# Patient Record
Sex: FEMALE | Race: WHITE | Employment: OTHER | URBAN - METROPOLITAN AREA
[De-identification: names, ages, dates, MRNs, and addresses within clinical notes are randomized per-mention and may not be internally consistent; named-entity substitution may affect disease eponyms.]

---

## 2017-04-13 ENCOUNTER — OP HISTORICAL/CONVERTED ENCOUNTER (OUTPATIENT)
Dept: OTHER | Age: 67
End: 2017-04-13

## 2017-08-27 ENCOUNTER — ED HISTORICAL/CONVERTED ENCOUNTER (OUTPATIENT)
Dept: OTHER | Age: 67
End: 2017-08-27

## 2017-09-18 ENCOUNTER — OP HISTORICAL/CONVERTED ENCOUNTER (OUTPATIENT)
Dept: OTHER | Age: 67
End: 2017-09-18

## 2017-10-02 ENCOUNTER — OP HISTORICAL/CONVERTED ENCOUNTER (OUTPATIENT)
Dept: OTHER | Age: 67
End: 2017-10-02

## 2018-02-28 ENCOUNTER — OP HISTORICAL/CONVERTED ENCOUNTER (OUTPATIENT)
Dept: OTHER | Age: 68
End: 2018-02-28

## 2018-03-01 ENCOUNTER — OP HISTORICAL/CONVERTED ENCOUNTER (OUTPATIENT)
Dept: OTHER | Age: 68
End: 2018-03-01

## 2018-10-31 LAB — HBA1C MFR BLD HPLC: 5.5 %

## 2019-04-16 ENCOUNTER — OP HISTORICAL/CONVERTED ENCOUNTER (OUTPATIENT)
Dept: OTHER | Age: 69
End: 2019-04-16

## 2019-06-05 ENCOUNTER — OP HISTORICAL/CONVERTED ENCOUNTER (OUTPATIENT)
Dept: OTHER | Age: 69
End: 2019-06-05

## 2019-06-26 ENCOUNTER — OP HISTORICAL/CONVERTED ENCOUNTER (OUTPATIENT)
Dept: OTHER | Age: 69
End: 2019-06-26

## 2019-06-26 LAB — COLONOSCOPY, EXTERNAL: NORMAL

## 2020-01-13 LAB
CREATININE, EXTERNAL: 0.72
LDL-C, EXTERNAL: 124
MICROALBUMIN UR TEST STR-MCNC: NORMAL MG/DL

## 2020-07-16 VITALS
TEMPERATURE: 97.4 F | HEART RATE: 70 BPM | BODY MASS INDEX: 33.56 KG/M2 | SYSTOLIC BLOOD PRESSURE: 130 MMHG | HEIGHT: 63 IN | RESPIRATION RATE: 18 BRPM | DIASTOLIC BLOOD PRESSURE: 82 MMHG | OXYGEN SATURATION: 98 % | WEIGHT: 189.4 LBS

## 2020-07-16 PROBLEM — E78.5 DYSLIPIDEMIA: Status: ACTIVE | Noted: 2020-07-16

## 2020-07-16 PROBLEM — E66.9 OBESITY, UNSPECIFIED: Status: ACTIVE | Noted: 2020-07-16

## 2020-07-16 PROBLEM — M79.7 FIBROMYALGIA: Status: ACTIVE | Noted: 2020-07-16

## 2020-07-16 PROBLEM — F41.9 ANXIETY: Status: ACTIVE | Noted: 2020-07-16

## 2020-07-16 PROBLEM — E78.00 PURE HYPERCHOLESTEROLEMIA: Status: ACTIVE | Noted: 2020-07-16

## 2020-07-16 PROBLEM — R06.00 DYSPNEA: Status: ACTIVE | Noted: 2020-07-16

## 2020-07-16 PROBLEM — E03.9 ACQUIRED HYPOTHYROIDISM: Status: ACTIVE | Noted: 2020-07-16

## 2020-07-16 PROBLEM — R06.2 WHEEZING: Status: ACTIVE | Noted: 2020-07-16

## 2020-07-16 PROBLEM — G47.00 INSOMNIA: Status: ACTIVE | Noted: 2020-07-16

## 2020-07-16 PROBLEM — R94.31 NONSPECIFIC ABNORMAL ELECTROCARDIOGRAM (ECG) (EKG): Status: ACTIVE | Noted: 2020-07-16

## 2020-07-16 PROBLEM — K21.9 GASTROESOPHAGEAL REFLUX DISEASE: Status: ACTIVE | Noted: 2020-07-16

## 2020-07-16 PROBLEM — L03.213 PRESEPTAL CELLULITIS: Status: ACTIVE | Noted: 2020-07-16

## 2020-07-16 PROBLEM — F17.210 CIGARETTE SMOKER: Status: ACTIVE | Noted: 2020-07-16

## 2020-07-16 PROBLEM — R05.9 COUGH: Status: ACTIVE | Noted: 2020-07-16

## 2020-07-16 PROBLEM — F17.200 NICOTINE DEPENDENCE: Status: ACTIVE | Noted: 2020-07-16

## 2020-07-16 PROBLEM — L28.2 PRURITIC RASH: Status: ACTIVE | Noted: 2020-07-16

## 2020-07-16 PROBLEM — I10 ESSENTIAL HYPERTENSION, MALIGNANT: Status: ACTIVE | Noted: 2020-07-16

## 2020-07-16 PROBLEM — R22.1 NECK SWELLING: Status: ACTIVE | Noted: 2020-07-16

## 2020-07-16 PROBLEM — J44.9 CHRONIC OBSTRUCTIVE LUNG DISEASE (HCC): Status: ACTIVE | Noted: 2020-07-16

## 2020-07-16 RX ORDER — ALBUTEROL SULFATE 90 UG/1
AEROSOL, METERED RESPIRATORY (INHALATION)
COMMUNITY
End: 2021-03-26 | Stop reason: SDUPTHER

## 2020-07-16 RX ORDER — ALBUTEROL SULFATE 2.5 MG/.5ML
SOLUTION RESPIRATORY (INHALATION) ONCE
COMMUNITY
End: 2020-12-10 | Stop reason: SDUPTHER

## 2020-07-16 RX ORDER — TRAZODONE HYDROCHLORIDE 50 MG/1
TABLET ORAL
COMMUNITY
End: 2020-07-28

## 2020-07-16 RX ORDER — PREGABALIN 150 MG/1
CAPSULE ORAL 2 TIMES DAILY
COMMUNITY

## 2020-07-16 RX ORDER — TRIAMTERENE AND HYDROCHLOROTHIAZIDE 37.5; 25 MG/1; MG/1
CAPSULE ORAL DAILY
COMMUNITY
End: 2020-07-28

## 2020-07-16 RX ORDER — LEVOTHYROXINE SODIUM 50 UG/1
TABLET ORAL
COMMUNITY
End: 2020-07-28

## 2020-07-16 RX ORDER — HYDROXYZINE 25 MG/1
TABLET, FILM COATED ORAL
COMMUNITY
End: 2020-07-27 | Stop reason: SDUPTHER

## 2020-07-16 RX ORDER — OMEPRAZOLE 20 MG/1
20 CAPSULE, DELAYED RELEASE ORAL DAILY
COMMUNITY
End: 2020-07-28

## 2020-07-16 RX ORDER — FLUTICASONE PROPIONATE AND SALMETEROL 100; 50 UG/1; UG/1
1 POWDER RESPIRATORY (INHALATION) EVERY 12 HOURS
COMMUNITY
End: 2020-08-19

## 2020-07-23 VITALS — HEIGHT: 63 IN

## 2020-07-23 RX ORDER — VALACYCLOVIR HYDROCHLORIDE 500 MG/1
TABLET, FILM COATED ORAL 2 TIMES DAILY
COMMUNITY
End: 2020-12-10

## 2020-07-23 RX ORDER — CETIRIZINE HCL 10 MG
TABLET ORAL
COMMUNITY
End: 2021-03-26 | Stop reason: ALTCHOICE

## 2020-07-23 RX ORDER — METRONIDAZOLE 500 MG/1
TABLET ORAL 3 TIMES DAILY
COMMUNITY
End: 2020-12-10

## 2020-07-23 RX ORDER — DULOXETIN HYDROCHLORIDE 30 MG/1
30 CAPSULE, DELAYED RELEASE ORAL DAILY
COMMUNITY
End: 2021-03-26 | Stop reason: ALTCHOICE

## 2020-07-23 RX ORDER — GATIFLOXACIN 5 MG/ML
1 SOLUTION/ DROPS OPHTHALMIC 4 TIMES DAILY
COMMUNITY
End: 2021-03-26 | Stop reason: ALTCHOICE

## 2020-07-23 RX ORDER — CITALOPRAM 20 MG/1
TABLET, FILM COATED ORAL DAILY
COMMUNITY
End: 2021-03-26 | Stop reason: ALTCHOICE

## 2020-07-23 RX ORDER — ATROPINE SULFATE 10 MG/ML
1 SOLUTION/ DROPS OPHTHALMIC 3 TIMES DAILY
COMMUNITY
End: 2021-03-26 | Stop reason: ALTCHOICE

## 2020-07-23 RX ORDER — FLUTICASONE PROPIONATE 50 MCG
2 SPRAY, SUSPENSION (ML) NASAL DAILY
COMMUNITY
End: 2021-03-26 | Stop reason: ALTCHOICE

## 2020-07-23 RX ORDER — PREDNISONE 50 MG/1
50 TABLET ORAL DAILY
COMMUNITY
End: 2020-12-10

## 2020-07-23 RX ORDER — GABAPENTIN 300 MG/1
300 CAPSULE ORAL 3 TIMES DAILY
COMMUNITY
End: 2021-03-26 | Stop reason: ALTCHOICE

## 2020-07-23 RX ORDER — PREDNISOLONE ACETATE 10 MG/ML
1 SUSPENSION/ DROPS OPHTHALMIC 4 TIMES DAILY
COMMUNITY
End: 2021-03-26 | Stop reason: ALTCHOICE

## 2020-07-23 RX ORDER — CHOLESTYRAMINE LIGHT 4 G/5.7G
POWDER, FOR SUSPENSION ORAL
COMMUNITY
End: 2020-12-10

## 2020-07-23 RX ORDER — ASPIRIN 81 MG/1
TABLET ORAL DAILY
COMMUNITY
End: 2021-03-26 | Stop reason: ALTCHOICE

## 2020-07-23 RX ORDER — NEPAFENAC 3 MG/ML
SUSPENSION OPHTHALMIC
COMMUNITY
End: 2021-03-26 | Stop reason: ALTCHOICE

## 2020-07-23 RX ORDER — CLONAZEPAM 0.5 MG/1
TABLET ORAL
COMMUNITY
End: 2021-03-26 | Stop reason: ALTCHOICE

## 2020-07-23 RX ORDER — POLYETHYLENE GLYCOL 3350, SODIUM SULFATE ANHYDROUS, SODIUM BICARBONATE, SODIUM CHLORIDE, POTASSIUM CHLORIDE 236; 22.74; 6.74; 5.86; 2.97 G/4L; G/4L; G/4L; G/4L; G/4L
POWDER, FOR SOLUTION ORAL
COMMUNITY
End: 2021-03-26 | Stop reason: ALTCHOICE

## 2020-07-23 RX ORDER — ZOLPIDEM TARTRATE 5 MG/1
TABLET ORAL
COMMUNITY
End: 2021-03-26 | Stop reason: ALTCHOICE

## 2020-07-23 RX ORDER — DOXYCYCLINE 100 MG/1
100 CAPSULE ORAL 2 TIMES DAILY
COMMUNITY
End: 2020-12-10

## 2020-07-23 RX ORDER — BACITRACIN 500 [USP'U]/G
OINTMENT OPHTHALMIC EVERY 4 HOURS
COMMUNITY
End: 2021-03-26 | Stop reason: ALTCHOICE

## 2020-07-23 RX ORDER — HOMATROPINE HYDROBROMIDE OPHTHALMIC 50 MG/ML
1 SOLUTION OPHTHALMIC
COMMUNITY
End: 2021-03-26 | Stop reason: ALTCHOICE

## 2020-07-23 RX ORDER — METHYLCELLULOSE
POWDER (GRAM) ORAL
COMMUNITY
End: 2021-03-26 | Stop reason: ALTCHOICE

## 2020-07-23 RX ORDER — MONTELUKAST SODIUM 10 MG/1
10 TABLET ORAL DAILY
COMMUNITY
End: 2021-03-26 | Stop reason: ALTCHOICE

## 2020-07-23 RX ORDER — NAPROXEN 500 MG/1
500 TABLET ORAL 2 TIMES DAILY WITH MEALS
COMMUNITY
End: 2020-12-10

## 2020-07-23 RX ORDER — TRIAMCINOLONE ACETONIDE 1 MG/G
CREAM TOPICAL 2 TIMES DAILY
COMMUNITY
End: 2021-03-26 | Stop reason: ALTCHOICE

## 2020-07-23 RX ORDER — DIPHENOXYLATE HYDROCHLORIDE AND ATROPINE SULFATE 2.5; .025 MG/1; MG/1
TABLET ORAL
COMMUNITY
End: 2021-03-26 | Stop reason: ALTCHOICE

## 2020-07-23 RX ORDER — VARENICLINE TARTRATE 0.5 (11)-1
KIT ORAL
COMMUNITY
End: 2020-12-10

## 2020-07-23 RX ORDER — CIPROFLOXACIN 500 MG/1
TABLET ORAL 2 TIMES DAILY
COMMUNITY
End: 2020-12-10

## 2020-07-23 RX ORDER — ALBUTEROL SULFATE 90 UG/1
AEROSOL, METERED RESPIRATORY (INHALATION)
COMMUNITY
End: 2021-03-08

## 2020-07-27 ENCOUNTER — TELEPHONE (OUTPATIENT)
Dept: ENT CLINIC | Age: 70
End: 2020-07-27

## 2020-07-27 DIAGNOSIS — L50.9 URTICARIA: Primary | ICD-10-CM

## 2020-07-27 RX ORDER — HYDROXYZINE 25 MG/1
25 TABLET, FILM COATED ORAL
Qty: 90 TAB | Refills: 3 | Status: SHIPPED | OUTPATIENT
Start: 2020-07-27 | End: 2020-10-26 | Stop reason: SDUPTHER

## 2020-07-28 RX ORDER — TRAZODONE HYDROCHLORIDE 50 MG/1
TABLET ORAL
Qty: 90 TAB | Refills: 3 | Status: SHIPPED | OUTPATIENT
Start: 2020-07-28 | End: 2021-03-26 | Stop reason: SDUPTHER

## 2020-07-28 RX ORDER — OMEPRAZOLE 20 MG/1
CAPSULE, DELAYED RELEASE ORAL
Qty: 90 CAP | Refills: 3 | Status: SHIPPED | OUTPATIENT
Start: 2020-07-28 | End: 2021-03-26 | Stop reason: ALTCHOICE

## 2020-07-28 RX ORDER — LEVOTHYROXINE SODIUM 50 UG/1
TABLET ORAL
Qty: 90 TAB | Refills: 3 | Status: SHIPPED | OUTPATIENT
Start: 2020-07-28 | End: 2021-07-25

## 2020-07-28 RX ORDER — TRIAMTERENE AND HYDROCHLOROTHIAZIDE 37.5; 25 MG/1; MG/1
CAPSULE ORAL
Qty: 90 CAP | Refills: 3 | Status: SHIPPED | OUTPATIENT
Start: 2020-07-28 | End: 2021-03-26 | Stop reason: SDUPTHER

## 2020-08-19 RX ORDER — CEPHALEXIN 250 MG/1
CAPSULE ORAL
Qty: 180 EACH | Refills: 3 | Status: SHIPPED | OUTPATIENT
Start: 2020-08-19 | End: 2020-12-10

## 2020-10-22 ENCOUNTER — TELEPHONE (OUTPATIENT)
Dept: ENT CLINIC | Age: 70
End: 2020-10-22

## 2020-10-26 RX ORDER — HYDROXYZINE 25 MG/1
25 TABLET, FILM COATED ORAL
Qty: 90 TAB | Refills: 3 | Status: SHIPPED | OUTPATIENT
Start: 2020-10-26 | End: 2021-03-26 | Stop reason: SDUPTHER

## 2020-12-10 ENCOUNTER — OFFICE VISIT (OUTPATIENT)
Dept: INTERNAL MEDICINE CLINIC | Age: 70
End: 2020-12-10
Payer: MEDICARE

## 2020-12-10 ENCOUNTER — HOSPITAL ENCOUNTER (OUTPATIENT)
Dept: GENERAL RADIOLOGY | Age: 70
Discharge: HOME | End: 2020-12-10
Payer: MEDICARE

## 2020-12-10 VITALS
BODY MASS INDEX: 34.73 KG/M2 | WEIGHT: 196 LBS | RESPIRATION RATE: 18 BRPM | SYSTOLIC BLOOD PRESSURE: 130 MMHG | HEIGHT: 63 IN | OXYGEN SATURATION: 98 % | DIASTOLIC BLOOD PRESSURE: 84 MMHG | HEART RATE: 72 BPM

## 2020-12-10 DIAGNOSIS — F41.9 ANXIETY: ICD-10-CM

## 2020-12-10 DIAGNOSIS — R07.89 CHEST WALL PAIN: ICD-10-CM

## 2020-12-10 DIAGNOSIS — F17.219 CIGARETTE NICOTINE DEPENDENCE WITH NICOTINE-INDUCED DISORDER: ICD-10-CM

## 2020-12-10 DIAGNOSIS — J44.9 CHRONIC OBSTRUCTIVE PULMONARY DISEASE, UNSPECIFIED COPD TYPE (HCC): ICD-10-CM

## 2020-12-10 DIAGNOSIS — Z91.81 HISTORY OF FALL: ICD-10-CM

## 2020-12-10 DIAGNOSIS — E03.9 ACQUIRED HYPOTHYROIDISM: ICD-10-CM

## 2020-12-10 DIAGNOSIS — M79.7 FIBROMYALGIA: ICD-10-CM

## 2020-12-10 PROCEDURE — 99214 OFFICE O/P EST MOD 30 MIN: CPT | Performed by: INTERNAL MEDICINE

## 2020-12-10 PROCEDURE — 71111 X-RAY EXAM RIBS/CHEST4/> VWS: CPT

## 2020-12-10 RX ORDER — PREDNISONE 5 MG/1
5 TABLET ORAL 2 TIMES DAILY
Qty: 14 TAB | Refills: 0 | Status: SHIPPED | OUTPATIENT
Start: 2020-12-10 | End: 2020-12-11 | Stop reason: SDUPTHER

## 2020-12-10 RX ORDER — ALBUTEROL SULFATE 2.5 MG/.5ML
2.5 SOLUTION RESPIRATORY (INHALATION)
Qty: 45 ML | Refills: 3 | Status: SHIPPED | OUTPATIENT
Start: 2020-12-10 | End: 2021-03-11 | Stop reason: SDUPTHER

## 2020-12-10 RX ORDER — PREGABALIN 150 MG/1
150 CAPSULE ORAL 2 TIMES DAILY
COMMUNITY
End: 2021-03-26 | Stop reason: SDUPTHER

## 2020-12-10 RX ORDER — TRIAMCINOLONE ACETONIDE 1 MG/G
CREAM TOPICAL 2 TIMES DAILY
Qty: 45 G | Refills: 3 | Status: SHIPPED | OUTPATIENT
Start: 2020-12-10 | End: 2021-03-26 | Stop reason: ALTCHOICE

## 2020-12-10 NOTE — PROGRESS NOTES
Ramon Betancourt is a 72 y.o. female and presents with Fall (had fall 2 weeks ago and chest still hurts )  She needs refills for her nebulizer she wants to send to [facility], she also wants to have cream, to apply when she has a rash she does not recall the name, according to her she hurts her chest when she takes deep breathing she had fall, forward 3 weeks ago, and hit her upper chest wall, and, left lower leg she did not go to ER, she tripped, she is known to have COPD with anxiety and fibromyalgia she did not bring her medicines but she takes Lyrica, prescribed by rheumatologist, she is taking triamterene hydrochlorothiazide 37.5/25 mg once a day for control of blood pressure she did not bring any of her medicines I reviewed Mayelin Elizondo and tried to figure out, but she could not tell me her medicine, it was difficult for me and my nurse to do medicine reconciliation, as she does not recall all medications she does not take any antibiotic currently no cough. She told me she cannot take, naproxen or, meloxicam causing stomach side effects also she is allergic to codeine so she cannot be given tramadol clinically there is  mild localized tenderness by  deep palpation on her right upper rib cage. X-ray of the ribs with chest ordered and I reviewed and she has no fracture and her lungs are clear. She has no fever or chills. Review of Systems    Review of Systems   Constitutional: Negative. HENT: Negative for sore throat and tinnitus. Eyes: Negative for blurred vision. Cardiovascular:        Anterior chest wall pain after having fall  approximately 3 weeks ago according to her she tripped and  fell forward and now she has slight pain, while taking deep breath. She has not taken anything to relieve the pain,. No fever or chills. No cough with sputum.,   Gastrointestinal: Negative. Genitourinary: Negative. Musculoskeletal: Positive for falls.  Negative for back pain, joint pain, myalgias and neck pain. Anterior chest wall pain especially right upper side. Recently she tripped 3 weeks ago. go to any clinics or, ER.,  She is known case of fibromyalgia. Taking Lyrica   Skin: Negative for itching and rash. Neurological: Negative for dizziness, tingling, sensory change, speech change, focal weakness, weakness and headaches. Endo/Heme/Allergies: Negative for polydipsia. Psychiatric/Behavioral: Negative for depression and memory loss. The patient does not have insomnia.          Past Medical History:   Diagnosis Date    Acquired hypothyroidism 7/16/2020    Anxiety 7/16/2020    Chronic kidney disease     Chronic obstructive lung disease ([facility] [address] 7/16/2020    Cigarette smoker 7/16/2020    Cough 7/16/2020    Depression     Dyslipidemia 7/16/2020    Dyspnea 7/16/2020    Essential hypertension, malignant 7/16/2020    Fibromyalgia 7/16/2020    Gastroesophageal reflux disease 7/16/2020    GERD (gastroesophageal reflux disease)     Insomnia 7/16/2020    Neck swelling 7/16/2020    Nicotine dependence 7/16/2020    Nonspecific abnormal electrocardiogram (ECG) (EKG) 7/16/2020    Obesity, unspecified 7/16/2020    Preseptal cellulitis 7/16/2020    Pruritic rash 7/16/2020    Pure hypercholesterolemia 7/16/2020    Sleep disorder     Wheezing 7/16/2020     Past Surgical History:   Procedure Laterality Date    HX GYN  12/12/1994    HX TONSILLECTOMY  09/12/2002     Social History     Socioeconomic History    Marital status:      Spouse name: Not on file    Number of children: Not on file    Years of education: Not on file    Highest education level: Not on file   Tobacco Use    Smoking status: Current Every Day Smoker     Packs/day: 0.50     Years: 23.00     Pack years: 11.50    Smokeless tobacco: Never Used   Substance and Sexual Activity    Alcohol use: Yes     Comment: 30 years    Drug use: Never     Family History   Problem Relation Age of Onset    Hypertension Father    Neeta Lo Cancer Mother         lung     Current Outpatient Medications   Medication Sig Dispense Refill    predniSONE (DELTASONE) 5 mg tablet Take 1 Tab by mouth two (2) times a day. 14 Tab 0    albuterol sulfate (PROVENTIL;VENTOLIN) 2.5 mg/0.5 mL nebu nebulizer solution 0.5 mL by Nebulization route every four (4) hours as needed for Wheezing for up to 90 days. 45 mL 3    pregabalin (LYRICA) 150 mg capsule Take 150 mg by mouth two (2) times a day.  triamcinolone acetonide (KENALOG) 0.1 % topical cream Apply  to affected area two (2) times a day. use thin layer 45 g 3    hydrOXYzine HCL (ATARAX) 25 mg tablet Take 1 Tab by mouth three (3) times daily as needed for Itching for up to 90 doses. 90 Tab 3    levothyroxine (SYNTHROID) 50 mcg tablet TAKE 1 TABLET DAILY IN THE MORNING 90 Tab 3    omeprazole (PRILOSEC) 20 mg capsule TAKE 1 CAPSULE DAILY AS DIRECTED 90 Cap 3    traZODone (DESYREL) 50 mg tablet TAKE 1 TABLET DAILY AS DIRECTED 90 Tab 3    triamterene-hydroCHLOROthiazide (DYAZIDE) 37.5-25 mg per capsule TAKE 1 CAPSULE DAILY IN THE MORNING 90 Cap 3    vit D3-folic [id number] 7,[id number] unit-mcg-mg tab Take  by mouth.  aspirin delayed-release 81 mg tablet Take  by mouth daily.  methylcellulose, laxative, (Citrucel Sugar Free) powd Take  by mouth.  fluticasone propionate (FLONASE) 50 mcg/actuation nasal spray 2 Sprays by Both Nostrils route daily.  montelukast (SINGULAIR) 10 mg tablet Take 10 mg by mouth daily.  valACYclovir (VALTREX) 500 mg tablet Take  by mouth two (2) times a day.  cetirizine (ZYRTEC) 10 mg tablet Take  by mouth.  zolpidem (AMBIEN) 5 mg tablet Take  by mouth nightly as needed for Sleep.  pregabalin (LYRICA) 150 mg capsule Take  by mouth.  albuterol (ProAir HFA) 90 mcg/actuation inhaler Take  by inhalation.  albuterol (ProAir HFA) 90 mcg/actuation inhaler Take  by inhalation.       triamcinolone acetonide (KENALOG) 0.1 % topical cream Apply  to affected area two (2) times a day. use thin layer      BUPROPION HCL PO Take  by mouth.  varenicline (Chantix Starting Month Box) 0.5 mg (11)- 1 mg (42) DsPk Take  by mouth.  atropine 1 % ophthalmic solution 1 Drop three (3) times daily.  bacitracin ophthalmic ointment Administer  to both eyes every four (4) hours.  Cholestyramine-Aspartame (Cholestyramine Light) 4 gram powder Take  by mouth three (3) times daily (with meals).  ciprofloxacin HCl (Cipro) 500 mg tablet Take  by mouth two (2) times a day.  gabapentin (NEURONTIN) 300 mg capsule Take 300 mg by mouth three (3) times daily.  gatifloxacin (ZYMAXID) 0.5 % drop ophthalmic solution 1 Drop four (4) times daily.  homatropine (ISOPTO-HOMATROPINE) 5 % ophthalmic solution Administer 1 Drop to both eyes now.  nepafenac (Ilevro) 0.3 % drps Apply  to eye.  metroNIDAZOLE (FLAGYL) 500 mg tablet Take  by mouth three (3) times daily.  naproxen (NAPROSYN) 500 mg tablet Take 500 mg by mouth two (2) times daily (with meals).  prednisoLONE acetate (PRED FORTE) 1 % ophthalmic suspension Administer 1 Drop to both eyes four (4) times daily.  predniSONE (DELTASONE) 50 mg tablet Take 50 mg by mouth daily.  citalopram (CELEXA) 20 mg tablet Take  by mouth daily.  clonazePAM (KlonoPIN) 0.5 mg tablet Take  by mouth nightly as needed for Anxiety.  DICLOFENAC SODIUM PO Take  by mouth.  diphenoxylate-atropine (LOMOTIL) 2.5-0.025 mg per tablet Take  by mouth four (4) times daily as needed for Diarrhea.  doxycycline (VIBRAMYCIN) 100 mg capsule Take 100 mg by mouth two (2) times a day.  DULoxetine (CYMBALTA) 30 mg capsule Take 30 mg by mouth daily.  PEG 3350-Electrolytes (GaviLyte-G) 236-22.74-6.74 -5.86 gram suspension Take  by mouth now.        Allergies   Allergen Reactions    Nicotine Itching    Codeine Rash       Objective:  Visit Vitals  /84 (BP 1 Location: Left arm, BP Patient Position: Sitting)   Pulse 72   Resp 18   Ht 5' 3\" (1.6 m)   Wt 196 lb (88.9 kg)   SpO2 98%   BMI 34.72 kg/m²       Physical Exam:   Constitutional: General Appearance: Pleasant. Level of Distress: NAD. Psychiatric: Mental Status: normal mood and affect Orientation: to time, place, and person. ,normal eye contact. Head: Head: normocephalic and atraumatic. Eyes: Pupils: PERRLA. Sclerae: non-icteric. Neck: Neck: supple, trachea midline, and no masses. Lymph Nodes: no cervical LAD. Thyroid: no enlargement or nodules and non-tender. Lungs: Respiratory effort: no dyspnea. Auscultation: no wheezing, rales/crackles, or rhonchi and breath sounds normal and good air movement. Cardiovascular: Apical Impulse: not displaced. Heart Auscultation: normal S1 and S2; no murmurs, rubs, or gallops; and RRR. Neck vessels: no carotid bruits. Pulses including femoral / pedal: normal throughout. Abdomen: Bowel Sounds: normal. Inspection and Palpation: no tenderness, guarding, or masses and soft and non-distended. Liver: non-tender and no hepatomegaly. Spleen: non-tender and no splenomegaly. Musculoskeletal[de-identified] Extremities: no edema,no varicosities. No Calf tenderness. Localized tenderness on deep palpation around right middle and right upper  Ribs, and chest wall no bruise, no redness no swelling,,  Neurologic: Gait and Station: normal gait and station. Motor Strength normal right and left. Skin: Inspection and palpation: no rash, lesions, or ulcer. Results for orders placed or performed in visit on 07/23/20   AMB EXT LDL-C   Result Value Ref Range    LDL-C, External 124    AMB EXT URINE MICROALBUMIN   Result Value Ref Range    Urine Microalbumin, External Neg. AMB EXT HGBA1C   Result Value Ref Range    Hemoglobin A1c, External 5.5    HM COLONOSCOPY   Result Value Ref Range    Colonoscopy, External WDL. F/u 1 yr. Assessment/Plan:      ICD-10-CM ICD-9-CM    1.  Chest wall pain  R07.89 786.52 XR RIBS BI W PA CHEST 4 VS   2. History of fall  Z91.81 V15.88 XR RIBS BI W PA CHEST 4 VS   3. Fibromyalgia  M79.7 729.1    4. Chronic obstructive pulmonary disease, unspecified COPD type ([facility] [address]  J44.9 496    5. Anxiety  F41.9 300.00    6. Acquired hypothyroidism  E03.9 244.9    7. Cigarette nicotine dependence with nicotine-induced disorder  F17.219 292.9      Orders Placed This Encounter    XR RIBS BI W PA CHEST 4 VS     Standing Status:   Future     Number of Occurrences:   1     Standing Expiration Date:   1/10/2022     Scheduling Instructions:      H/o fall with chest hurting while breathing ,r/o rib fracture,3 weeks ago    predniSONE (DELTASONE) 5 mg tablet     Sig: Take 1 Tab by mouth two (2) times a day. Dispense:  14 Tab     Refill:  0    albuterol sulfate (PROVENTIL;VENTOLIN) 2.5 mg/0.5 mL nebu nebulizer solution     Si.5 mL by Nebulization route every four (4) hours as needed for Wheezing for up to 90 days. Dispense:  45 mL     Refill:  3    pregabalin (LYRICA) 150 mg capsule     Sig: Take 150 mg by mouth two (2) times a day.  triamcinolone acetonide (KENALOG) 0.1 % topical cream     Sig: Apply  to affected area two (2) times a day. use thin layer     Dispense:  45 g     Refill:  3     History of fall 3 weeks ago she fell forward, and hit her, upper chest wall and, her face, and she had no loss of consciousness she just missed the barrier, she had, superficial bruise she came with the pain in her, chest wall with deep breathing, no cough, no fever, no exposure to COVID-19, no  shortness of breath. She is a known case of COPD. She takes rescue inhaler and nebulizer treatment. Clinically looks like localized blunt trauma, to the chest wall, she cannot take NSAIDs causing  GI upset when I asked her to take meloxicam or naproxen or diclofenac. She refused. She cannot take tramadol because of allergy to codeine.   I started her on prednisone 5 mg twice a day for 1 week and explained her to have ice application alternate with heating pad in the back and she should not apply direct cold ice or direct heat to prevent the bone and the injury she did not do anything for the pain,, she did not go to ER or urgent care x-ray of the ribs and chest ordered, it is clear and no fracture of the rib, I told her to give some time, for pain to go away with anti-inflammatory medicine, especially with steroid she cannot take NSAIDs, or tramadol, she has COPD,She wants refills to send to [facility] sometimes she has, rash causing itching she wants to have triamcinolone cream.  She did not bring medicines for reconciliation, she takes Lyrica for her fibromyalgia she also takes hydroxyzine for her anxiety,, her blood pressure is controlled, she takes triamterene/hydrochlorothiazide 37.5/25 mg once a day. She told me she does not take any Advair. Education and counseling given. Follow-up in 3 months. Answered all her questions. .  Pleasant she also takes Synthroid 50 mcg/day and she smokes 3 to 4 cigarettes/day. She takes hydroxyzine for her anxiety. continue present plan, call if any problems    There are no Patient Instructions on file for this visit. Follow-up and Dispositions    · Return in about 3 months (around 3/10/2021) for copd,htn ,xray ribs cxr.

## 2020-12-11 RX ORDER — PREDNISONE 5 MG/1
5 TABLET ORAL 2 TIMES DAILY
Qty: 14 TAB | Refills: 0 | Status: SHIPPED | OUTPATIENT
Start: 2020-12-11 | End: 2021-03-26 | Stop reason: ALTCHOICE

## 2020-12-11 NOTE — TELEPHONE ENCOUNTER
Patient called the medication prednisone 5mg you order yesterday was sent to a mail order pharmacy can you please send to [facility]. Please call with Chest Xray results.

## 2021-03-11 ENCOUNTER — TELEPHONE (OUTPATIENT)
Dept: INTERNAL MEDICINE CLINIC | Age: 71
End: 2021-03-11

## 2021-03-11 RX ORDER — ALBUTEROL SULFATE 2.5 MG/.5ML
2.5 SOLUTION RESPIRATORY (INHALATION)
Qty: 540 ML | Refills: 3 | Status: SHIPPED | OUTPATIENT
Start: 2021-03-11 | End: 2021-03-26 | Stop reason: ALTCHOICE

## 2021-03-11 NOTE — TELEPHONE ENCOUNTER
[facility] faxed refill request for    Albuterol 0.083% (2.5mg/3ML) 60x3ml  Use 3 ml via nebulizer every 4 to 6 hours as needed  Last refill 11/9/2020    LOV 12/10/2020    Canceled 3/10/21  Cancel Rsn: Patient Initiated - Patient is sick/illness (patient called to see if she can do a telephone visit with Dr. Nory Rothman stated she cant do a virtual doesnt work told her I would have to ask Dr. Nory Rothman she stated she does not want to wear a mask so she guess she will just die and hung up)

## 2021-03-12 RX ORDER — ALBUTEROL SULFATE 0.83 MG/ML
2.5 SOLUTION RESPIRATORY (INHALATION)
Qty: 60 NEBULE | Refills: 5 | Status: SHIPPED | OUTPATIENT
Start: 2021-03-12 | End: 2021-03-26 | Stop reason: CLARIF

## 2021-03-26 ENCOUNTER — OFFICE VISIT (OUTPATIENT)
Dept: INTERNAL MEDICINE CLINIC | Age: 71
End: 2021-03-26
Payer: MEDICARE

## 2021-03-26 VITALS
OXYGEN SATURATION: 96 % | DIASTOLIC BLOOD PRESSURE: 88 MMHG | RESPIRATION RATE: 12 BRPM | WEIGHT: 197.2 LBS | HEART RATE: 72 BPM | SYSTOLIC BLOOD PRESSURE: 148 MMHG | BODY MASS INDEX: 34.94 KG/M2 | HEIGHT: 63 IN | TEMPERATURE: 97.8 F

## 2021-03-26 DIAGNOSIS — E55.9 VITAMIN D DEFICIENCY: ICD-10-CM

## 2021-03-26 DIAGNOSIS — E78.5 DYSLIPIDEMIA: ICD-10-CM

## 2021-03-26 DIAGNOSIS — F41.9 ANXIETY: ICD-10-CM

## 2021-03-26 DIAGNOSIS — G47.00 INSOMNIA, UNSPECIFIED TYPE: ICD-10-CM

## 2021-03-26 DIAGNOSIS — E03.9 ACQUIRED HYPOTHYROIDISM: ICD-10-CM

## 2021-03-26 DIAGNOSIS — I10 ESSENTIAL HYPERTENSION: ICD-10-CM

## 2021-03-26 DIAGNOSIS — J44.9 CHRONIC OBSTRUCTIVE PULMONARY DISEASE, UNSPECIFIED COPD TYPE (HCC): ICD-10-CM

## 2021-03-26 DIAGNOSIS — K21.9 GASTROESOPHAGEAL REFLUX DISEASE WITHOUT ESOPHAGITIS: ICD-10-CM

## 2021-03-26 DIAGNOSIS — L84 CALLUS OF FOOT: ICD-10-CM

## 2021-03-26 DIAGNOSIS — F17.218 CIGARETTE NICOTINE DEPENDENCE WITH OTHER NICOTINE-INDUCED DISORDER: ICD-10-CM

## 2021-03-26 DIAGNOSIS — M79.7 FIBROMYALGIA: ICD-10-CM

## 2021-03-26 PROCEDURE — 1100F PTFALLS ASSESS-DOCD GE2>/YR: CPT | Performed by: INTERNAL MEDICINE

## 2021-03-26 PROCEDURE — G8400 PT W/DXA NO RESULTS DOC: HCPCS | Performed by: INTERNAL MEDICINE

## 2021-03-26 PROCEDURE — G8536 NO DOC ELDER MAL SCRN: HCPCS | Performed by: INTERNAL MEDICINE

## 2021-03-26 PROCEDURE — G8427 DOCREV CUR MEDS BY ELIG CLIN: HCPCS | Performed by: INTERNAL MEDICINE

## 2021-03-26 PROCEDURE — G8417 CALC BMI ABV UP PARAM F/U: HCPCS | Performed by: INTERNAL MEDICINE

## 2021-03-26 PROCEDURE — 1090F PRES/ABSN URINE INCON ASSESS: CPT | Performed by: INTERNAL MEDICINE

## 2021-03-26 PROCEDURE — G8753 SYS BP > OR = 140: HCPCS | Performed by: INTERNAL MEDICINE

## 2021-03-26 PROCEDURE — G8510 SCR DEP NEG, NO PLAN REQD: HCPCS | Performed by: INTERNAL MEDICINE

## 2021-03-26 PROCEDURE — 3017F COLORECTAL CA SCREEN DOC REV: CPT | Performed by: INTERNAL MEDICINE

## 2021-03-26 PROCEDURE — 99214 OFFICE O/P EST MOD 30 MIN: CPT | Performed by: INTERNAL MEDICINE

## 2021-03-26 PROCEDURE — 3288F FALL RISK ASSESSMENT DOCD: CPT | Performed by: INTERNAL MEDICINE

## 2021-03-26 PROCEDURE — G8754 DIAS BP LESS 90: HCPCS | Performed by: INTERNAL MEDICINE

## 2021-03-26 RX ORDER — HYDROXYZINE 25 MG/1
25 TABLET, FILM COATED ORAL
Qty: 180 TAB | Refills: 1 | Status: SHIPPED | OUTPATIENT
Start: 2021-03-26 | End: 2021-09-14

## 2021-03-26 RX ORDER — OMEPRAZOLE 40 MG/1
40 CAPSULE, DELAYED RELEASE ORAL DAILY
Qty: 90 CAP | Refills: 1 | Status: SHIPPED | OUTPATIENT
Start: 2021-03-26 | End: 2021-09-04

## 2021-03-26 RX ORDER — TRIAMTERENE AND HYDROCHLOROTHIAZIDE 37.5; 25 MG/1; MG/1
1 CAPSULE ORAL EVERY MORNING
Qty: 90 CAP | Refills: 1 | Status: SHIPPED | OUTPATIENT
Start: 2021-03-26 | End: 2021-09-28

## 2021-03-26 RX ORDER — TRAZODONE HYDROCHLORIDE 50 MG/1
50 TABLET ORAL
Qty: 90 TAB | Refills: 3 | Status: SHIPPED | OUTPATIENT
Start: 2021-03-26 | End: 2022-03-28

## 2021-03-26 RX ORDER — ALBUTEROL SULFATE 90 UG/1
2 AEROSOL, METERED RESPIRATORY (INHALATION)
Qty: 3 INHALER | Refills: 3 | Status: SHIPPED | OUTPATIENT
Start: 2021-03-26 | End: 2022-07-06 | Stop reason: SDUPTHER

## 2021-03-26 RX ORDER — TRIAMTERENE AND HYDROCHLOROTHIAZIDE 37.5; 25 MG/1; MG/1
1 CAPSULE ORAL EVERY MORNING
Qty: 90 CAP | Refills: 1 | Status: SHIPPED | OUTPATIENT
Start: 2021-03-26 | End: 2021-03-26 | Stop reason: SDUPTHER

## 2021-03-26 RX ORDER — ALBUTEROL SULFATE 90 UG/1
1 AEROSOL, METERED RESPIRATORY (INHALATION)
Qty: 3 INHALER | Refills: 3 | Status: SHIPPED | OUTPATIENT
Start: 2021-03-26 | End: 2021-05-07 | Stop reason: SDUPTHER

## 2021-03-26 RX ORDER — ALBUTEROL SULFATE 0.83 MG/ML
2.5 SOLUTION RESPIRATORY (INHALATION)
Qty: 9080 ML | Refills: 3 | Status: SHIPPED | OUTPATIENT
Start: 2021-03-26 | End: 2021-06-24

## 2021-03-26 NOTE — PROGRESS NOTES
Chief Complaint   Patient presents with    Follow Up Chronic Condition    Hypertension       1. Have you been to the ER, urgent care clinic since your last visit? Hospitalized since your last visit? No    2. Have you seen or consulted any other health care providers outside of the [address] [place] [address] since your last visit? Include any pap smears or colon screening.  No    Visit Vitals  BP (!) 140/76 (BP 1 Location: Left upper arm, BP Patient Position: Sitting, BP Cuff Size: Adult)   Pulse 76   Temp 97.8 °F (36.6 °C) (Oral)   Resp 12   Ht 5' 3\" (1.6 m)   Wt 197 lb 3.2 oz (89.4 kg)   SpO2 96%   BMI 34.93 kg/m²

## 2021-03-26 NOTE — PROGRESS NOTES
Julio Roy is a 72 y.o. female and presents with Follow Up Chronic Condition and Hypertension    Ms. Juve Garcia came for follow-up after a long time. She lives in mobile home but she has good support from her daughter. She is independent for ADL and IADL. She is  having underlying COPD. She needs refills for nebulizer machine. She is smoking less than 1 pack of cigarettes per day. she could not tolerate Chantix due to adverse side effects in the past.  She has hypertension today her blood pressure is slightly elevated but she has not taken her triamterene HCTZ today. She is on the fasting state so she can do her baseline blood work. She has hypothyroidism. She needs refills. She has insomnia taking trazodone. She has anxiety spells and history of fibromyalgia. She gets refill of hydroxyzine from Dr. Marge Fletcher always ENT specialist and I gave refill. She takes omeprazole for her acid reflux. She is given pregabalin 150 mg twice a day by rheumatologist for her fibromyalgia. She has no negative thoughts or depression. She has not received vaccine for COVID-19. Initially she refused to wear the mask before making appointment however she was wearing mask when she came in the room,. Review of Systems    Review of Systems   Constitutional: Negative. HENT: Negative for congestion, ear discharge, sinus pain and sore throat. Eyes: Negative for blurred vision. Respiratory: Negative for cough, sputum production and wheezing. Underlying COPD she gets short of breath with exertion she had cardiac work-up done with cardiologist in the past,   Cardiovascular: Negative for chest pain, palpitations, orthopnea, leg swelling and PND. Gastrointestinal: Negative. Genitourinary: Negative. Musculoskeletal: Negative for back pain, falls, joint pain and myalgias. Having fibromyalgia taking pregabalin. Skin: Negative for rash.    Neurological: Negative for dizziness, sensory change and headaches. Psychiatric/Behavioral: Negative for depression and memory loss. The patient is nervous/anxious and has insomnia. History of anxiety and insomnia. Taking medicines. Past Medical History:   Diagnosis Date    Acquired hypothyroidism 7/16/2020    Anxiety 7/16/2020    Chronic kidney disease     Chronic obstructive lung disease ([facility] [address] 7/16/2020    Cigarette smoker 7/16/2020    Cough 7/16/2020    Depression     Dyslipidemia 7/16/2020    Dyspnea 7/16/2020    Essential hypertension, malignant 7/16/2020    Fibromyalgia 7/16/2020    Gastroesophageal reflux disease 7/16/2020    GERD (gastroesophageal reflux disease)     Insomnia 7/16/2020    Neck swelling 7/16/2020    Nicotine dependence 7/16/2020    Nonspecific abnormal electrocardiogram (ECG) (EKG) 7/16/2020    Obesity, unspecified 7/16/2020    Preseptal cellulitis 7/16/2020    Pruritic rash 7/16/2020    Pure hypercholesterolemia 7/16/2020    Sleep disorder     Wheezing 7/16/2020     Past Surgical History:   Procedure Laterality Date    HX GYN  12/12/1994    HX TONSILLECTOMY  09/12/2002     Social History     Socioeconomic History    Marital status:      Spouse name: Not on file    Number of children: Not on file    Years of education: Not on file    Highest education level: Not on file   Tobacco Use    Smoking status: Current Every Day Smoker     Packs/day: 0.50     Years: 23.00     Pack years: 11.50     Types: Cigarettes    Smokeless tobacco: Never Used   Substance and Sexual Activity    Alcohol use: Yes     Comment: 30 years    Drug use: Never     Family History   Problem Relation Age of Onset    Hypertension Father     Cancer Mother         lung     Current Outpatient Medications   Medication Sig Dispense Refill    omeprazole (PRILOSEC) 40 mg capsule Take 1 Cap by mouth daily. 90 Cap 1    triamterene-hydroCHLOROthiazide (DYAZIDE) 37.5-25 mg per capsule Take 1 Cap by mouth Every morning.  90 Cap 1    hydrOXYzine HCL (ATARAX) 25 mg tablet Take 1 Tab by mouth two (2) times daily as needed for Anxiety or Agitation for up to 90 doses. 180 Tab 1    albuterol (PROVENTIL VENTOLIN) 2.5 mg /3 mL (0.083 %) nebu 3 mL by Nebulization route every six (6) hours as needed for Wheezing for up to 90 days. 9080 mL 3    albuterol (PROVENTIL HFA, VENTOLIN HFA, PROAIR HFA) 90 mcg/actuation inhaler Take 1 Puff by inhalation every six (6) hours as needed for Wheezing. 3 Inhaler 3    albuterol (ProAir HFA) 90 mcg/actuation inhaler Take 2 Puffs by inhalation every six (6) hours as needed for Wheezing. 3 Inhaler 3    traZODone (DESYREL) 50 mg tablet Take 1 Tab by mouth nightly. 90 Tab 3    levothyroxine (SYNTHROID) 50 mcg tablet TAKE 1 TABLET DAILY IN THE MORNING 90 Tab 3    pregabalin (LYRICA) 150 mg capsule Take  by mouth. Allergies   Allergen Reactions    Nicotine Itching    Codeine Rash       Objective:  Visit Vitals  BP (!) 148/88 (BP 1 Location: Right upper arm, BP Patient Position: Sitting, BP Cuff Size: Adult)   Pulse 72   Temp 97.8 °F (36.6 °C) (Oral)   Resp 12   Ht 5' 3\" (1.6 m)   Wt 197 lb 3.2 oz (89.4 kg)   SpO2 96%   BMI 34.93 kg/m²       Physical Exam:   Constitutional: General Appearance: Pleasant . Level of Distress: NAD. Psychiatric: Mental Status: normal mood and affect Orientation: to time, place, and person. ,normal eye contact. Head: Head: normocephalic and atraumatic. Eyes: Pupils: PERRLA. Sclerae: non-icteric. Neck: Neck: supple, trachea midline, and no masses. Lymph Nodes: no cervical LAD. Thyroid: no enlargement or nodules and non-tender. Lungs: Respiratory effort: no dyspnea. Auscultation: no wheezing, rales/crackles, or rhonchi and breath sounds normal and good air movement. Cardiovascular: Apical Impulse: not displaced. Heart Auscultation: normal S1 and S2; no murmurs, rubs, or gallops; and RRR. Neck vessels: no carotid bruits. Pulses including femoral / pedal: normal throughout. Abdomen: Bowel Sounds: normal. Inspection and Palpation: no tenderness, guarding, or masses and soft and non-distended. Liver: non-tender and no hepatomegaly. Spleen: non-tender and no splenomegaly. Musculoskeletal[de-identified] Extremities: no edema,no varicosities. No Calf tenderness. Neurologic: Gait and Station: normal gait and station. Motor Strength normal right and left. Skin: Inspection and palpation:Callus in both feet      Results for orders placed or performed in visit on 07/23/20   AMB EXT LDL-C   Result Value Ref Range    LDL-C, External 124    AMB EXT URINE MICROALBUMIN   Result Value Ref Range    Urine Microalbumin, External Neg. AMB EXT HGBA1C   Result Value Ref Range    Hemoglobin A1c, External 5.5    HM COLONOSCOPY   Result Value Ref Range    Colonoscopy, External WDL. F/u 1 yr. Assessment/Plan:      ICD-10-CM ICD-9-CM    1. Chronic obstructive pulmonary disease, unspecified COPD type ([facility] [address]  J44.9 496    2. Acquired hypothyroidism  E03.9 244.9 TSH 3RD GENERATION      T4, FREE   3. Essential hypertension  I10 401.9 CBC WITH AUTOMATED DIFF      METABOLIC PANEL, COMPREHENSIVE   4. Insomnia, unspecified type  G47.00 780.52    5. Anxiety  F41.9 300.00    6. Fibromyalgia  M79.7 729.1    7. Gastroesophageal reflux disease without esophagitis  K21.9 530.81    8. Dyslipidemia  E78.5 272.4 LIPID PANEL   9. Cigarette nicotine dependence with other nicotine-induced disorder  F17.218 292.89    10. Vitamin D deficiency  E55.9 268.9 VITAMIN D, 25 HYDROXY   11.  Callus of foot  L84 700 REFERRAL TO PODIATRY     Orders Placed This Encounter    CBC WITH AUTOMATED DIFF    METABOLIC PANEL, COMPREHENSIVE    LIPID PANEL    TSH 3RD GENERATION    VITAMIN D, 25 HYDROXY    T4, FREE    REFERRAL TO PODIATRY     Referral Priority:   Routine     Referral Type:   Consultation     Referral Reason:   Specialty Services Required     Referred to Provider:   Colten Morillo DPM     Number of Visits Requested:   1    DISCONTD: triamterene-hydroCHLOROthiazide (DYAZIDE) 37.5-25 mg per capsule     Sig: Take 1 Cap by mouth Every morning. Dispense:  90 Cap     Refill:  1    omeprazole (PRILOSEC) 40 mg capsule     Sig: Take 1 Cap by mouth daily. Dispense:  90 Cap     Refill:  1    triamterene-hydroCHLOROthiazide (DYAZIDE) 37.5-25 mg per capsule     Sig: Take 1 Cap by mouth Every morning. Dispense:  90 Cap     Refill:  1    hydrOXYzine HCL (ATARAX) 25 mg tablet     Sig: Take 1 Tab by mouth two (2) times daily as needed for Anxiety or Agitation for up to 90 doses. Dispense:  180 Tab     Refill:  1    albuterol (PROVENTIL VENTOLIN) 2.5 mg /3 mL (0.083 %) nebu     Sig: 3 mL by Nebulization route every six (6) hours as needed for Wheezing for up to 90 days. Dispense:  9080 mL     Refill:  3    albuterol (PROVENTIL HFA, VENTOLIN HFA, PROAIR HFA) 90 mcg/actuation inhaler     Sig: Take 1 Puff by inhalation every six (6) hours as needed for Wheezing. Dispense:  3 Inhaler     Refill:  3    albuterol (ProAir HFA) 90 mcg/actuation inhaler     Sig: Take 2 Puffs by inhalation every six (6) hours as needed for Wheezing. Dispense:  3 Inhaler     Refill:  3    traZODone (DESYREL) 50 mg tablet     Sig: Take 1 Tab by mouth nightly. Dispense:  90 Tab     Refill:  3       COPD due to smoking cigarette currently still smoking, less than 1 pack/day trying to cut back did not, tolerate taking Chantix in the past.  Continued on rescue inhaler. Continued on nebulizer with albuterol and refill sent to the [facility]. She is not taking generic brand of Symbicort. I am not sure why she is not taking any maintenance inhaler. Hypertension blood pressure is elevated because she has not taken her antihypertensive continued on triamterene hydrochlorothiazide 37.5/25 mg/day. I will not make changes in her medicines.     Insomnia with anxiety she takes trazodone 50 mg at bedtime refills given and she takes hydroxyzine 25 mg twice a day as needed. She was getting refill of, hydroxyzine from ENT specialist Dr. Chioma Walker I gave her refill so that she does not have to call her ENT specialist.  She does not take any maintenance SSRI. She told me she is not having depression. She has not received COVID-19 vaccine. Education given. Educated to wear the facemask in the close space and to maintain the safe distance also frequent handwashing. Hypothyroidism continued on levothyroxine 50 mcg once a day. Fibromyalgia follows rheumatologist  Dr. Alena Forte getting pregabalin 150 mg twice a day. She should do graded exercise. I do not think she is doing any exercise. She had cardiac work-up done in the past with cardiologist and cardiac work-up was unremarkable. GERD taking omeprazole. Refills given. History of dyslipidemia. Labs ordered. Diet low in fat. Vitamin D level ordered. She has callus in both feet and I referred her to podiatrist that she needs callus removal and foot care. She should  change her footwear to prevent the pressure and callus formation and she should wear wide footwear. I spent at least 5 minutes in smoking cessation counseling . Complications from smoking explained including, but not limited to CANCER of mouth, tongue, throat (Larynx), LUNG, esophagus, stomach, bladder among others, COPD, emphysema, asthma, damage to blood vessels which can affect circulation to eyes, kidneys, fingers, toes AND blood flow to vital organs,  causing  heart disease, stroke and other complications. Also increased risk of blood pressure, palpitations, chronic sinus problem, decreased taste and smell. Discussed available methods that help with quitting, as well as evidence available for each method to quit smoking. Follow-up in 2 months. Refills given. Answered all her questions. continue present plan, routine labs ordered, call if any problems    There are no Patient Instructions on file for this visit. Follow-up and Dispositions    · Return in about 2 months (around 5/26/2021) for follow up for chronic condition,ref podiatry ,fas lab.

## 2021-03-27 LAB
25(OH)D3+25(OH)D2 SERPL-MCNC: 37 NG/ML (ref 30–100)
ALBUMIN SERPL-MCNC: 4.3 G/DL (ref 3.8–4.8)
ALBUMIN/GLOB SERPL: 1.7 {RATIO} (ref 1.2–2.2)
ALP SERPL-CCNC: 84 IU/L (ref 39–117)
ALT SERPL-CCNC: 15 IU/L (ref 0–32)
AST SERPL-CCNC: 11 IU/L (ref 0–40)
BASOPHILS # BLD AUTO: 0.1 X10E3/UL (ref 0–0.2)
BASOPHILS NFR BLD AUTO: 1 %
BILIRUB SERPL-MCNC: 0.4 MG/DL (ref 0–1.2)
BUN SERPL-MCNC: 9 MG/DL (ref 8–27)
BUN/CREAT SERPL: 14 (ref 12–28)
CALCIUM SERPL-MCNC: 9.2 MG/DL (ref 8.7–10.3)
CHLORIDE SERPL-SCNC: 100 MMOL/L (ref 96–106)
CHOLEST SERPL-MCNC: 195 MG/DL (ref 100–199)
CO2 SERPL-SCNC: 24 MMOL/L (ref 20–29)
CREAT SERPL-MCNC: 0.65 MG/DL (ref 0.57–1)
EOSINOPHIL # BLD AUTO: 0.4 X10E3/UL (ref 0–0.4)
EOSINOPHIL NFR BLD AUTO: 5 %
ERYTHROCYTE [DISTWIDTH] IN BLOOD BY AUTOMATED COUNT: 12.6 % (ref 11.7–15.4)
GLOBULIN SER CALC-MCNC: 2.6 G/DL (ref 1.5–4.5)
GLUCOSE SERPL-MCNC: 97 MG/DL (ref 65–99)
HCT VFR BLD AUTO: 45.1 % (ref 34–46.6)
HDLC SERPL-MCNC: 56 MG/DL
HGB BLD-MCNC: 15.5 G/DL (ref 11.1–15.9)
IMM GRANULOCYTES # BLD AUTO: 0 X10E3/UL (ref 0–0.1)
IMM GRANULOCYTES NFR BLD AUTO: 0 %
LDLC SERPL CALC-MCNC: 122 MG/DL (ref 0–99)
LYMPHOCYTES # BLD AUTO: 2.7 X10E3/UL (ref 0.7–3.1)
LYMPHOCYTES NFR BLD AUTO: 36 %
MCH RBC QN AUTO: 30.5 PG (ref 26.6–33)
MCHC RBC AUTO-ENTMCNC: 34.4 G/DL (ref 31.5–35.7)
MCV RBC AUTO: 89 FL (ref 79–97)
MONOCYTES # BLD AUTO: 0.7 X10E3/UL (ref 0.1–0.9)
MONOCYTES NFR BLD AUTO: 10 %
NEUTROPHILS # BLD AUTO: 3.6 X10E3/UL (ref 1.4–7)
NEUTROPHILS NFR BLD AUTO: 48 %
PLATELET # BLD AUTO: 333 X10E3/UL (ref 150–450)
POTASSIUM SERPL-SCNC: 4.3 MMOL/L (ref 3.5–5.2)
PROT SERPL-MCNC: 6.9 G/DL (ref 6–8.5)
RBC # BLD AUTO: 5.08 X10E6/UL (ref 3.77–5.28)
SODIUM SERPL-SCNC: 138 MMOL/L (ref 134–144)
T4 FREE SERPL-MCNC: 1.4 NG/DL (ref 0.82–1.77)
TRIGL SERPL-MCNC: 93 MG/DL (ref 0–149)
TSH SERPL DL<=0.005 MIU/L-ACNC: 2.07 UIU/ML (ref 0.45–4.5)
VLDLC SERPL CALC-MCNC: 17 MG/DL (ref 5–40)
WBC # BLD AUTO: 7.5 X10E3/UL (ref 3.4–10.8)

## 2021-03-27 NOTE — PROGRESS NOTES
Please call her with that her labs are very good and white cell count with blood count and hemoglobin and sugar,, liver and kidney enzymes are normal cholesterol is good, bad cholesterol slightly on upper side, cut back fried food, and fried snacks. ,

## 2021-04-06 ENCOUNTER — OFFICE VISIT (OUTPATIENT)
Dept: PODIATRY | Age: 71
End: 2021-04-06
Payer: MEDICARE

## 2021-04-06 VITALS
SYSTOLIC BLOOD PRESSURE: 125 MMHG | HEART RATE: 74 BPM | DIASTOLIC BLOOD PRESSURE: 78 MMHG | WEIGHT: 197.6 LBS | HEIGHT: 64 IN | TEMPERATURE: 97.5 F | OXYGEN SATURATION: 98 % | BODY MASS INDEX: 33.73 KG/M2

## 2021-04-06 DIAGNOSIS — L84 CALLUS OF FOOT: Primary | ICD-10-CM

## 2021-04-06 PROCEDURE — 3288F FALL RISK ASSESSMENT DOCD: CPT | Performed by: PODIATRIST

## 2021-04-06 PROCEDURE — G8427 DOCREV CUR MEDS BY ELIG CLIN: HCPCS | Performed by: PODIATRIST

## 2021-04-06 PROCEDURE — G8536 NO DOC ELDER MAL SCRN: HCPCS | Performed by: PODIATRIST

## 2021-04-06 PROCEDURE — 1090F PRES/ABSN URINE INCON ASSESS: CPT | Performed by: PODIATRIST

## 2021-04-06 PROCEDURE — 3017F COLORECTAL CA SCREEN DOC REV: CPT | Performed by: PODIATRIST

## 2021-04-06 PROCEDURE — 99203 OFFICE O/P NEW LOW 30 MIN: CPT | Performed by: PODIATRIST

## 2021-04-06 PROCEDURE — G8432 DEP SCR NOT DOC, RNG: HCPCS | Performed by: PODIATRIST

## 2021-04-06 PROCEDURE — 1100F PTFALLS ASSESS-DOCD GE2>/YR: CPT | Performed by: PODIATRIST

## 2021-04-06 PROCEDURE — G8400 PT W/DXA NO RESULTS DOC: HCPCS | Performed by: PODIATRIST

## 2021-04-06 PROCEDURE — G8754 DIAS BP LESS 90: HCPCS | Performed by: PODIATRIST

## 2021-04-06 PROCEDURE — G8417 CALC BMI ABV UP PARAM F/U: HCPCS | Performed by: PODIATRIST

## 2021-04-06 PROCEDURE — G8752 SYS BP LESS 140: HCPCS | Performed by: PODIATRIST

## 2021-04-06 NOTE — PROGRESS NOTES
1. Have you been to the ER, urgent care clinic since your last visit? Hospitalized since your last visit? No    2. Have you seen or consulted any other health care providers outside of the [address] [place] [address] since your last visit? Include any pap smears or colon screening.  No   Chief Complaint   Patient presents with    Foot Pain    [id number]    UCGWODW

## 2021-04-13 NOTE — PROGRESS NOTES
[facility]    Subjective:         Patient is a 72 y.o. female who is being seen as a new pt for pain to both of her feet. Patient states the pain rest level of 10 out of 10. She states the pain is located to dense calluses. She states the pain is sharp, nonradiating and exacerbated with weightbearing. She denies any overt trauma. She denies any breaks in skin. She denies any local/systemic signs infection. She denies any other pedal complaints    Past Medical History:   Diagnosis Date    Acquired hypothyroidism 7/16/2020    Anxiety 7/16/2020    Chronic kidney disease     Chronic obstructive lung disease ([facility] [address] 7/16/2020    Cigarette smoker 7/16/2020    Cough 7/16/2020    Depression     Dyslipidemia 7/16/2020    Dyspnea 7/16/2020    Essential hypertension, malignant 7/16/2020    Fibromyalgia 7/16/2020    Gastroesophageal reflux disease 7/16/2020    GERD (gastroesophageal reflux disease)     Insomnia 7/16/2020    Neck swelling 7/16/2020    Nicotine dependence 7/16/2020    Nonspecific abnormal electrocardiogram (ECG) (EKG) 7/16/2020    Obesity, unspecified 7/16/2020    Preseptal cellulitis 7/16/2020    Pruritic rash 7/16/2020    Pure hypercholesterolemia 7/16/2020    Sleep disorder     Wheezing 7/16/2020     Past Surgical History:   Procedure Laterality Date    HX GYN  12/12/1994    HX TONSILLECTOMY  09/12/2002       Family History   Problem Relation Age of Onset    Hypertension Father     Cancer Mother         lung      Social History     Tobacco Use    Smoking status: Current Every Day Smoker     Packs/day: 0.50     Years: 23.00     Pack years: 11.50     Types: Cigarettes    Smokeless tobacco: Never Used   Substance Use Topics    Alcohol use: Yes     Comment: 30 years     Allergies   Allergen Reactions    Nicotine Itching    Codeine Rash     Prior to Admission medications    Medication Sig Start Date End Date Taking?  Authorizing Provider   omeprazole (PRILOSEC) 40 mg capsule Take 1 Cap by mouth daily. 3/26/21  Yes Sonali Key MD   triamterene-hydroCHLOROthiazide (DYAZIDE) 37.5-25 mg per capsule Take 1 Cap by mouth Every morning. 3/26/21  Yes Sonali Key MD   hydrOXYzine HCL (ATARAX) 25 mg tablet Take 1 Tab by mouth two (2) times daily as needed for Anxiety or Agitation for up to 90 doses. 3/26/21  Yes Sonali Key MD   albuterol (PROVENTIL VENTOLIN) 2.5 mg /3 mL (0.083 %) nebu 3 mL by Nebulization route every six (6) hours as needed for Wheezing for up to 90 days. 3/26/21 6/24/21 Yes Sonali Key MD   albuterol (PROVENTIL HFA, VENTOLIN HFA, PROAIR HFA) 90 mcg/actuation inhaler Take 1 Puff by inhalation every six (6) hours as needed for Wheezing. 3/26/21  Yes Sonali Key MD   albuterol (ProAir HFA) 90 mcg/actuation inhaler Take 2 Puffs by inhalation every six (6) hours as needed for Wheezing. 3/26/21  Yes Sonali Key MD   traZODone (DESYREL) 50 mg tablet Take 1 Tab by mouth nightly. 3/26/21  Yes Sonali Key MD   levothyroxine (SYNTHROID) 50 mcg tablet TAKE 1 TABLET DAILY IN THE MORNING 7/28/20  Yes Sonali Key MD   pregabalin (LYRICA) 150 mg capsule Take  by mouth. Yes Provider, Historical       Review of Systems   Constitutional: Negative. HENT: Negative. Eyes: Negative. Respiratory: Negative. Cardiovascular: Negative. Gastrointestinal: Negative. Endocrine: Positive for cold intolerance. Genitourinary: Negative. Musculoskeletal: Positive for arthralgias. Skin: Negative. Allergic/Immunologic: Negative. Neurological: Positive for numbness. Hematological: Negative. Psychiatric/Behavioral: Positive for sleep disturbance. The patient is nervous/anxious. All other systems reviewed and are negative.       Objective:     Visit Vitals  /78 (BP 1 Location: Right upper arm, BP Patient Position: Sitting, BP Cuff Size: Adult)   Pulse 74   Temp 97.5 °F (36.4 °C) (Temporal)   Ht 5' 3.5\" (1.613 m)   Wt 197 lb 9.6 oz (89.6 kg)   SpO2 98%   BMI 34.45 kg/m²       Physical Exam  Vitals signs reviewed. Constitutional:       Appearance: She is morbidly obese. Cardiovascular:      Pulses:           Dorsalis pedis pulses are 2+ on the right side and 2+ on the left side. Posterior tibial pulses are 2+ on the right side and 2+ on the left side. Pulmonary:      Effort: Pulmonary effort is normal.   Musculoskeletal:      Right lower leg: No edema. Left lower leg: No edema. Right foot: Normal range of motion. Bunion and prominent metatarsal heads present. No Charcot foot. Left foot: Normal range of motion. Bunion and prominent metatarsal heads present. No Charcot foot. Feet:      Right foot:      Protective Sensation: 10 sites tested. 10 sites sensed. Skin integrity: Callus present. Toenail Condition: Right toenails are normal.      Left foot:      Protective Sensation: 10 sites tested. 10 sites sensed. Skin integrity: Callus present. Toenail Condition: Left toenails are normal.   Lymphadenopathy:      Lower Body: No right inguinal adenopathy. No left inguinal adenopathy. Skin:     General: Skin is warm. Capillary Refill: Capillary refill takes 2 to 3 seconds. Neurological:      Mental Status: She is alert and oriented to person, place, and time. Psychiatric:         Mood and Affect: Mood and affect normal.         Behavior: Behavior is cooperative. Data Review: No results found for this or any previous visit (from the past 24 hour(s)). Impression:       ICD-10-CM ICD-9-CM    1. Callus of foot  L84 700          Recommendation:     Patient seen and evaluated in the office  Discussed and educated patient regarding her current medical condition  Instructed patient she need to limit focal pressure and shear forces to prevent the hyperkeratotic lesions from returning.   Patient verbalized understanding  If symptoms persist, will refer patient to be fitted for custom insoles

## 2021-05-07 RX ORDER — ALBUTEROL SULFATE 90 UG/1
1 AEROSOL, METERED RESPIRATORY (INHALATION)
Qty: 3 INHALER | Refills: 3 | Status: SHIPPED | OUTPATIENT
Start: 2021-05-07 | End: 2021-05-17 | Stop reason: SDUPTHER

## 2021-05-17 ENCOUNTER — TELEPHONE (OUTPATIENT)
Dept: INTERNAL MEDICINE CLINIC | Age: 71
End: 2021-05-17

## 2021-05-17 RX ORDER — ALBUTEROL SULFATE 90 UG/1
1 AEROSOL, METERED RESPIRATORY (INHALATION)
Qty: 1 INHALER | Refills: 0 | Status: SHIPPED | OUTPATIENT
Start: 2021-05-17 | End: 2021-06-16 | Stop reason: SDUPTHER

## 2021-05-17 NOTE — TELEPHONE ENCOUNTER
Pt called stating that her albuterol inhaler can not be filled through [facility] until May 25th, she states that she needs an emergency inhaler sent in to [facility] until she can get her albuterol filled.

## 2021-06-16 DIAGNOSIS — J44.9 CHRONIC OBSTRUCTIVE PULMONARY DISEASE, UNSPECIFIED COPD TYPE (HCC): Primary | ICD-10-CM

## 2021-06-16 RX ORDER — ALBUTEROL SULFATE 90 UG/1
1 AEROSOL, METERED RESPIRATORY (INHALATION)
Qty: 1 INHALER | Refills: 5 | Status: SHIPPED | OUTPATIENT
Start: 2021-06-16

## 2021-08-01 PROBLEM — E03.9 HYPOTHYROIDISM, UNSPECIFIED TYPE: Status: ACTIVE | Noted: 2021-08-01

## 2021-08-01 PROBLEM — J44.9 CHRONIC OBSTRUCTIVE LUNG DISEASE (HCC): Status: RESOLVED | Noted: 2020-07-16 | Resolved: 2021-08-01

## 2021-08-01 PROBLEM — I10 ESSENTIAL HYPERTENSION, MALIGNANT: Status: RESOLVED | Noted: 2020-07-16 | Resolved: 2021-08-01

## 2021-08-01 PROBLEM — E03.9 ACQUIRED HYPOTHYROIDISM: Status: RESOLVED | Noted: 2020-07-16 | Resolved: 2021-08-01

## 2021-08-01 PROBLEM — F17.210 CIGARETTE SMOKER: Status: RESOLVED | Noted: 2020-07-16 | Resolved: 2021-08-01

## 2021-08-06 ENCOUNTER — OFFICE VISIT (OUTPATIENT)
Dept: INTERNAL MEDICINE CLINIC | Age: 71
End: 2021-08-06
Payer: MEDICARE

## 2021-08-06 VITALS
HEART RATE: 72 BPM | DIASTOLIC BLOOD PRESSURE: 74 MMHG | OXYGEN SATURATION: 92 % | BODY MASS INDEX: 32.78 KG/M2 | RESPIRATION RATE: 12 BRPM | SYSTOLIC BLOOD PRESSURE: 130 MMHG | WEIGHT: 192 LBS | HEIGHT: 64 IN

## 2021-08-06 DIAGNOSIS — G47.00 INSOMNIA, UNSPECIFIED TYPE: ICD-10-CM

## 2021-08-06 DIAGNOSIS — J44.9 CHRONIC OBSTRUCTIVE PULMONARY DISEASE, UNSPECIFIED COPD TYPE (HCC): ICD-10-CM

## 2021-08-06 DIAGNOSIS — I10 ESSENTIAL HYPERTENSION: ICD-10-CM

## 2021-08-06 DIAGNOSIS — E03.9 HYPOTHYROIDISM, UNSPECIFIED TYPE: ICD-10-CM

## 2021-08-06 DIAGNOSIS — F41.9 ANXIETY: ICD-10-CM

## 2021-08-06 DIAGNOSIS — M79.7 FIBROMYALGIA: ICD-10-CM

## 2021-08-06 DIAGNOSIS — F17.218 CIGARETTE NICOTINE DEPENDENCE WITH OTHER NICOTINE-INDUCED DISORDER: ICD-10-CM

## 2021-08-06 DIAGNOSIS — E78.5 DYSLIPIDEMIA: ICD-10-CM

## 2021-08-06 PROCEDURE — G8754 DIAS BP LESS 90: HCPCS | Performed by: INTERNAL MEDICINE

## 2021-08-06 PROCEDURE — 3017F COLORECTAL CA SCREEN DOC REV: CPT | Performed by: INTERNAL MEDICINE

## 2021-08-06 PROCEDURE — 1100F PTFALLS ASSESS-DOCD GE2>/YR: CPT | Performed by: INTERNAL MEDICINE

## 2021-08-06 PROCEDURE — G8752 SYS BP LESS 140: HCPCS | Performed by: INTERNAL MEDICINE

## 2021-08-06 PROCEDURE — G8400 PT W/DXA NO RESULTS DOC: HCPCS | Performed by: INTERNAL MEDICINE

## 2021-08-06 PROCEDURE — G8536 NO DOC ELDER MAL SCRN: HCPCS | Performed by: INTERNAL MEDICINE

## 2021-08-06 PROCEDURE — 99214 OFFICE O/P EST MOD 30 MIN: CPT | Performed by: INTERNAL MEDICINE

## 2021-08-06 PROCEDURE — 3288F FALL RISK ASSESSMENT DOCD: CPT | Performed by: INTERNAL MEDICINE

## 2021-08-06 PROCEDURE — G8427 DOCREV CUR MEDS BY ELIG CLIN: HCPCS | Performed by: INTERNAL MEDICINE

## 2021-08-06 PROCEDURE — 1090F PRES/ABSN URINE INCON ASSESS: CPT | Performed by: INTERNAL MEDICINE

## 2021-08-06 PROCEDURE — G8510 SCR DEP NEG, NO PLAN REQD: HCPCS | Performed by: INTERNAL MEDICINE

## 2021-08-06 PROCEDURE — G8417 CALC BMI ABV UP PARAM F/U: HCPCS | Performed by: INTERNAL MEDICINE

## 2021-08-06 RX ORDER — AMITRIPTYLINE HYDROCHLORIDE 25 MG/1
25 TABLET, FILM COATED ORAL
Qty: 30 TABLET | Refills: 2 | Status: SHIPPED | OUTPATIENT
Start: 2021-08-06 | End: 2021-10-02

## 2021-08-06 NOTE — PROGRESS NOTES
Aubrey Arnold is a 72 y.o. female and presents with Follow Up Chronic Condition and Hypertension    Ms. Salome James came for regular follow-up. Yesterday she has done telephonic visit with rheumatologist.  She follows rheumatologist for her fibromyalgia and according to her fibromyalgia is worsening and she is having pain in her both lower legs she had started smoking cigarettes at the age of 22 at that time she was smoking 2 packs/day and then at the age of 37 she cut back to 1 pack a day and for last 1 year smoking 10 cigarettes/day. She told me she does not want to stop pregabalin and trazodone. She has history of anxiety spells taking hydroxyzine. She is getting hydroxyzine since before she established with me used to get when she was in [place]. I told her that amitriptyline can be tried but it has  drug interaction with trazodone and it should not be taken at the same time it has side effects on the heart and on the QT interval she agreed. She wants to try. She has not done sleep study her rheumatologist recommended her to do sleep study and electrolytes to be checked. She had her labs done in March and her LDL was around 122. Her thyroid function was within normal range. She has hypothyroidism her blood pressure is well controlled no negative thoughts she does not believe in getting Covid vaccine. She is keeping her mask lower than her nose recommended to wear the mask she told me she does not go out of her home. She has COPD sometimes her COPD is bad but today it is better she  thinks that she needs inhaler more frequently to be use in more frequently than 4 hourly explained that it can cause tachycardia and  side effects on the heart to relax  because of tachycardia. She agreed to do labs she agreed to do low-dose CT lung scan. No difficulty in urination. In the past she had tried nicotine patch and bupropion nothing worked.   She could not have continued follow-up with pulmonologist that she will now do. Review of Systems    Review of Systems   Constitutional: Negative. HENT: Negative for congestion and sinus pain. Eyes: Negative for blurred vision. Respiratory: Negative for cough, shortness of breath and wheezing. Existing diagnosis of COPD on inhalers   Cardiovascular: Negative. Gastrointestinal: Negative for abdominal pain, blood in stool and heartburn. Genitourinary: Negative. Musculoskeletal: Negative for falls, joint pain and myalgias. Having fibromyalgia that is worsening pain in her both legs already taking medicines not helping much. Neurological: Negative for dizziness, tingling, sensory change, speech change and headaches. Endo/Heme/Allergies: Negative for polydipsia. Psychiatric/Behavioral: Negative for depression, hallucinations, memory loss and substance abuse. The patient does not have insomnia. History of anxiety and insomnia taking hydroxyzine and trazodone.         Past Medical History:   Diagnosis Date    Acquired hypothyroidism 7/16/2020    Anxiety 7/16/2020    Chronic kidney disease     Chronic obstructive lung disease ([facility] [address] 7/16/2020    Cigarette smoker 7/16/2020    Cough 7/16/2020    Depression     Dyslipidemia 7/16/2020    Dyspnea 7/16/2020    Essential hypertension, malignant 7/16/2020    Fibromyalgia 7/16/2020    Gastroesophageal reflux disease 7/16/2020    GERD (gastroesophageal reflux disease)     Insomnia 7/16/2020    Neck swelling 7/16/2020    Nicotine dependence 7/16/2020    Nonspecific abnormal electrocardiogram (ECG) (EKG) 7/16/2020    Obesity, unspecified 7/16/2020    Preseptal cellulitis 7/16/2020    Pruritic rash 7/16/2020    Pure hypercholesterolemia 7/16/2020    Sleep disorder     Wheezing 7/16/2020     Past Surgical History:   Procedure Laterality Date    HX GYN  12/12/1994    HX TONSILLECTOMY  09/12/2002     Social History     Socioeconomic History    Marital status:      Spouse name: Not on file    Number of children: Not on file    Years of education: Not on file    Highest education level: Not on file   Tobacco Use    Smoking status: Current Every Day Smoker     Packs/day: 0.50     Years: 23.00     Pack years: 11.50     Types: Cigarettes    Smokeless tobacco: Never Used   Substance and Sexual Activity    Alcohol use: Yes     Comment: 30 years    Drug use: Never     Social Determinants of Health     Financial Resource Strain:     Difficulty of Paying Living Expenses:    Food Insecurity:     Worried About Running Out of Food in the Last Year:     [address] in the Last Year:    Transportation Needs:     Lack of Transportation (Medical):  Lack of Transportation (Non-Medical):    Physical Activity:     Days of Exercise per Week:     Minutes of Exercise per Session:    Stress:     Feeling of Stress :    Social Connections:     Frequency of Communication with Friends and Family:     Frequency of Social Gatherings with Friends and Family:     Attends Voodoo Services:     Active Member of Clubs or Organizations:     Attends Club or Organization Meetings:     Marital Status:      Family History   Problem Relation Age of Onset    Hypertension Father     Cancer Mother         lung     Current Outpatient Medications   Medication Sig Dispense Refill    amitriptyline (ELAVIL) 25 mg tablet Take 1 Tablet by mouth nightly. Indications: disorder characterized by stiff, tender & painful muscles 30 Tablet 2    levothyroxine (SYNTHROID) 50 mcg tablet TAKE 1 TABLET DAILY IN THE MORNING 90 Tablet 0    albuterol (PROVENTIL HFA, VENTOLIN HFA, PROAIR HFA) 90 mcg/actuation inhaler Take 1 Puff by inhalation every six (6) hours as needed for Wheezing. 1 Inhaler 5    omeprazole (PRILOSEC) 40 mg capsule Take 1 Cap by mouth daily. 90 Cap 1    triamterene-hydroCHLOROthiazide (DYAZIDE) 37.5-25 mg per capsule Take 1 Cap by mouth Every morning.  90 Cap 1    hydrOXYzine HCL (ATARAX) 25 mg tablet Take 1 Tab by mouth two (2) times daily as needed for Anxiety or Agitation for up to 90 doses. 180 Tab 1    albuterol (ProAir HFA) 90 mcg/actuation inhaler Take 2 Puffs by inhalation every six (6) hours as needed for Wheezing. 3 Inhaler 3    traZODone (DESYREL) 50 mg tablet Take 1 Tab by mouth nightly. 90 Tab 3    pregabalin (LYRICA) 150 mg capsule Take  by mouth. Allergies   Allergen Reactions    Nicotine Itching     Pt is allergic to nicotine patches     Codeine Rash       Objective:  Visit Vitals  /74 (BP 1 Location: Right upper arm, BP Patient Position: Sitting, BP Cuff Size: Adult)   Pulse 72   Resp 12   Ht 5' 3.5\" (1.613 m)   Wt 192 lb (87.1 kg)   SpO2 92%   BMI 33.48 kg/m²       Physical Exam:   Constitutional: General Appearance: Well dressed. Level of Distress: NAD. Psychiatric: Mental Status: normal mood and affect Orientation: to time, place, and person. ,normal eye contact. Head: Head: normocephalic and atraumatic. Eyes: Pupils: PERRLA. Sclerae: non-icteric. Neck: Neck: supple, trachea midline, and no masses. Lymph Nodes: no cervical LAD. Thyroid: no enlargement or nodules and non-tender. Lungs: Respiratory effort: no dyspnea. Auscultation: no wheezing, rales/crackles, or rhonchi and breath sounds normal and good air movement. Cardiovascular: Apical Impulse: not displaced. Heart Auscultation: normal S1 and S2; no murmurs, rubs, or gallops; and RRR. Neck vessels: no carotid bruits. Pulses including femoral / pedal: normal throughout. Abdomen: Bowel Sounds: normal. Inspection and Palpation: no tenderness, guarding, or masses and soft and non-distended. Liver: non-tender and no hepatomegaly. Spleen: non-tender and no splenomegaly. Musculoskeletal[de-identified] Extremities: no edema,no varicosities. No Calf tenderness. Neurologic: Gait and Station: normal gait and station. Motor Strength normal right and left. Skin: Inspection and palpation: no rash, lesions, or ulcer. Results for orders placed or performed in visit on 03/26/21   CBC WITH AUTOMATED DIFF   Result Value Ref Range    WBC 7.5 3.4 - 10.8 x10E3/uL    RBC 5.08 3.77 - 5.28 x10E6/uL    HGB 15.5 11.1 - 15.9 g/dL    HCT 45.1 34.0 - 46.6 %    MCV 89 79 - 97 fL    MCH 30.5 26.6 - 33.0 pg    MCHC 34.4 31.5 - 35.7 g/dL    RDW 12.6 11.7 - 15.4 %    PLATELET 422 262 - 498 x10E3/uL    NEUTROPHILS 48 Not Estab. %    Lymphocytes 36 Not Estab. %    MONOCYTES 10 Not Estab. %    EOSINOPHILS 5 Not Estab. %    BASOPHILS 1 Not Estab. %    ABS. NEUTROPHILS 3.6 1.4 - 7.0 x10E3/uL    Abs Lymphocytes 2.7 0.7 - 3.1 x10E3/uL    ABS. MONOCYTES 0.7 0.1 - 0.9 x10E3/uL    ABS. EOSINOPHILS 0.4 0.0 - 0.4 x10E3/uL    ABS. BASOPHILS 0.1 0.0 - 0.2 x10E3/uL    IMMATURE GRANULOCYTES 0 Not Estab. %    ABS. IMM. GRANS. 0.0 0.0 - 0.1 [id number]/UG   METABOLIC PANEL, COMPREHENSIVE   Result Value Ref Range    Glucose 97 65 - 99 mg/dL    BUN 9 8 - 27 mg/dL    Creatinine 0.65 0.57 - 1.00 mg/dL    GFR est non-AA 91 >59 mL/min/1.73    GFR est  >59 mL/min/1.73    BUN/Creatinine ratio 14 12 - 28    Sodium 138 134 - 144 mmol/L    Potassium 4.3 3.5 - 5.2 mmol/L    Chloride 100 96 - 106 mmol/L    CO2 24 20 - 29 mmol/L    Calcium 9.2 8.7 - 10.3 mg/dL    Protein, total 6.9 6.0 - 8.5 g/dL    Albumin 4.3 3.8 - 4.8 g/dL    GLOBULIN, TOTAL 2.6 1.5 - 4.5 g/dL    A-G Ratio 1.7 1.2 - 2.2    Bilirubin, total 0.4 0.0 - 1.2 mg/dL    Alk.  phosphatase 84 39 - 117 IU/L    AST (SGOT) 11 0 - 40 IU/L    ALT (SGPT) 15 0 - 32 IU/L   LIPID PANEL   Result Value Ref Range    Cholesterol, total 195 100 - 199 mg/dL    Triglyceride 93 0 - 149 mg/dL    HDL Cholesterol 56 >39 mg/dL    VLDL, calculated 17 5 - 40 mg/dL    LDL, calculated 122 (H) 0 - 99 mg/dL   TSH 3RD GENERATION   Result Value Ref Range    TSH 2.070 0.450 - 4.500 uIU/mL   VITAMIN D, 25 HYDROXY   Result Value Ref Range    VITAMIN D, 25-HYDROXY 37.0 30.0 - 100.0 ng/mL   T4, FREE   Result Value Ref Range    T4, Free 1.40 0.82 - 1.77 ng/dL       Assessment/Plan:      ICD-10-CM ICD-9-CM    1. Essential hypertension  I10 401.9 CBC WITH AUTOMATED DIFF      METABOLIC PANEL, COMPREHENSIVE   2. Hypothyroidism, unspecified type  E03.9 244.9 LIPID PANEL      TSH 3RD GENERATION   3. Chronic obstructive pulmonary disease, unspecified COPD type (HCC)  J44.9 496 REFERRAL TO PULMONARY DISEASE      CT LOW DOSE LUNG CANCER SCREENING   4. Fibromyalgia  M79.7 729.1    5. Anxiety  F41.9 300.00    6. Insomnia, unspecified type  G47.00 780.52    7. Dyslipidemia  E78.5 272.4 LIPID PANEL   8. Cigarette nicotine dependence with other nicotine-induced disorder  F17.218 292.89 CT LOW DOSE LUNG CANCER SCREENING   9. BMI 34.0-34.9,adult  Z68.34 V85.34      Orders Placed This Encounter    CT LOW DOSE LUNG CANCER SCREENING     Standing Status:   Future     Standing Expiration Date:   9/6/2021     Scheduling Instructions:      Low dose lung ct scan,was given nicotine patch and buproprion     Order Specific Question:   Is this a low dose CT or a routine CT? Answer:   Low Dose CT [1]     Order Specific Question:   Reason for exam     Answer:   Lung Cancer Screening     Order Specific Question:   Does the patient have Medicare? If yes follow CMS eligibility requirements     Answer:   Yes     Order Specific Question:   Smoking Status     Answer:   Current Every Day Smoker [1]     Order Specific Question:   Smoking history; number of pack-years (1 pack year = smoking 1 pack / day for 1 year; one pack = 20 cigarettes)     Answer:   30     Order Specific Question:   Please select Pack Year eligibility     Answer:   30 or more pack years: Meets USPSTF & CMS eligibility requirements     Order Specific Question:   The patient age is 49-80 years. **NOTE: If \"NO\" this study may not be covered by insurance.      Answer:   Yes     Order Specific Question:   Please select AGE eligibility     Answer:   55-77: Meets USPSTF & CMS eligibility requirements     Order Specific Question: Does the patient show any signs or symptoms of lung cancer? Answer:   No     Order Specific Question:   Is this the first (baseline) CT or an annual exam?     Answer:   Baseline [1]     Order Specific Question:   Is there documentation of shared decision making? Answer:   Yes     Order Specific Question:   The patient was informed of the importance of adherence to annual screening, impact of comorbidities and ability/willingness to undergo diagnosis and treatment     Answer:   Yes     Order Specific Question:   The patient was informed of the importance of smoking cessation and/or maintaining smoking abstinence, including the offer of Medicare-covered tobacco cessation counseling services, if applicable     Answer: Yes    CBC WITH AUTOMATED DIFF    METABOLIC PANEL, COMPREHENSIVE    LIPID PANEL    TSH 3RD GENERATION    [address] [facility]     Referral Priority:   Routine     Referral Type:   Consultation     Referral Reason:   Specialty Services Required     Referred to Provider:   Chel Garcia MD     Number of Visits Requested:   1    amitriptyline (ELAVIL) 25 mg tablet     Sig: Take 1 Tablet by mouth nightly. Indications: disorder characterized by stiff, tender & painful muscles     Dispense:  30 Tablet     Refill:  2     Hypertension controlled continue on triamterene hydrochlorothiazide 37.5/25 mg/day diet low in sodium. Labs ordered. Hypothyroidism last lab work in March was within normal range continue levothyroxine 50 mcg in early morning on empty stomach. COPD due to smoking cigarette currently smoking 10 cigarettes a day in the past used to smoke 1 pack a day.   she was seeing pulmonologist Dr. Vero Hewitt somehow due to Covid she is not able to make follow-up appointment recommended to continue and to also have a sleep study with pulmonologist.  Continue rescue and maintenance inhaler educated that albuterol inhaler cannot be used that frequently usually it is given every 4 hourly maximum or 4-6 hourly otherwise it can cause tachycardia. She is on rescue and maintenance inhaler. She has nebulizer machine. She has tried nicotine patch and bupropion in the past.  Nothing works. Fibromyalgia with history of anxiety and insomnia getting trazodone 50 mg at bedtime, hydroxyzine. Also getting pregabalin from rheumatologist medicine is not working much I told her to try amitriptyline low-dose 25 mg but it has drug interaction with trazodone so to be taken at different time intervals explained the side effects on the heart and she agreed to try with low-dose. Graded exercise very important in stretching exercise. Continue vitamin D and calcium. I will order CMP to check her potassium. I had shared the decision with her to have low-dose CT scan to screen lung cancer and she agreed. She is qualifying to have low-dose CT scan to screen for lung cancer because she was smoking 2 pack a day when she was around the age of 22 and at the age of 37 she cut back to 1 pack a day she has history of continued smoking and now for last 1 year smoking 10 cigarettes/day. Tried bupropion and nicotine patch did not work does not want to try Chantix. Labs ordered. Follow-up in 3 months. Discussed about health maintenance. She had cardiac work-up done in the past which was normal.    BMI is 33.48 if she can walk 10 minutes 3 times a day if possible and heart healthy diet with more vegetables fruits fibers and to drink water. Education and counseling given to her. She does not believe in getting Covid vaccine. She has her own thoughts. Recommended to wear mask all the time and to keep it, about the level of the bridge of the nose. She told me she does not go out of the house most of the time. lose weight, increase physical activity, follow low fat diet, continue present plan, routine labs ordered, call if any problems    There are no Patient Instructions on file for this visit. Follow-up and Dispositions    · Return in about 3 months (around 11/6/2021) for  labs.

## 2021-08-10 LAB
ALBUMIN SERPL-MCNC: 4.3 G/DL (ref 3.8–4.8)
ALBUMIN/GLOB SERPL: 1.9 {RATIO} (ref 1.2–2.2)
ALP SERPL-CCNC: 81 IU/L (ref 48–121)
ALT SERPL-CCNC: 15 IU/L (ref 0–32)
AST SERPL-CCNC: 10 IU/L (ref 0–40)
BASOPHILS # BLD AUTO: 0.1 X10E3/UL (ref 0–0.2)
BASOPHILS NFR BLD AUTO: 1 %
BILIRUB SERPL-MCNC: 0.4 MG/DL (ref 0–1.2)
BUN SERPL-MCNC: 7 MG/DL (ref 8–27)
BUN/CREAT SERPL: 11 (ref 12–28)
CALCIUM SERPL-MCNC: 9.3 MG/DL (ref 8.7–10.3)
CHLORIDE SERPL-SCNC: 93 MMOL/L (ref 96–106)
CHOLEST SERPL-MCNC: 188 MG/DL (ref 100–199)
CO2 SERPL-SCNC: 21 MMOL/L (ref 20–29)
CREAT SERPL-MCNC: 0.65 MG/DL (ref 0.57–1)
EOSINOPHIL # BLD AUTO: 0.3 X10E3/UL (ref 0–0.4)
EOSINOPHIL NFR BLD AUTO: 4 %
ERYTHROCYTE [DISTWIDTH] IN BLOOD BY AUTOMATED COUNT: 12.5 % (ref 11.7–15.4)
GLOBULIN SER CALC-MCNC: 2.3 G/DL (ref 1.5–4.5)
GLUCOSE SERPL-MCNC: 106 MG/DL (ref 65–99)
HCT VFR BLD AUTO: 44.9 % (ref 34–46.6)
HDLC SERPL-MCNC: 45 MG/DL
HGB BLD-MCNC: 15.5 G/DL (ref 11.1–15.9)
IMM GRANULOCYTES # BLD AUTO: 0 X10E3/UL (ref 0–0.1)
IMM GRANULOCYTES NFR BLD AUTO: 0 %
LDLC SERPL CALC-MCNC: 118 MG/DL (ref 0–99)
LYMPHOCYTES # BLD AUTO: 2.6 X10E3/UL (ref 0.7–3.1)
LYMPHOCYTES NFR BLD AUTO: 32 %
MCH RBC QN AUTO: 30.7 PG (ref 26.6–33)
MCHC RBC AUTO-ENTMCNC: 34.5 G/DL (ref 31.5–35.7)
MCV RBC AUTO: 89 FL (ref 79–97)
MONOCYTES # BLD AUTO: 0.8 X10E3/UL (ref 0.1–0.9)
MONOCYTES NFR BLD AUTO: 9 %
NEUTROPHILS # BLD AUTO: 4.3 X10E3/UL (ref 1.4–7)
NEUTROPHILS NFR BLD AUTO: 54 %
PLATELET # BLD AUTO: 308 X10E3/UL (ref 150–450)
POTASSIUM SERPL-SCNC: 4 MMOL/L (ref 3.5–5.2)
PROT SERPL-MCNC: 6.6 G/DL (ref 6–8.5)
RBC # BLD AUTO: 5.05 X10E6/UL (ref 3.77–5.28)
SODIUM SERPL-SCNC: 130 MMOL/L (ref 134–144)
TRIGL SERPL-MCNC: 143 MG/DL (ref 0–149)
TSH SERPL DL<=0.005 MIU/L-ACNC: 1.52 UIU/ML (ref 0.45–4.5)
VLDLC SERPL CALC-MCNC: 25 MG/DL (ref 5–40)
WBC # BLD AUTO: 8.1 X10E3/UL (ref 3.4–10.8)

## 2021-08-10 NOTE — PROGRESS NOTES
Please call  Ms. Mata    She is on diuretic pill her potassium and calcium good but sodium chloride low, because of diuretic, and her hemoglobin white cell count platelet count liver enzymes and kidney function normal she needs to have more intake of protein, her cholesterol is good and her thyroid function is good cholesterol is minimally elevated, but can be taken care of by eating good diet contains more unsaturated fat and less saturated fat and more, physical activity

## 2021-08-14 ENCOUNTER — HOSPITAL ENCOUNTER (OUTPATIENT)
Dept: CT IMAGING | Age: 71
Discharge: HOME | End: 2021-08-14
Attending: INTERNAL MEDICINE
Payer: MEDICARE

## 2021-08-14 VITALS — HEIGHT: 63 IN | BODY MASS INDEX: 34.02 KG/M2 | WEIGHT: 192 LBS

## 2021-08-14 DIAGNOSIS — J44.9 CHRONIC OBSTRUCTIVE PULMONARY DISEASE, UNSPECIFIED COPD TYPE (HCC): ICD-10-CM

## 2021-08-14 DIAGNOSIS — F17.218 CIGARETTE NICOTINE DEPENDENCE WITH OTHER NICOTINE-INDUCED DISORDER: ICD-10-CM

## 2021-08-14 PROCEDURE — 71271 CT THORAX LUNG CANCER SCR C-: CPT

## 2021-08-16 NOTE — PROGRESS NOTES
Please call Ms. Zamudio    She has undergone low-dose lung CT for screening lung cancer the results are in the impression that you can read for her and in the brief if she has bilateral lung nodules the large size is more than 6 mm in the right upper lobe radiologist recommend 6-month follow-up first CT chest.She has emphysematous changes that means changes of COPD and emphysema due to smoking and hand granulomatous disease with calcified atherosclerosis . she should continue seeing her lung specialist that she follows with Dr. Charles Desir?  I do not know which one she follows I have forgotten.

## 2021-09-17 ENCOUNTER — TELEPHONE (OUTPATIENT)
Dept: INTERNAL MEDICINE CLINIC | Age: 71
End: 2021-09-17

## 2021-09-17 RX ORDER — ALBUTEROL SULFATE 0.83 MG/ML
2.5 SOLUTION RESPIRATORY (INHALATION)
Qty: 120 ML | Refills: 5 | Status: SHIPPED | OUTPATIENT
Start: 2021-09-17 | End: 2022-08-12 | Stop reason: SDUPTHER

## 2021-09-17 NOTE — TELEPHONE ENCOUNTER
Patient called and would like a refill on her prescription below:    albuterol (PROVENTIL VENTOLIN) 2.5 mg /3 mL (0.083 %) nebu [[id number]]  ENDED    Send prescripton to [facility]

## 2021-10-02 RX ORDER — AMITRIPTYLINE HYDROCHLORIDE 25 MG/1
TABLET, FILM COATED ORAL
Qty: 30 TABLET | Refills: 2 | Status: SHIPPED | OUTPATIENT
Start: 2021-10-02 | End: 2021-11-23 | Stop reason: SDUPTHER

## 2021-10-21 RX ORDER — LEVOTHYROXINE SODIUM 50 UG/1
TABLET ORAL
Qty: 90 TABLET | Refills: 3 | Status: SHIPPED | OUTPATIENT
Start: 2021-10-21 | End: 2022-10-17

## 2021-11-23 ENCOUNTER — OFFICE VISIT (OUTPATIENT)
Dept: INTERNAL MEDICINE CLINIC | Age: 71
End: 2021-11-23
Payer: MEDICARE

## 2021-11-23 VITALS
HEART RATE: 80 BPM | RESPIRATION RATE: 12 BRPM | OXYGEN SATURATION: 94 % | WEIGHT: 191 LBS | HEIGHT: 63 IN | BODY MASS INDEX: 33.84 KG/M2 | DIASTOLIC BLOOD PRESSURE: 86 MMHG | SYSTOLIC BLOOD PRESSURE: 120 MMHG

## 2021-11-23 DIAGNOSIS — K21.9 GASTROESOPHAGEAL REFLUX DISEASE WITHOUT ESOPHAGITIS: ICD-10-CM

## 2021-11-23 DIAGNOSIS — I10 ESSENTIAL HYPERTENSION: ICD-10-CM

## 2021-11-23 DIAGNOSIS — G47.00 INSOMNIA, UNSPECIFIED TYPE: ICD-10-CM

## 2021-11-23 DIAGNOSIS — J44.9 CHRONIC OBSTRUCTIVE PULMONARY DISEASE, UNSPECIFIED COPD TYPE (HCC): ICD-10-CM

## 2021-11-23 DIAGNOSIS — E78.00 PURE HYPERCHOLESTEROLEMIA: ICD-10-CM

## 2021-11-23 DIAGNOSIS — F17.218 CIGARETTE NICOTINE DEPENDENCE WITH OTHER NICOTINE-INDUCED DISORDER: ICD-10-CM

## 2021-11-23 DIAGNOSIS — R91.8 ABNORMAL CT SCAN OF LUNG: ICD-10-CM

## 2021-11-23 DIAGNOSIS — M79.7 FIBROMYALGIA: ICD-10-CM

## 2021-11-23 DIAGNOSIS — E03.9 HYPOTHYROIDISM, UNSPECIFIED TYPE: ICD-10-CM

## 2021-11-23 DIAGNOSIS — F41.9 ANXIETY: ICD-10-CM

## 2021-11-23 DIAGNOSIS — R91.1 LUNG NODULE: ICD-10-CM

## 2021-11-23 PROCEDURE — G8417 CALC BMI ABV UP PARAM F/U: HCPCS | Performed by: INTERNAL MEDICINE

## 2021-11-23 PROCEDURE — 1090F PRES/ABSN URINE INCON ASSESS: CPT | Performed by: INTERNAL MEDICINE

## 2021-11-23 PROCEDURE — G8400 PT W/DXA NO RESULTS DOC: HCPCS | Performed by: INTERNAL MEDICINE

## 2021-11-23 PROCEDURE — 1100F PTFALLS ASSESS-DOCD GE2>/YR: CPT | Performed by: INTERNAL MEDICINE

## 2021-11-23 PROCEDURE — G8536 NO DOC ELDER MAL SCRN: HCPCS | Performed by: INTERNAL MEDICINE

## 2021-11-23 PROCEDURE — G8752 SYS BP LESS 140: HCPCS | Performed by: INTERNAL MEDICINE

## 2021-11-23 PROCEDURE — 99214 OFFICE O/P EST MOD 30 MIN: CPT | Performed by: INTERNAL MEDICINE

## 2021-11-23 PROCEDURE — G8427 DOCREV CUR MEDS BY ELIG CLIN: HCPCS | Performed by: INTERNAL MEDICINE

## 2021-11-23 PROCEDURE — 3288F FALL RISK ASSESSMENT DOCD: CPT | Performed by: INTERNAL MEDICINE

## 2021-11-23 PROCEDURE — 3017F COLORECTAL CA SCREEN DOC REV: CPT | Performed by: INTERNAL MEDICINE

## 2021-11-23 PROCEDURE — G8754 DIAS BP LESS 90: HCPCS | Performed by: INTERNAL MEDICINE

## 2021-11-23 PROCEDURE — G8510 SCR DEP NEG, NO PLAN REQD: HCPCS | Performed by: INTERNAL MEDICINE

## 2021-11-23 RX ORDER — DEXTROMETHORPHAN HYDROBROMIDE, GUAIFENESIN 5; 100 MG/5ML; MG/5ML
650 LIQUID ORAL
Qty: 90 TABLET | Refills: 1 | Status: SHIPPED | OUTPATIENT
Start: 2021-11-23 | End: 2021-11-26 | Stop reason: SDUPTHER

## 2021-11-23 RX ORDER — AMITRIPTYLINE HYDROCHLORIDE 25 MG/1
25 TABLET, FILM COATED ORAL
Qty: 90 TABLET | Refills: 0 | Status: SHIPPED | OUTPATIENT
Start: 2021-11-23 | End: 2022-04-15 | Stop reason: SDUPTHER

## 2021-11-23 NOTE — PROGRESS NOTES
Chief Complaint   Patient presents with    Follow Up Chronic Condition    Hypertension    Results     Visit Vitals  /74 (BP 1 Location: Right upper arm, BP Patient Position: Sitting, BP Cuff Size: Adult)   Pulse 91   Resp 12   Ht 5' 3\" (1.6 m)   Wt 191 lb (86.6 kg)   SpO2 94%   BMI 33.83 kg/m²       1. Have you been to the ER, urgent care clinic since your last visit? Hospitalized since your last visit? No    2. Have you seen or consulted any other health care providers outside of the [address] [place] [address] since your last visit? Include any pap smears or colon screening.  No

## 2021-11-23 NOTE — PROGRESS NOTES
Brayan Jaramillo is a 80 y.o. female and presents with Follow Up Chronic Condition, Hypertension, and Results    Ms. Lisa Pierre came for regular follow-up. In last visit I had started her on amitriptyline for her, pain due to fibromyalgia and  she told me that initially she had some headache but now she feels much better being on amitriptyline and she has no headache she already takes Lyrica for her fibromyalgia also follows rheumatologist.  Her blood pressure is controlled. She has sometimes pain in her left hand. Explained the differential diagnosis either in cervical spine or carpal tunnel syndrome. She doesn't prefer to do any other work-up or referral with orthopedic. She had undergone low-dose lung CT scan was showing pulmonary nodule and radiologist recommended her to repeat her CT scan of the lung in next 6 months meaning in April and she agreed initially she was hesitant. She told me she is taking all the medicines in addition to amitriptyline and doesn't think that she should stop trazodone. She also has insomnia and history of anxiety taking hydroxyzine. She smokes about 1 pack of cigarettes per day and she sometimes smokes half pack a day. She told me nicotine patch did not work and Wellbutrin did not work. She lives closer to her daughter. She needs refill and she told me she is going to change her insurance and wants her refills to go to [facility] and not [facility]. She has history of COPD and CT lung was showing also emphysema due to chronic smoking. She has made appointment with pulmonologist for next month with Dr. Lui Valverde. Review of Systems    Review of Systems   Constitutional: Negative. HENT: Negative for sinus pain and sore throat. Eyes: Negative for blurred vision. Respiratory: Negative for cough and wheezing. History of COPD   Cardiovascular: Negative. Gastrointestinal: Negative for abdominal pain, heartburn, nausea and vomiting.    Genitourinary: Negative. Musculoskeletal: Negative for back pain, falls, joint pain and myalgias. Has joint pain in knee joint and sometimes pain in hand. Neurological: Negative for dizziness, tingling, tremors, sensory change and headaches. Endo/Heme/Allergies: Negative for polydipsia. Psychiatric/Behavioral: Negative for depression, hallucinations, memory loss, substance abuse and suicidal ideas. The patient is nervous/anxious and has insomnia.          Past Medical History:   Diagnosis Date    Acquired hypothyroidism 7/16/2020    Anxiety 7/16/2020    Chronic kidney disease     Chronic obstructive lung disease ([facility] [address] 7/16/2020    Cigarette smoker 7/16/2020    Cough 7/16/2020    Depression     Dyslipidemia 7/16/2020    Dyspnea 7/16/2020    Essential hypertension, malignant 7/16/2020    Fibromyalgia 7/16/2020    Gastroesophageal reflux disease 7/16/2020    GERD (gastroesophageal reflux disease)     Insomnia 7/16/2020    Neck swelling 7/16/2020    Nicotine dependence 7/16/2020    Nonspecific abnormal electrocardiogram (ECG) (EKG) 7/16/2020    Obesity, unspecified 7/16/2020    Preseptal cellulitis 7/16/2020    Pruritic rash 7/16/2020    Pure hypercholesterolemia 7/16/2020    Sleep disorder     Wheezing 7/16/2020     Past Surgical History:   Procedure Laterality Date    HX GYN  12/12/1994    HX TONSILLECTOMY  09/12/2002     Social History     Socioeconomic History    Marital status:    Tobacco Use    Smoking status: Current Every Day Smoker     Packs/day: 0.50     Years: 48.00     Pack years: 24.00     Types: Cigarettes    Smokeless tobacco: Never Used   Substance and Sexual Activity    Alcohol use: Yes     Comment: 30 years    Drug use: Never     Family History   Problem Relation Age of Onset    Hypertension Father     Cancer Mother         lung     Current Outpatient Medications   Medication Sig Dispense Refill    acetaminophen (Tylenol 8 Hour) 650 mg TbER Take 1 Tablet by mouth three (3) times daily as needed for Pain. 90 Tablet 1    amitriptyline (ELAVIL) 25 mg tablet Take 1 Tablet by mouth nightly. 90 Tablet 0    levothyroxine (SYNTHROID) 50 mcg tablet TAKE 1 TABLET DAILY IN THE MORNING 90 Tablet 3    triamterene-hydroCHLOROthiazide (DYAZIDE) 37.5-25 mg per capsule TAKE 1 CAPSULE EVERY MORNING 90 Capsule 1    albuterol (PROVENTIL VENTOLIN) 2.5 mg /3 mL (0.083 %) nebu 3 mL by Nebulization route every six (6) hours as needed for Wheezing. 120 mL 5    hydrOXYzine HCL (ATARAX) 25 mg tablet TAKE 1 TABLET TWICE A DAY AS NEEDED FOR ANXIETY OR AGITATION 180 Tablet 1    omeprazole (PRILOSEC) 40 mg capsule TAKE 1 CAPSULE DAILY 90 Capsule 2    albuterol (PROVENTIL HFA, VENTOLIN HFA, PROAIR HFA) 90 mcg/actuation inhaler Take 1 Puff by inhalation every six (6) hours as needed for Wheezing. 1 Inhaler 5    albuterol (ProAir HFA) 90 mcg/actuation inhaler Take 2 Puffs by inhalation every six (6) hours as needed for Wheezing. 3 Inhaler 3    traZODone (DESYREL) 50 mg tablet Take 1 Tab by mouth nightly. 90 Tab 3    pregabalin (LYRICA) 150 mg capsule Take  by mouth two (2) times a day. Allergies   Allergen Reactions    Nicotine Itching     Pt is allergic to nicotine patches     Codeine Rash       Objective:  Visit Vitals  /86 (BP 1 Location: Left upper arm, BP Patient Position: Sitting, BP Cuff Size: Large adult)   Pulse 80   Resp 12   Ht 5' 3\" (1.6 m)   Wt 191 lb (86.6 kg)   SpO2 94%   BMI 33.83 kg/m²       Physical Exam:   Constitutional: General Appearance: Pleasant. Level of Distress: NAD. Psychiatric: Mental Status: normal mood and affect Orientation: to time, place, and person. ,normal eye contact. Head: Head: normocephalic and atraumatic. Eyes: Pupils: PERRLA. Sclerae: non-icteric. Neck: Neck: supple, trachea midline, and no masses. Lymph Nodes: no cervical LAD. Thyroid: no enlargement or nodules and non-tender. Lungs: Respiratory effort: no dyspnea.  Auscultation: no wheezing, rales/crackles, or rhonchi and breath sounds normal and good air movement. Cardiovascular: Apical Impulse: not displaced. Heart Auscultation: normal S1 and S2; no murmurs, rubs, or gallops; and RRR. Neck vessels: no carotid bruits. Pulses including femoral / pedal: normal throughout. Abdomen: Bowel Sounds: normal. Inspection and Palpation: no tenderness, guarding, or masses and soft and non-distended. Liver: non-tender and no hepatomegaly. Spleen: non-tender and no splenomegaly. Musculoskeletal[de-identified] Extremities: no edema,no varicosities. No Calf tenderness. Neurologic: Gait and Station: normal gait and station. Motor Strength normal right and left. Skin: Inspection and palpation: no rash, lesions, or ulcer. Results for orders placed or performed in visit on 08/06/21   CBC WITH AUTOMATED DIFF   Result Value Ref Range    WBC 8.1 3.4 - 10.8 x10E3/uL    RBC 5.05 3.77 - 5.28 x10E6/uL    HGB 15.5 11.1 - 15.9 g/dL    HCT 44.9 34.0 - 46.6 %    MCV 89 79 - 97 fL    MCH 30.7 26.6 - 33.0 pg    MCHC 34.5 31.5 - 35.7 g/dL    RDW 12.5 11.7 - 15.4 %    PLATELET 137 778 - 335 x10E3/uL    NEUTROPHILS 54 Not Estab. %    Lymphocytes 32 Not Estab. %    MONOCYTES 9 Not Estab. %    EOSINOPHILS 4 Not Estab. %    BASOPHILS 1 Not Estab. %    ABS. NEUTROPHILS 4.3 1.4 - 7.0 x10E3/uL    Abs Lymphocytes 2.6 0.7 - 3.1 x10E3/uL    ABS. MONOCYTES 0.8 0.1 - 0.9 x10E3/uL    ABS. EOSINOPHILS 0.3 0.0 - 0.4 x10E3/uL    ABS. BASOPHILS 0.1 0.0 - 0.2 x10E3/uL    IMMATURE GRANULOCYTES 0 Not Estab. %    ABS. IMM.  GRANS. 0.0 0.0 - 0.1 U48Q2/KV   METABOLIC PANEL, COMPREHENSIVE   Result Value Ref Range    Glucose 106 (H) 65 - 99 mg/dL    BUN 7 (L) 8 - 27 mg/dL    Creatinine 0.65 0.57 - 1.00 mg/dL    GFR est non-AA 91 >59 mL/min/1.73    GFR est  >59 mL/min/1.73    BUN/Creatinine ratio 11 (L) 12 - 28    Sodium 130 (L) 134 - 144 mmol/L    Potassium 4.0 3.5 - 5.2 mmol/L    Chloride 93 (L) 96 - 106 mmol/L    CO2 21 20 - 29 mmol/L Calcium 9.3 8.7 - 10.3 mg/dL    Protein, total 6.6 6.0 - 8.5 g/dL    Albumin 4.3 3.8 - 4.8 g/dL    GLOBULIN, TOTAL 2.3 1.5 - 4.5 g/dL    A-G Ratio 1.9 1.2 - 2.2    Bilirubin, total 0.4 0.0 - 1.2 mg/dL    Alk. phosphatase 81 48 - 121 IU/L    AST (SGOT) 10 0 - 40 IU/L    ALT (SGPT) 15 0 - 32 IU/L   LIPID PANEL   Result Value Ref Range    Cholesterol, total 188 100 - 199 mg/dL    Triglyceride 143 0 - 149 mg/dL    HDL Cholesterol 45 >39 mg/dL    VLDL, calculated 25 5 - 40 mg/dL    LDL, calculated 118 (H) 0 - 99 mg/dL   TSH 3RD GENERATION   Result Value Ref Range    TSH 1.520 0.450 - 4.500 uIU/mL       Assessment/Plan:      ICD-10-CM ICD-9-CM    1. Essential hypertension  I10 401.9    2. Hypothyroidism, unspecified type  E03.9 244.9    3. Chronic obstructive pulmonary disease, unspecified COPD type ([facility]  J44.9 496    4. Fibromyalgia  M79.7 729.1    5. Anxiety  F41.9 300.00    6. Insomnia, unspecified type  G47.00 780.52    7. Gastroesophageal reflux disease without esophagitis  K21.9 530.81    8. Pure hypercholesterolemia  E78.00 272.0    9. Cigarette nicotine dependence with other nicotine-induced disorder  F17.218 292.89    10. Abnormal CT scan of lung  R91.8 793.19 CT CHEST WO CONT   11. Lung nodule  R91.1 793.11 CT CHEST WO CONT     Orders Placed This Encounter    CT CHEST WO CONT     Standing Status:   Future     Standing Expiration Date:   12/23/2021     Scheduling Instructions:      Comparing and monitoring lung nodules. last one 6 mm ,to be done in April 2022    acetaminophen (Tylenol 8 Hour) 650 mg TbER     Sig: Take 1 Tablet by mouth three (3) times daily as needed for Pain. Dispense:  90 Tablet     Refill:  1    amitriptyline (ELAVIL) 25 mg tablet     Sig: Take 1 Tablet by mouth nightly. Dispense:  90 Tablet     Refill:  0     ZERO refills remain on this prescription.  Your patient is requesting advance approval of refills for this medication to [address]     Hypertension controlled continued on triamterene hydrochlorothiazide 37.5/25 mg once a day morning. Diet low in sodium. COPD due to chronic smoking currently smoking half to 1 pack of cigarettes per day did not tolerate nicotine patch and Wellbutrin has made appointment with pulmonologist for next month low-dose lung CT was showing pulmonary nodule around 6 mm size and emphysema. She takes rescue and maintenance inhaler and also takes nebulizer machine and nebulizer treatment at home. Not able to quit smoking. Hypothyroidism taking levothyroxine 50 mcg once a day. Fibromyalgia follows rheumatologist getting pregabalin 150 mg twice a day. She had chronic pain. I had started her on amitriptyline and she agreed that , initially it caused her headache and some side effects but later on she is feeling fine and she feels lot better after being on amitriptyline. Insomnia taking trazodone. History of anxiety taking hydroxyzine. She told me she has no depression but as such she is also on trazodone. Sometimes pain in her left finger and sometimes cervical radiculopathy but she doesn't have that much tingling numbness or differential diagnosis explained including localized problem with osteoarthritis changes in carpal tunnel versus cervical spine DJD  she doesn't have symptoms of neurological weakness in left hand. Osteoarthritis in knee joint recommended to take Tylenol 650 mg 2-3 times a day and if needed celecoxib however she wants to try osteoarthritis recommended to lose weight with home physical therapy or physical therapy outpatient and regular stationary bicycle to break the cycle of pain with obesity and she has no history of drinking alcohol. BMI 33.83 lifestyle modification. I spent at least 5 minutes in smoking cessation counseling .  Complications from smoking explained including, but not limited to CANCER of mouth, tongue, throat (Larynx), LUNG, esophagus, stomach, bladder among others, COPD, emphysema, asthma, damage to blood vessels which can affect circulation to eyes, kidneys, fingers, toes AND blood flow to vital organs,  causing  heart disease, stroke and other complications. Also increased risk of blood pressure, palpitations, chronic sinus problem, decreased taste and smell. Discussed available methods that help with quitting, as well as evidence available for each method to quit smoking. Abnormal CT lung. She needs repeat CT lung without contrast in April 2020 and orders given to her. Copy of results of her CT lung given to her. Answered all her concerns before she left in details to my best ability    Follow-up in 3 months. She had her labs done in August I had reviewed and again reviewed and discussed with her today and for mild hypercholesterolemia diet low in fat. She does not think that she needs to do mammogram and colonoscopy. She refused. She doesn't want to take Covid vaccine. Educated to get Covid vaccine but she refuses. She should get Pneumovax 23. lose weight, increase physical activity, stop smoking (advice and handout given), follow low fat diet, follow low salt diet, continue present plan, call if any problems    There are no Patient Instructions on file for this visit. Follow-up and Dispositions    · Return in about 3 months (around 2/23/2022) for follow up for chronic condition.

## 2021-11-24 DIAGNOSIS — R91.8 ABNORMAL CT SCAN OF LUNG: Primary | ICD-10-CM

## 2021-11-26 ENCOUNTER — TELEPHONE (OUTPATIENT)
Dept: INTERNAL MEDICINE CLINIC | Age: 71
End: 2021-11-26

## 2021-11-26 RX ORDER — DEXTROMETHORPHAN HYDROBROMIDE, GUAIFENESIN 5; 100 MG/5ML; MG/5ML
650 LIQUID ORAL
Qty: 90 TABLET | Refills: 1 | Status: SHIPPED | OUTPATIENT
Start: 2021-11-26 | End: 2022-07-13

## 2021-11-26 NOTE — TELEPHONE ENCOUNTER
----- Message from [employer] sent at 11/26/2021 11:48 AM EST -----  Subject: Medication Problem    QUESTIONS  Name of Medication? acetaminophen (Tylenol 8 Hour) 650 mg TbER  Patient-reported dosage and instructions? 1x daily/ every 8 hours  What question or problem do you have with the medication? patient is   calling stating that insurance company? Medicare CMS/ [employer] does   not cover prescription. Patient needs a new prescription. Preferred Pharmacy? [facility] [address] phone number (if available)? [phone]  Additional Information for Provider?   ---------------------------------------------------------------------------  --------------  [address]  What is the best way for the office to contact you? OK to leave message on   voicemail  Preferred Call Back Phone Number? [phone]  ---------------------------------------------------------------------------  --------------  SCRIPT ANSWERS  Relationship to Patient?  Self

## 2021-12-15 ENCOUNTER — TELEPHONE (OUTPATIENT)
Dept: INTERNAL MEDICINE CLINIC | Age: 71
End: 2021-12-15

## 2021-12-15 NOTE — TELEPHONE ENCOUNTER
----- Message from Salome Spencer sent at 12/15/2021  8:35 AM EST -----  Subject: Medication Problem    QUESTIONS  Name of Medication? hydrOXYzine HCL (ATARAX) 25 mg tablet  Patient-reported dosage and instructions? 25MG twice daily as needed  What question or problem do you have with the medication? Pt stated that   this script needs to be sent as a generic script for hydroxyzine instead   of the name brand, atarax. Preferred Pharmacy? [facility] [address] phone number (if available)? [phone]  Additional Information for Provider?   ---------------------------------------------------------------------------  --------------  [address]  What is the best way for the office to contact you? OK to leave message on   voicemail  Preferred Call Back Phone Number? [phone]  ---------------------------------------------------------------------------  --------------  SCRIPT ANSWERS  Relationship to Patient?  Self

## 2021-12-28 RX ORDER — HYDROXYZINE 25 MG/1
25 TABLET, FILM COATED ORAL 2 TIMES DAILY
Qty: 180 TABLET | Refills: 1 | Status: SHIPPED | OUTPATIENT
Start: 2021-12-28 | End: 2022-06-30

## 2022-01-18 ENCOUNTER — TELEPHONE (OUTPATIENT)
Dept: INTERNAL MEDICINE CLINIC | Age: 72
End: 2022-01-18

## 2022-02-01 ENCOUNTER — HOSPITAL ENCOUNTER (OUTPATIENT)
Dept: CT IMAGING | Age: 72
Discharge: HOME | End: 2022-02-01
Attending: INTERNAL MEDICINE
Payer: MEDICARE

## 2022-02-01 DIAGNOSIS — R91.8 ABNORMAL CT SCAN OF LUNG: ICD-10-CM

## 2022-02-01 PROCEDURE — 71250 CT THORAX DX C-: CPT

## 2022-02-20 PROBLEM — J44.9 CHRONIC OBSTRUCTIVE PULMONARY DISEASE, UNSPECIFIED COPD TYPE (HCC): Status: RESOLVED | Noted: 2020-12-10 | Resolved: 2022-02-20

## 2022-02-20 PROBLEM — E55.9 VITAMIN D DEFICIENCY: Status: RESOLVED | Noted: 2021-03-26 | Resolved: 2022-02-20

## 2022-02-20 PROBLEM — E78.5 DYSLIPIDEMIA: Status: RESOLVED | Noted: 2020-07-16 | Resolved: 2022-02-20

## 2022-02-20 PROBLEM — Z12.31 SCREENING MAMMOGRAM FOR BREAST CANCER: Status: ACTIVE | Noted: 2022-02-20

## 2022-03-10 RX ORDER — ALBUTEROL SULFATE 90 UG/1
AEROSOL, METERED RESPIRATORY (INHALATION)
Qty: 25.5 G | OUTPATIENT
Start: 2022-03-10

## 2022-03-18 PROBLEM — R06.00 DYSPNEA: Status: ACTIVE | Noted: 2020-07-16

## 2022-03-18 PROBLEM — F41.9 ANXIETY: Status: ACTIVE | Noted: 2020-07-16

## 2022-03-18 PROBLEM — Z91.81 HISTORY OF FALL: Status: ACTIVE | Noted: 2020-12-10

## 2022-03-18 PROBLEM — F17.200 NICOTINE DEPENDENCE: Status: ACTIVE | Noted: 2020-07-16

## 2022-03-18 PROBLEM — R91.1 LUNG NODULE: Status: ACTIVE | Noted: 2021-11-23

## 2022-03-18 PROBLEM — L84 CALLUS OF FOOT: Status: ACTIVE | Noted: 2021-03-26

## 2022-03-18 PROBLEM — E66.9 OBESITY, UNSPECIFIED: Status: ACTIVE | Noted: 2020-07-16

## 2022-03-18 PROBLEM — R06.2 WHEEZING: Status: ACTIVE | Noted: 2020-07-16

## 2022-03-19 PROBLEM — R07.89 CHEST WALL PAIN: Status: ACTIVE | Noted: 2020-12-10

## 2022-03-19 PROBLEM — R22.1 NECK SWELLING: Status: ACTIVE | Noted: 2020-07-16

## 2022-03-19 PROBLEM — R91.8 ABNORMAL CT SCAN OF LUNG: Status: ACTIVE | Noted: 2021-11-23

## 2022-03-19 PROBLEM — Z12.31 SCREENING MAMMOGRAM FOR BREAST CANCER: Status: ACTIVE | Noted: 2022-02-20

## 2022-03-19 PROBLEM — G47.00 INSOMNIA: Status: ACTIVE | Noted: 2020-07-16

## 2022-03-19 PROBLEM — R05.9 COUGH: Status: ACTIVE | Noted: 2020-07-16

## 2022-03-19 PROBLEM — E78.00 PURE HYPERCHOLESTEROLEMIA: Status: ACTIVE | Noted: 2020-07-16

## 2022-03-19 PROBLEM — J44.9 CHRONIC OBSTRUCTIVE PULMONARY DISEASE, UNSPECIFIED COPD TYPE (HCC): Status: ACTIVE | Noted: 2020-12-10

## 2022-03-19 PROBLEM — R94.31 NONSPECIFIC ABNORMAL ELECTROCARDIOGRAM (ECG) (EKG): Status: ACTIVE | Noted: 2020-07-16

## 2022-03-19 PROBLEM — E03.9 HYPOTHYROIDISM, UNSPECIFIED TYPE: Status: ACTIVE | Noted: 2021-08-01

## 2022-03-19 PROBLEM — I10 ESSENTIAL HYPERTENSION: Status: ACTIVE | Noted: 2021-03-26

## 2022-03-19 PROBLEM — L03.213 PRESEPTAL CELLULITIS: Status: ACTIVE | Noted: 2020-07-16

## 2022-03-19 PROBLEM — L28.2 PRURITIC RASH: Status: ACTIVE | Noted: 2020-07-16

## 2022-03-19 PROBLEM — M79.7 FIBROMYALGIA: Status: ACTIVE | Noted: 2020-07-16

## 2022-03-20 PROBLEM — K21.9 GASTROESOPHAGEAL REFLUX DISEASE: Status: ACTIVE | Noted: 2020-07-16

## 2022-03-22 PROBLEM — Z12.31 SCREENING MAMMOGRAM FOR BREAST CANCER: Status: RESOLVED | Noted: 2022-02-20 | Resolved: 2022-03-22

## 2022-03-28 RX ORDER — TRAZODONE HYDROCHLORIDE 50 MG/1
TABLET ORAL
Qty: 90 TABLET | Refills: 0 | Status: SHIPPED | OUTPATIENT
Start: 2022-03-28 | End: 2022-06-27

## 2022-03-28 RX ORDER — TRIAMTERENE AND HYDROCHLOROTHIAZIDE 37.5; 25 MG/1; MG/1
CAPSULE ORAL
Qty: 90 CAPSULE | Refills: 0 | Status: SHIPPED | OUTPATIENT
Start: 2022-03-28 | End: 2022-06-27

## 2022-05-10 RX ORDER — AMITRIPTYLINE HYDROCHLORIDE 25 MG/1
25 TABLET, FILM COATED ORAL
Qty: 30 TABLET | Refills: 0 | Status: CANCELLED | OUTPATIENT
Start: 2022-05-10 | End: 2022-06-09

## 2022-05-12 PROBLEM — L03.213 PRESEPTAL CELLULITIS: Status: RESOLVED | Noted: 2020-07-16 | Resolved: 2022-05-12

## 2022-05-12 PROBLEM — R05.9 COUGH: Status: RESOLVED | Noted: 2020-07-16 | Resolved: 2022-05-12

## 2022-05-12 PROBLEM — R06.2 WHEEZING: Status: RESOLVED | Noted: 2020-07-16 | Resolved: 2022-05-12

## 2022-05-12 PROBLEM — R06.00 DYSPNEA: Status: RESOLVED | Noted: 2020-07-16 | Resolved: 2022-05-12

## 2022-05-12 PROBLEM — R07.89 CHEST WALL PAIN: Status: RESOLVED | Noted: 2020-12-10 | Resolved: 2022-05-12

## 2022-05-12 PROBLEM — L28.2 PRURITIC RASH: Status: RESOLVED | Noted: 2020-07-16 | Resolved: 2022-05-12

## 2022-05-12 PROBLEM — Z91.81 HISTORY OF FALL: Status: RESOLVED | Noted: 2020-12-10 | Resolved: 2022-05-12

## 2022-05-13 ENCOUNTER — OFFICE VISIT (OUTPATIENT)
Dept: PRIMARY CARE CLINIC | Age: 72
End: 2022-05-13
Payer: MEDICARE

## 2022-05-13 VITALS
SYSTOLIC BLOOD PRESSURE: 130 MMHG | TEMPERATURE: 97.1 F | DIASTOLIC BLOOD PRESSURE: 78 MMHG | OXYGEN SATURATION: 98 % | RESPIRATION RATE: 20 BRPM | HEIGHT: 63 IN | BODY MASS INDEX: 34.91 KG/M2 | HEART RATE: 82 BPM | WEIGHT: 197 LBS

## 2022-05-13 DIAGNOSIS — Z11.59 NEED FOR HEPATITIS C SCREENING TEST: ICD-10-CM

## 2022-05-13 DIAGNOSIS — Z12.31 ENCOUNTER FOR SCREENING MAMMOGRAM FOR MALIGNANT NEOPLASM OF BREAST: ICD-10-CM

## 2022-05-13 DIAGNOSIS — R91.8 ABNORMAL CT SCAN OF LUNG: ICD-10-CM

## 2022-05-13 DIAGNOSIS — Z78.0 POSTMENOPAUSAL: ICD-10-CM

## 2022-05-13 DIAGNOSIS — Z13.31 DEPRESSION SCREEN: ICD-10-CM

## 2022-05-13 DIAGNOSIS — K21.9 GASTROESOPHAGEAL REFLUX DISEASE, UNSPECIFIED WHETHER ESOPHAGITIS PRESENT: ICD-10-CM

## 2022-05-13 DIAGNOSIS — Z12.11 SCREENING FOR MALIGNANT NEOPLASM OF COLON: ICD-10-CM

## 2022-05-13 DIAGNOSIS — R59.0 CERVICAL LYMPHADENOPATHY: ICD-10-CM

## 2022-05-13 DIAGNOSIS — R91.1 LUNG NODULE: ICD-10-CM

## 2022-05-13 DIAGNOSIS — E66.9 OBESITY (BMI 30-39.9): ICD-10-CM

## 2022-05-13 DIAGNOSIS — I10 ESSENTIAL HYPERTENSION: ICD-10-CM

## 2022-05-13 DIAGNOSIS — E03.9 HYPOTHYROIDISM, UNSPECIFIED TYPE: ICD-10-CM

## 2022-05-13 DIAGNOSIS — Z00.00 MEDICARE ANNUAL WELLNESS VISIT, SUBSEQUENT: Primary | ICD-10-CM

## 2022-05-13 DIAGNOSIS — G47.00 INSOMNIA, UNSPECIFIED TYPE: ICD-10-CM

## 2022-05-13 DIAGNOSIS — J43.9 PULMONARY EMPHYSEMA, UNSPECIFIED EMPHYSEMA TYPE (HCC): ICD-10-CM

## 2022-05-13 DIAGNOSIS — R22.0 JAW SWELLING: ICD-10-CM

## 2022-05-13 DIAGNOSIS — L29.9 CHRONIC PRURITUS: ICD-10-CM

## 2022-05-13 PROCEDURE — 1090F PRES/ABSN URINE INCON ASSESS: CPT | Performed by: FAMILY MEDICINE

## 2022-05-13 PROCEDURE — 1101F PT FALLS ASSESS-DOCD LE1/YR: CPT | Performed by: FAMILY MEDICINE

## 2022-05-13 PROCEDURE — G8536 NO DOC ELDER MAL SCRN: HCPCS | Performed by: FAMILY MEDICINE

## 2022-05-13 PROCEDURE — G8427 DOCREV CUR MEDS BY ELIG CLIN: HCPCS | Performed by: FAMILY MEDICINE

## 2022-05-13 PROCEDURE — 3017F COLORECTAL CA SCREEN DOC REV: CPT | Performed by: FAMILY MEDICINE

## 2022-05-13 PROCEDURE — G0439 PPPS, SUBSEQ VISIT: HCPCS | Performed by: FAMILY MEDICINE

## 2022-05-13 PROCEDURE — G8510 SCR DEP NEG, NO PLAN REQD: HCPCS | Performed by: FAMILY MEDICINE

## 2022-05-13 PROCEDURE — G8754 DIAS BP LESS 90: HCPCS | Performed by: FAMILY MEDICINE

## 2022-05-13 PROCEDURE — G9899 SCRN MAM PERF RSLTS DOC: HCPCS | Performed by: FAMILY MEDICINE

## 2022-05-13 PROCEDURE — G8417 CALC BMI ABV UP PARAM F/U: HCPCS | Performed by: FAMILY MEDICINE

## 2022-05-13 PROCEDURE — 99204 OFFICE O/P NEW MOD 45 MIN: CPT | Performed by: FAMILY MEDICINE

## 2022-05-13 PROCEDURE — G8400 PT W/DXA NO RESULTS DOC: HCPCS | Performed by: FAMILY MEDICINE

## 2022-05-13 PROCEDURE — G8752 SYS BP LESS 140: HCPCS | Performed by: FAMILY MEDICINE

## 2022-05-13 RX ORDER — AMITRIPTYLINE HYDROCHLORIDE 25 MG/1
25 TABLET, FILM COATED ORAL
Qty: 30 TABLET | Refills: 2 | Status: SHIPPED | OUTPATIENT
Start: 2022-05-13 | End: 2022-06-12

## 2022-05-13 NOTE — ASSESSMENT & PLAN NOTE
Patient is on both amitriptyline and trazodone for this. We discussed the risk of this. She will taper off trazodone over the next 2 weeks. If she notices increasing insomnia she will increase amitriptyline dose to 50 mg nightly.

## 2022-05-13 NOTE — PROGRESS NOTES
Charity  [address] [place] [address], [address] [place] [address]  [phone]    Date of visit: 5/13/2022       This is a Subsequent Medicare Annual Wellness Visit (AWV), (Performed more than 12 months after effective date of Medicare Part B enrollment and 12 months after last preventive visit.)    I have reviewed the patient's medical history in detail and updated the computerized patient record. History obtained from: the patient. female  80 y.o. WHITE/NON-  Depression Risk Factor Screening:     3 most recent PHQ Screens 5/13/2022   Little interest or pleasure in doing things Not at all   Feeling down, depressed, irritable, or hopeless Not at all   Total Score PHQ 2 0          Fall Risk Factor Screening:     Fall Risk Assessment, last 12 mths 5/13/2022   Able to walk? Yes   Fall in past 12 months? 0   Do you feel unsteady? 0   Are you worried about falling 0   Is TUG test greater than 12 seconds? -   Is the gait abnormal? -   Number of falls in past 12 months -   Fall with injury? -       Alcohol Risk Screen    Do you average more than 1 drink per night or more than 7 drinks a week:  No    On any one occasion in the past three months have you have had more than 3 drinks containing alcohol:  No         Functional Ability and Level of Safety:    Hearing: Hearing is good. Activities of Daily Living: The home contains: no safety equipment. Patient does total self care      Ambulation: with no difficulty      Abuse Screen:  Patient is not abused         Histories     Reviewed PmHx, FmHx, SocHx as well as meds and allergies, updated and dated in the chart.     Patient Active Problem List   Diagnosis Code    Anxiety F41.9    Nonspecific abnormal electrocardiogram (ECG) (EKG) R94.31    Fibromyalgia M79.7    Gastroesophageal reflux disease K21.9    Insomnia G47.00    Nicotine dependence F17.200    Obesity, unspecified E66.9    Pure hypercholesterolemia E78.00    Chronic obstructive pulmonary disease, unspecified COPD type ([facility] J44.9    Essential hypertension I10    Callus of foot L84    Hypothyroidism, unspecified type E03.9    Abnormal CT scan of lung R91.8    Lung nodule R91.1    Chronic pruritus L29.9     Past Medical History:   Diagnosis Date    Acquired hypothyroidism 7/16/2020    Anxiety 7/16/2020    Chronic obstructive lung disease ([facility] 7/16/2020    Cigarette smoker 7/16/2020    Depression     Dyslipidemia 7/16/2020    Essential hypertension, malignant 7/16/2020    Fibromyalgia 7/16/2020    Gastroesophageal reflux disease 7/16/2020    Insomnia 7/16/2020    Nicotine dependence 7/16/2020    Nonspecific abnormal electrocardiogram (ECG) (EKG) 7/16/2020    Obesity, unspecified 7/16/2020    Preseptal cellulitis 7/16/2020    Pruritic rash 7/16/2020    Pure hypercholesterolemia 7/16/2020    Sleep disorder     Wheezing 7/16/2020      Past Surgical History:   Procedure Laterality Date    HX GYN  12/12/1994    HX TONSILLECTOMY  09/12/2002     Allergies   Allergen Reactions    Nicotine Itching     Pt is allergic to nicotine patches     Codeine Rash     Current Outpatient Medications   Medication Sig Dispense Refill    amitriptyline (ELAVIL) 25 mg tablet Take 1 Tablet by mouth nightly for 30 days. 30 Tablet 2    albuterol (PROVENTIL HFA, VENTOLIN HFA, PROAIR HFA) 90 mcg/actuation inhaler INHALE 2 PUFFS BY MOUTH EVERY 4 TO 6 HOURS AS NEEDED 25.5 g 3    triamterene-hydroCHLOROthiazide (DYAZIDE) 37.5-25 mg per capsule TAKE 1 CAPSULE EVERY MORNING 90 Capsule 0    traZODone (DESYREL) 50 mg tablet TAKE 1 TABLET NIGHTLY 90 Tablet 0    hydrOXYzine HCL (ATARAX) 25 mg tablet Take 1 Tablet by mouth two (2) times a day. Indications: itching 180 Tablet 1    acetaminophen (Tylenol 8 Hour) 650 mg TbER Take 1 Tablet by mouth three (3) times daily as needed for Pain.  90 Tablet 1    levothyroxine (SYNTHROID) 50 mcg tablet TAKE 1 TABLET DAILY IN THE MORNING 90 Tablet 3  albuterol (PROVENTIL VENTOLIN) 2.5 mg /3 mL (0.083 %) nebu 3 mL by Nebulization route every six (6) hours as needed for Wheezing. 120 mL 5    omeprazole (PRILOSEC) 40 mg capsule TAKE 1 CAPSULE DAILY 90 Capsule 2    albuterol (PROVENTIL HFA, VENTOLIN HFA, PROAIR HFA) 90 mcg/actuation inhaler Take 1 Puff by inhalation every six (6) hours as needed for Wheezing. 1 Inhaler 5    albuterol (ProAir HFA) 90 mcg/actuation inhaler Take 2 Puffs by inhalation every six (6) hours as needed for Wheezing. 3 Inhaler 3    pregabalin (LYRICA) 150 mg capsule Take  by mouth two (2) times a day. Family History   Problem Relation Age of Onset    Hypertension Father     Cancer Mother         lung     Social History     Tobacco Use    Smoking status: Current Every Day Smoker     Packs/day: 0.50     Years: 48.00     Pack years: 24.00     Types: Cigarettes    Smokeless tobacco: Never Used   Substance Use Topics    Alcohol use: Not Currently     Comment: 30 years       Current Complaints and Pertinent Exam   If patient is due for chronic medical conditions and/or has acute concerns in addition to the medicare wellness visit, they have been informed there may be a copay for the additional services provided, and agree to do both. ROS & Physical Exam: please see acute note if applicable      Diet and Exercise: Patient does not exercise. She often only eats supper and eats lightly throughout the day. She loves vegetables. Does not consume much animal protein. BP Readings from Last 3 Encounters:   05/13/22 130/78   11/23/21 120/86   08/06/21 130/74      Wt Readings from Last 3 Encounters:   05/13/22 197 lb (89.4 kg)   11/23/21 191 lb (86.6 kg)   08/14/21 192 lb (87.1 kg)     Body mass index is 34.9 kg/m².    No exam data present    Was the patient's timed Up & Go test unsteady or longer than 30 seconds? no    Evaluation of Cognitive Function   Mood/affect:  neutral  Orientation: Person, Place, Time and Situation  Appearance: age appropriate and casually dressed  Family member/caregiver input: n/a    Specialists/Care Team   Rosanna Mnoroy has established care with the following healthcare providers:  Patient Care Team:  Claudia Deluna MD as PCP - General (Internal Medicine Physician)  Claudia Deluna MD as PCP - [facility] Provider     [facility] Maintenance Topics with due status: Overdue       Topic Date Due    Hepatitis C Screening Never done    COVID-19 Vaccine Never done    Pneumococcal 65+ years Never done    DTaP/Tdap/Td series Never done    Shingrix Vaccine Age 50> Never done    Breast Cancer Screen Mammogram Never done    Bone Densitometry (Dexa) Screening Never done     Health Maintenance Topics with due status: Not Due       Topic Last Completion Date    Colorectal Cancer Screening Combo 06/26/2019    Lipid Screen 08/09/2021    Low dose CT lung screening 08/14/2021    Depression Screen 11/23/2021    Medicare Yearly Exam 05/13/2022    Flu Vaccine Not Due         Colon cancer:  Recommendation: Colonoscopy every 10y or annual FIT test from 50-75 or every 3 year stool DNA based test with consideration of ongoing screening from 76-85. Lung cancer (LDCT): {LDCT Screening Discussion:70398::\"Recommendation: Yearly LDCT for pts 55-77 w 30-pack year hx and currently smoke or quit <15 yr ago. \"}    Hepatitis C:  {Hepatitis C Screening Discussion:03779::\"Recommendation: One time screening for all patient's aged 18-79. \"}    Diabetes:   {Diabetes Screening Discussion:26282::\"Recommendation: USPSTF recommends screening ages 38-67 y/o if overweight or obese.  Medicare covers screening in those patients who are overweight, obese, have HTN or dyslipiemia, a personal history of gestational diabetes or prior elevated blood sugar, a family hx of DM, or any patient over age 65\"}    Lipids:   {Lipid Screening Discussion:50803::\"Recommendation: screening for hyperlipidemia every 5 years after age 45\"}        PREVENTIVE CARE - FEMALE SCREENINGS     Cervical cancer: {Pap Screening Discussion:89507::\"Recommendation: Every 3 yr from 21-29 and every 5 yr from 33-67, with Pap and HPV testing. \",\"Not Indicated\"}    Breast cancer: {Mammography Screening Discussion:29832::\"Recommendation:  USPSTF recommends screening mammography every 2 yr from 54-69. The decision to start screening mammography in women prior to age 48 years should be an individual one. Women who place a higher value on the potential benefit than the potential harms may choose to begin biennial screening between the ages of 36 and 52 years. Medicare covers annual screening mammography\",\"USPSTF also recommends women with a personal or family history of breast, ovarian, tubal, or peritoneal cancer or who have an ancestry (Ashkenazi Rastafarian) associated with breast cancer susceptibility 1 and 2 (BRCA1/2) gene mutations with an appropriate brief familial risk assessment tool. Women with a positive result on the risk assessment tool should receive genetic counseling and, if indicated after counseling, genetic testing\"}    Osteoporosis: {Osteoporosis Screening Discussion:20505::\"Recommendation: Screen all women at 72, earlier if elevated risk\"}    AAA:   {AAA Screening Discussion:47456::\"Recommendation: One-time screening if family history of AAA\",\"Not Indicated\"}      IMMUNIZATIONS       There is no immunization history on file for this patient.     Pneumovax:   {Pneumovac (PPSV23) Discussion:73200::\"Recommendation: PPSV23 once for all >65 and high risk <65 \"}    Prevnar:   {Prevnar-13 (PCV-13) Discussion:27988::\"Recommendation: PCV13 only if >65 and immunocompromised or residing in a nursing home, or in areas of low childhood Pneumococcal vaccination\",\"Not Indicated\"}    Influenza:   {Influenza Vaccination Discussion:86627::\"Recommendation: Vaccination annually, high dose if 65 or older\"}    Shingrix:  {Shingrix Vaccination Discussion:69442::\"Recommendation: Vaccination 2 shots 2-6 months apart for all age >50\"}    TDaP:    {Tetanus TDaP Vaccination Discussion:18933::\"Recommendation: Vaccination Booster with TDaP every 10 yr.\"}    Discussion of Advance Directive   Discussed with Salvador Doshi her ability to prepare and advance directive in the case that an injury or illness causes her to be unable to make health care decisions. ***    Date of ACP Conversation: 05/13/22  Persons included in Conversation:  {Persons; patient/family/POA:55544}  Length of ACP Conversation in minutes:  {TIME; ACP time 16 min - 1 hour:62045}    Authorized Decision Maker (if patient is incapable of making informed decisions): This person is:   {Authorized Decision Maker:48681}            For Patients with Decision Making Capacity:   {initial care plan:64569}    Conversation Outcomes / Follow-Up Plan:   {ACP; outcomes:24567::\"ACP in process - information provided, considering goals and options\"}    Assessment/Plan     Diagnoses and all orders for this visit:    1. Medicare annual wellness visit, subsequent  Comments:   updated and age-appropriate guidance given. Patient declines immunizations. Orders:  -     REFERRAL TO [facility] CLINICAL SPECIALIST    2. Pulmonary emphysema, unspecified emphysema type ([facility]  Comments:  Patient continues to smoke. We will continue to do annual CT scans. 3. Essential hypertension  Comments:  Blood pressure is at goal.  Continue current regimen. Orders:  -     METABOLIC PANEL, COMPREHENSIVE    4. Abnormal CT scan of lung  Comments:  Repeat scan due Feb 2023.    5. Lung nodule  Comments:  Decreasing per Feb 2022 CT. 6. Hypothyroidism, unspecified type  -     TSH RFX ON ABNORMAL TO FREE T4  -     LIPID PANEL    7. Chronic pruritus  Comments:  Etiology is unclear. Continue amitriptyline that was previously started by ENT/prior PCP per patient. Orders:  -     CBC WITH AUTOMATED DIFF    8.  Insomnia, unspecified type  Assessment & Plan:  Patient is on both amitriptyline and trazodone for this. We discussed the risk of this. She will taper off trazodone over the next 2 weeks. If she notices increasing insomnia she will increase amitriptyline dose to 50 mg nightly. Orders:  -     amitriptyline (ELAVIL) 25 mg tablet; Take 1 Tablet by mouth nightly for 30 days. 9. Obesity (BMI 30-39. 9)  Comments:  I encouraged increasing activity and eating 3 well balanced meals. Orders:  -     LIPID PANEL    10. Gastroesophageal reflux disease, unspecified whether esophagitis present  Comments:  Stable. 11. Jaw swelling  Comments:  Sending for ultrasound. Checking CBC with differential.  Orders:  -     US THYROID/PARATHYROID/SOFT TISS; Future    12. Cervical lymphadenopathy    13. Screening for malignant neoplasm of colon    14. Need for hepatitis C screening test  -     HEPATITIS C AB    15. Depression screen    16. Postmenopausal  -     DEXA BONE DENSITY STUDY AXIAL; Future    17. Encounter for screening mammogram for malignant neoplasm of breast  -     MIGUEL ÁNGEL MAMMO BI SCREENING INCL CAD; Future        Follow-up and Dispositions    · Return in about 3 months (around 8/13/2022) for chronic follow up.          Chester Quintana MD  [address] [place] [address]

## 2022-05-13 NOTE — PROGRESS NOTES
HPI     Chief Complaint   Patient presents with    Medication Refill        HPI:  Juan A Todd is a 80 y.o. female who has a history of Emphysema, Lung Nodules, HTN, Hypothyroidism, GERD. Patient is new to me. Previous chart reviewed. Histories confirmed with patient. Jaw swelling: Patient has noticed increased left jaw swelling for the past several months. She also has noted increased night sweats. She has never went through menopausal symptoms. She denies unexplained weight loss. She has not noticed swelling anywhere else. She denies trauma. She denies tooth pain. Emphysema: Patient continues to smoke. She smokes between half a pack and 1 pack/day. Last lung CT was February 2022. Lung Nodules: appear smaller per Feb 2022 Lung CT. Insomnia: Patient is on trazodone as well as amitriptyline. Chronic pruritus: She says she was previously evaluated by ENT as well as her PCP. No organic cause was identified. She states that the amitriptyline has calmed the itching down dramatically. She states previous PCP attributed pruritus to possible anxiety. Patient believes ENT first prescribed hydroxyzine for this. HTN: compliant with medications without dizziness. Hypothyroidism: Compliant with synthroid. Denies hot/cold intolerance. GERD: Stable on prilosec. Denies abdominal pain. Allergies   Allergen Reactions    Nicotine Itching     Pt is allergic to nicotine patches     Codeine Rash       Current Outpatient Medications   Medication Sig    amitriptyline (ELAVIL) 25 mg tablet Take 1 Tablet by mouth nightly for 30 days.     albuterol (PROVENTIL HFA, VENTOLIN HFA, PROAIR HFA) 90 mcg/actuation inhaler INHALE 2 PUFFS BY MOUTH EVERY 4 TO 6 HOURS AS NEEDED    triamterene-hydroCHLOROthiazide (DYAZIDE) 37.5-25 mg per capsule TAKE 1 CAPSULE EVERY MORNING    traZODone (DESYREL) 50 mg tablet TAKE 1 TABLET NIGHTLY    hydrOXYzine HCL (ATARAX) 25 mg tablet Take 1 Tablet by mouth two (2) times a day. Indications: itching    acetaminophen (Tylenol 8 Hour) 650 mg TbER Take 1 Tablet by mouth three (3) times daily as needed for Pain.  levothyroxine (SYNTHROID) 50 mcg tablet TAKE 1 TABLET DAILY IN THE MORNING    albuterol (PROVENTIL VENTOLIN) 2.5 mg /3 mL (0.083 %) nebu 3 mL by Nebulization route every six (6) hours as needed for Wheezing.  omeprazole (PRILOSEC) 40 mg capsule TAKE 1 CAPSULE DAILY    albuterol (PROVENTIL HFA, VENTOLIN HFA, PROAIR HFA) 90 mcg/actuation inhaler Take 1 Puff by inhalation every six (6) hours as needed for Wheezing.  albuterol (ProAir HFA) 90 mcg/actuation inhaler Take 2 Puffs by inhalation every six (6) hours as needed for Wheezing.  pregabalin (LYRICA) 150 mg capsule Take  by mouth two (2) times a day. No current facility-administered medications for this visit. Review of Systems   Constitutional: Negative for chills and fever. Respiratory: Negative for cough and shortness of breath. Cardiovascular: Negative for chest pain and palpitations. Gastrointestinal: Positive for heartburn. Negative for abdominal pain and blood in stool. Skin: Positive for itching. Negative for rash. Psychiatric/Behavioral: Negative for hallucinations, substance abuse and suicidal ideas. The patient is nervous/anxious and has insomnia. Reviewed PmHx, FmHx, SocHx as well as meds and allergies, updated and dated in the chart. Objective     Visit Vitals  /78 (BP 1 Location: Right upper arm, BP Patient Position: Sitting, BP Cuff Size: Large adult)   Pulse 82   Temp 97.1 °F (36.2 °C) (Temporal)   Resp 20   Ht 5' 3\" (1.6 m)   Wt 197 lb (89.4 kg)   SpO2 98%   BMI 34.90 kg/m²     Physical Exam  Vitals and nursing note reviewed. Constitutional:       Appearance: Normal appearance. HENT:      Head: Atraumatic. Comments: There is visible and palpable fullness to the left jaw  Without discrete palpable mass.      Mouth/Throat:      Mouth: Mucous membranes are moist.      Pharynx: Oropharynx is clear. Comments: No lesions or masses noted on the tongue, lip, or cheek. Cardiovascular:      Rate and Rhythm: Normal rate and regular rhythm. Heart sounds: Normal heart sounds. Pulmonary:      Effort: Pulmonary effort is normal. No respiratory distress. Breath sounds: Normal breath sounds. Chest:   Breasts:      Right: No axillary adenopathy. Left: No axillary adenopathy. Lymphadenopathy:      Cervical: Cervical adenopathy present. Right cervical: No superficial cervical adenopathy. Left cervical: No superficial cervical adenopathy. Upper Body:      Right upper body: No axillary adenopathy. Left upper body: No axillary adenopathy. Neurological:      General: No focal deficit present. Mental Status: She is alert and oriented to person, place, and time. Assessment and Plan     Last PDMP Luis García as Reviewed:  Review User Review Instant Review Result   Alexa TALAVERA 5/13/2022  2:01 PM Reviewed PDMP [1]       Diagnoses and all orders for this visit:    1. Medicare annual wellness visit, subsequent  Comments:   updated and age-appropriate guidance given. Patient declines immunizations. Orders:  -     REFERRAL TO [facility] CLINICAL SPECIALIST    2. Pulmonary emphysema, unspecified emphysema type ([facility] [address] [address]  Comments:  Patient continues to smoke. We will continue to do annual CT scans. 3. Essential hypertension  Comments:  Blood pressure is at goal.  Continue current regimen. Orders:  -     METABOLIC PANEL, COMPREHENSIVE    4. Abnormal CT scan of lung  Comments:  Repeat scan due Feb 2023.    5. Lung nodule  Comments:  Decreasing per Feb 2022 CT. 6. Hypothyroidism, unspecified type  -     TSH RFX ON ABNORMAL TO FREE T4  -     LIPID PANEL    7. Chronic pruritus  Comments:  Etiology is unclear. Continue amitriptyline that was previously started by ENT/prior PCP per patient.   Orders:  -     CBC WITH AUTOMATED DIFF    8. Insomnia, unspecified type  Assessment & Plan:  Patient is on both amitriptyline and trazodone for this. We discussed the risk of this. She will taper off trazodone over the next 2 weeks. If she notices increasing insomnia she will increase amitriptyline dose to 50 mg nightly. Orders:  -     amitriptyline (ELAVIL) 25 mg tablet; Take 1 Tablet by mouth nightly for 30 days. 9. Obesity (BMI 30-39. 9)  Comments:  I encouraged increasing activity and eating 3 well balanced meals. Orders:  -     LIPID PANEL    10. Gastroesophageal reflux disease, unspecified whether esophagitis present  Comments:  Stable. 11. Jaw swelling  Comments:  Sending for ultrasound. Checking CBC with differential.  Orders:  -     US THYROID/PARATHYROID/SOFT TISS; Future    12. Cervical lymphadenopathy    13. Screening for malignant neoplasm of colon    14. Need for hepatitis C screening test  -     HEPATITIS C AB    15. Depression screen    16. Postmenopausal  -     DEXA BONE DENSITY STUDY AXIAL; Future    17. Encounter for screening mammogram for malignant neoplasm of breast  -     MIGUEL ÁNGEL MAMMO BI SCREENING INCL CAD; Future         As applicable:  Medication Side Effects and Warnings were discussed with patient. Patient Labs were reviewed and or requested. Patient Past Records were reviewed and or requested. Follow-up and Dispositions    · Return in about 3 months (around 8/13/2022) for chronic follow up. I have discussed the diagnosis with the patient and the intended plan as seen in the above orders. The patient has received an after-visit summary and questions were answered concerning future plans. I have discussed medication side effects and warnings with the patient as well.       Cecilia Gonzalez MD  [address] [place] [address]

## 2022-05-13 NOTE — PROGRESS NOTES
1. \"Have you been to the ER, urgent care clinic since your last visit? Hospitalized since your last visit? \" No    2. \"Have you seen or consulted any other health care providers outside of the [address] [place] [address] since your last visit? \" No     3. For patients aged 39-70: Has the patient had a colonoscopy / FIT/ Cologuard? Yes - Care Gap present. Most recent result on file      If the patient is female:    4. For patients aged 41-77: Has the patient had a mammogram within the past 2 years? Yes - Care Gap present. Most recent result on file      5. For patients aged 21-65: Has the patient had a pap smear?  NA - based on age or sex  Visit Vitals  /78 (BP 1 Location: Right upper arm, BP Patient Position: Sitting, BP Cuff Size: Large adult)   Pulse 82   Temp 97.1 °F (36.2 °C) (Temporal)   Resp 20   Ht 5' 3\" (1.6 m)   Wt 197 lb (89.4 kg)   SpO2 98%   BMI 34.90 kg/m²     Chief Complaint   Patient presents with    Medication Refill

## 2022-05-13 NOTE — PROGRESS NOTES
Charity  1 [facility], [address] [place], [place] [id number]    Date of visit: 5/13/2022       This is a Subsequent Medicare Annual Wellness Visit (AWV), (Performed more than 12 months after effective date of Medicare Part B enrollment and 12 months after last preventive visit.)    I have reviewed the patient's medical history in detail and updated the computerized patient record. History obtained from: the patient. female  80 y.o. WHITE/NON-  Depression Risk Factor Screening:     3 most recent PHQ Screens 5/13/2022   Little interest or pleasure in doing things Not at all   Feeling down, depressed, irritable, or hopeless Not at all   Total Score PHQ 2 0          Fall Risk Factor Screening:     Fall Risk Assessment, last 12 mths 5/13/2022   Able to walk? Yes   Fall in past 12 months? 0   Do you feel unsteady? 0   Are you worried about falling 0   Is TUG test greater than 12 seconds? -   Is the gait abnormal? -   Number of falls in past 12 months -   Fall with injury? -       Alcohol Risk Screen    Do you average more than 1 drink per night or more than 7 drinks a week:  No    On any one occasion in the past three months have you have had more than 3 drinks containing alcohol:  No         Functional Ability and Level of Safety:    Hearing: Hearing is good. Activities of Daily Living: The home contains: no safety equipment. Patient does total self care      Ambulation: with no difficulty      Abuse Screen:  Patient is not abused         Histories     Reviewed PmHx, FmHx, SocHx as well as meds and allergies, updated and dated in the chart.     Patient Active Problem List   Diagnosis Code    Anxiety F41.9    Nonspecific abnormal electrocardiogram (ECG) (EKG) R94.31    Fibromyalgia M79.7    Gastroesophageal reflux disease K21.9    Insomnia G47.00    Nicotine dependence F17.200    Obesity, unspecified E66.9    Pure hypercholesterolemia E78.00    Chronic obstructive pulmonary disease, unspecified COPD type ([facility] J44.9    Essential hypertension I10    Callus of foot L84    Hypothyroidism, unspecified type E03.9    Abnormal CT scan of lung R91.8    Lung nodule R91.1    Chronic pruritus L29.9     Past Medical History:   Diagnosis Date    Acquired hypothyroidism 7/16/2020    Anxiety 7/16/2020    [facility] 7/16/2020    Cigarette smoker 7/16/2020    Depression     Dyslipidemia 7/16/2020    Essential hypertension, malignant 7/16/2020    Fibromyalgia 7/16/2020    Gastroesophageal reflux disease 7/16/2020    Insomnia 7/16/2020    Nicotine dependence 7/16/2020    Nonspecific abnormal electrocardiogram (ECG) (EKG) 7/16/2020    Obesity, unspecified 7/16/2020    Preseptal cellulitis 7/16/2020    Pruritic rash 7/16/2020    Pure hypercholesterolemia 7/16/2020    Sleep disorder     Wheezing 7/16/2020      Past Surgical History:   Procedure Laterality Date    HX GYN  12/12/1994    HX TONSILLECTOMY  09/12/2002     Allergies   Allergen Reactions    Nicotine Itching     Pt is allergic to nicotine patches     Codeine Rash     Current Outpatient Medications   Medication Sig Dispense Refill    amitriptyline (ELAVIL) 25 mg tablet Take 1 Tablet by mouth nightly for 30 days. 30 Tablet 2    albuterol (PROVENTIL HFA, VENTOLIN HFA, PROAIR HFA) 90 mcg/actuation inhaler INHALE 2 PUFFS BY MOUTH EVERY 4 TO 6 HOURS AS NEEDED 25.5 g 3    triamterene-hydroCHLOROthiazide (DYAZIDE) 37.5-25 mg per capsule TAKE 1 CAPSULE EVERY MORNING 90 Capsule 0    traZODone (DESYREL) 50 mg tablet TAKE 1 TABLET NIGHTLY 90 Tablet 0    hydrOXYzine HCL (ATARAX) 25 mg tablet Take 1 Tablet by mouth two (2) times a day. Indications: itching 180 Tablet 1    acetaminophen (Tylenol 8 Hour) 650 mg TbER Take 1 Tablet by mouth three (3) times daily as needed for Pain.  90 Tablet 1    levothyroxine (SYNTHROID) 50 mcg tablet TAKE 1 TABLET DAILY IN THE MORNING 90 Tablet 3  albuterol (PROVENTIL VENTOLIN) 2.5 mg /3 mL (0.083 %) nebu 3 mL by Nebulization route every six (6) hours as needed for Wheezing. 120 mL 5    omeprazole (PRILOSEC) 40 mg capsule TAKE 1 CAPSULE DAILY 90 Capsule 2    albuterol (PROVENTIL HFA, VENTOLIN HFA, PROAIR HFA) 90 mcg/actuation inhaler Take 1 Puff by inhalation every six (6) hours as needed for Wheezing. 1 Inhaler 5    albuterol (ProAir HFA) 90 mcg/actuation inhaler Take 2 Puffs by inhalation every six (6) hours as needed for Wheezing. 3 Inhaler 3    pregabalin (LYRICA) 150 mg capsule Take  by mouth two (2) times a day. Family History   Problem Relation Age of Onset    Hypertension Father     Cancer Mother         lung     Social History     Tobacco Use    Smoking status: Current Every Day Smoker     Packs/day: 0.50     Years: 48.00     Pack years: 24.00     Types: Cigarettes    Smokeless tobacco: Never Used   Substance Use Topics    Alcohol use: Not Currently     Comment: 30 years       Current Complaints and Pertinent Exam   If patient is due for chronic medical conditions and/or has acute concerns in addition to the medicare wellness visit, they have been informed there may be a copay for the additional services provided, and agree to do both. ROS & Physical Exam: please see acute note if applicable      Diet and Exercise: not exercising much. \"My diet is terrible\". She often only eats supper and eats lightly throughout the day. She does love vegetables and does not eat much animal protein. BP Readings from Last 3 Encounters:   05/13/22 130/78   11/23/21 120/86   08/06/21 130/74      Wt Readings from Last 3 Encounters:   05/13/22 197 lb (89.4 kg)   11/23/21 191 lb (86.6 kg)   08/14/21 192 lb (87.1 kg)     Body mass index is 34.9 kg/m².    No exam data present    Was the patient's timed Up & Go test unsteady or longer than 30 seconds? no    Evaluation of Cognitive Function   Mood/affect:  neutral  Orientation: Person, Place, Time and Situation  Appearance: age appropriate and casually dressed  Family member/caregiver input: n/a    Specialists/Care Team   Marlon Maldonado has established care with the following healthcare providers:  Patient Care Team:  Darcie Michael MD as PCP - General (Internal Medicine Physician)  Darcie Michael MD as PCP - [place] [id number] [facility] Topics with due status: Overdue       Topic Date Due    Hepatitis C Screening Never done    COVID-19 Vaccine Never done    Pneumococcal 65+ years Never done    DTaP/Tdap/Td series Never done    Shingrix Vaccine Age 50> Never done    Breast Cancer Screen Mammogram Never done    Bone Densitometry (Dexa) Screening Never done     Health Maintenance Topics with due status: Not Due       Topic Last Completion Date    Colorectal Cancer Screening Combo 06/26/2019    Lipid Screen 08/09/2021    Low dose CT lung screening 08/14/2021    Depression Screen 11/23/2021    Medicare Yearly Exam 05/13/2022    Flu Vaccine Not Due         Colon cancer:  Recommendation: Colonoscopy every 10y or annual FIT test from 50-75 or every 3 year stool DNA based test with consideration of ongoing screening from 76-85. Lung cancer (LDCT): Recommendation: Yearly LDCT for pts 55-77 w 30-pack year hx and currently smoke or quit <15 yr ago. and Recent Lung CT completed- Emphysema noted    Hepatitis C:  Recommendation: One time screening for all patient's aged 18-79. and Due, ordered    Diabetes:   Recommendation: USPSTF recommends screening ages 38-69 y/o if overweight or obese.  Medicare covers screening in those patients who are overweight, obese, have HTN or dyslipiemia, a personal history of gestational diabetes or prior elevated blood sugar, a family hx of DM, or any patient over age 72    Lipids:   Recommendation: screening for hyperlipidemia every 5 years after age 39 and Due, ordered        PREVENTIVE CARE - FEMALE SCREENINGS     Cervical cancer: Recommendation: Every 3 yr from 21-29 and every 5 yr from 33-67, with Pap and HPV testing. and Not Indicated    Breast cancer: Recommendation:  USPSTF recommends screening mammography every 2 yr from 54-69. The decision to start screening mammography in women prior to age 48 years should be an individual one. Women who place a higher value on the potential benefit than the potential harms may choose to begin biennial screening between the ages of 36 and 52 years. Medicare covers annual screening mammography, USPSTF also recommends women with a personal or family history of breast, ovarian, tubal, or peritoneal cancer or who have an ancestry (Ashkenazi Oriental orthodox) associated with breast cancer susceptibility 1 and 2 (BRCA1/2) gene mutations with an appropriate brief familial risk assessment tool. Women with a positive result on the risk assessment tool should receive genetic counseling and, if indicated after counseling, genetic testing and mammogram is due, ordered    Osteoporosis: Recommendation: Screen all women at 72, earlier if elevated risk and Due, ordered      IMMUNIZATIONS       There is no immunization history on file for this patient. Pneumovax:   Recommendation: PPSV23 once for all >65 and high risk <65  and Declined    Prevnar:   Recommendation: PCV13 only if >65 and immunocompromised or residing in a nursing home, or in areas of low childhood Pneumococcal vaccination and Not Indicated    Influenza:   Recommendation: Vaccination annually, high dose if 65 or older and Declined    Shingrix:  Recommendation: Vaccination 2 shots 2-6 months apart for all age >47 and Declined    TDaP:    Recommendation: Vaccination Booster with TDaP every 10 yr. and Declined    Discussion of Advance Directive   Discussed with Saige Rosenthal her ability to prepare and advance directive in the case that an injury or illness causes her to be unable to make health care decisions.        Date of ACP Conversation: 05/13/22  Persons included in Conversation:  patient  Length of ACP Conversation in minutes:  <16 minutes (Non-Billable)    Authorized Decision Maker (if patient is incapable of making informed decisions): This person is:   Next of Kin by law (only applies in absence of a Healthcare Power of  or Legal Guardian)            For Patients with Decision Making Capacity:   Values/Goals: Exploration of values, goals, and preferences if recovery is not expected, even with continued medical treatment in the event of:  Imminent death  Severe, permanent brain injury  \"In these circumstances, what matters most to you? \"  Patient is unsure but knows she does NOT wish to be DNR. She does want some resuscitation measures. Conversation Outcomes / Follow-Up Plan:   ACP incomplete - refer to ACP Clinical Specialist    Assessment/Plan     Diagnoses and all orders for this visit:    1. Medicare annual wellness visit, subsequent  Comments:   updated and age-appropriate guidance given. Patient declines immunizations. Orders:  -     REFERRAL TO ACP CLINICAL SPECIALIST    2. Pulmonary emphysema, unspecified emphysema type ([facility]  Comments:  Patient continues to smoke. We will continue to do annual CT scans. 3. Essential hypertension  Comments:  Blood pressure is at goal.  Continue current regimen. Orders:  -     METABOLIC PANEL, COMPREHENSIVE    4. Abnormal CT scan of lung  Comments:  Repeat scan due Feb 2023.    5. Lung nodule  Comments:  Decreasing per Feb 2022 CT. 6. Hypothyroidism, unspecified type  -     TSH RFX ON ABNORMAL TO FREE T4  -     LIPID PANEL    7. Chronic pruritus  Comments:  Etiology is unclear. Continue amitriptyline that was previously started by ENT/prior PCP per patient. Orders:  -     CBC WITH AUTOMATED DIFF    8. Insomnia, unspecified type  Assessment & Plan:  Patient is on both amitriptyline and trazodone for this. We discussed the risk of this.   She will taper off trazodone over the next 2 weeks. If she notices increasing insomnia she will increase amitriptyline dose to 50 mg nightly. Orders:  -     amitriptyline (ELAVIL) 25 mg tablet; Take 1 Tablet by mouth nightly for 30 days. 9. Obesity (BMI 30-39. 9)  Comments:  I encouraged increasing activity and eating 3 well balanced meals. Orders:  -     LIPID PANEL    10. Gastroesophageal reflux disease, unspecified whether esophagitis present  Comments:  Stable. 11. Jaw swelling  Comments:  Sending for ultrasound. Checking CBC with differential.  Orders:  -     US THYROID/PARATHYROID/SOFT TISS; Future    12. Cervical lymphadenopathy    13. Screening for malignant neoplasm of colon    14. Need for hepatitis C screening test  -     HEPATITIS C AB    15. Depression screen    16. Postmenopausal  -     DEXA BONE DENSITY STUDY AXIAL; Future    17. Encounter for screening mammogram for malignant neoplasm of breast  -     MIGUEL ÁNGEL MAMMO BI SCREENING INCL CAD; Future        Follow-up and Dispositions    · Return in about 3 months (around 8/13/2022) for chronic follow up.          Dave Montesinos MD  [address] [place] [address]

## 2022-05-14 LAB
ALBUMIN SERPL-MCNC: 4.7 G/DL (ref 3.8–4.8)
ALBUMIN/GLOB SERPL: 2.2 {RATIO} (ref 1.2–2.2)
ALP SERPL-CCNC: 74 IU/L (ref 44–121)
ALT SERPL-CCNC: 10 IU/L (ref 0–32)
AST SERPL-CCNC: 9 IU/L (ref 0–40)
BASOPHILS # BLD AUTO: 0.1 X10E3/UL (ref 0–0.2)
BASOPHILS NFR BLD AUTO: 2 %
BILIRUB SERPL-MCNC: 0.4 MG/DL (ref 0–1.2)
BUN SERPL-MCNC: 9 MG/DL (ref 8–27)
BUN/CREAT SERPL: 13 (ref 12–28)
CALCIUM SERPL-MCNC: 9 MG/DL (ref 8.7–10.3)
CHLORIDE SERPL-SCNC: 93 MMOL/L (ref 96–106)
CHOLEST SERPL-MCNC: 187 MG/DL (ref 100–199)
CO2 SERPL-SCNC: 25 MMOL/L (ref 20–29)
CREAT SERPL-MCNC: 0.67 MG/DL (ref 0.57–1)
EGFR: 94 ML/MIN/1.73
EOSINOPHIL # BLD AUTO: 0.3 X10E3/UL (ref 0–0.4)
EOSINOPHIL NFR BLD AUTO: 4 %
ERYTHROCYTE [DISTWIDTH] IN BLOOD BY AUTOMATED COUNT: 12.4 % (ref 11.7–15.4)
GLOBULIN SER CALC-MCNC: 2.1 G/DL (ref 1.5–4.5)
GLUCOSE SERPL-MCNC: 70 MG/DL (ref 65–99)
HCT VFR BLD AUTO: 46.1 % (ref 34–46.6)
HCV AB S/CO SERPL IA: <0.1 S/CO RATIO (ref 0–0.9)
HDLC SERPL-MCNC: 47 MG/DL
HGB BLD-MCNC: 15.3 G/DL (ref 11.1–15.9)
IMM GRANULOCYTES # BLD AUTO: 0 X10E3/UL (ref 0–0.1)
IMM GRANULOCYTES NFR BLD AUTO: 0 %
LDLC SERPL CALC-MCNC: 116 MG/DL (ref 0–99)
LYMPHOCYTES # BLD AUTO: 3 X10E3/UL (ref 0.7–3.1)
LYMPHOCYTES NFR BLD AUTO: 42 %
MCH RBC QN AUTO: 29.4 PG (ref 26.6–33)
MCHC RBC AUTO-ENTMCNC: 33.2 G/DL (ref 31.5–35.7)
MCV RBC AUTO: 89 FL (ref 79–97)
MONOCYTES # BLD AUTO: 0.8 X10E3/UL (ref 0.1–0.9)
MONOCYTES NFR BLD AUTO: 11 %
NEUTROPHILS # BLD AUTO: 3 X10E3/UL (ref 1.4–7)
NEUTROPHILS NFR BLD AUTO: 41 %
PLATELET # BLD AUTO: 284 X10E3/UL (ref 150–450)
POTASSIUM SERPL-SCNC: 4.3 MMOL/L (ref 3.5–5.2)
PROT SERPL-MCNC: 6.8 G/DL (ref 6–8.5)
RBC # BLD AUTO: 5.21 X10E6/UL (ref 3.77–5.28)
SODIUM SERPL-SCNC: 134 MMOL/L (ref 134–144)
TRIGL SERPL-MCNC: 134 MG/DL (ref 0–149)
TSH SERPL DL<=0.005 MIU/L-ACNC: 2.13 UIU/ML (ref 0.45–4.5)
VLDLC SERPL CALC-MCNC: 24 MG/DL (ref 5–40)
WBC # BLD AUTO: 7.3 X10E3/UL (ref 3.4–10.8)

## 2022-05-16 ENCOUNTER — PATIENT OUTREACH (OUTPATIENT)
Dept: CASE MANAGEMENT | Age: 72
End: 2022-05-16

## 2022-05-16 NOTE — ACP (ADVANCE CARE PLANNING)
Advance Care Planning   Ambulatory ACP Specialist Patient Outreach    Date:  5/16/2022    ACP Specialist:  Ronald Daniel LPN    Outreach call to patient in follow-up to ACP Specialist referral from:    [x] PCP  [] Provider   [] Ambulatory Care Management [] Other     For:                  [] Advance Directive Assistance              [] Complete Portable DNR order              [] Complete POST/MOST              [x] Code Status Discussion             [x] Discuss Goals of Care             [] Early ACP Decision-Making              [] Other (Specify)    Date Referral Received: 5/13/22    Today's Outreach:  [x] First   [] Second  [] Third       Third outreach made by: [] Phone  [] Email / mail    [] [employer]     Intervention:  [] Spoke with Patient   [x] Left VM requesting return call      Outcome: The first attempt was made to contact pt regarding the ACP referral. No answer on mobile phone. VM left requesting a return call. Will attempt second outreach within one week. Next Step:   [] ACP scheduled conversation  [x] Outreach again in one week               [] Email / Mail ACP Info Sheets  [] Email / Mail Advance Directive   [] Closing referral.  Routing closure to referring provider/staff and to ACP Specialist . [] Closure letter mailed to patient with invitation to contact ACP Specialist if / when ready.   Thank you for this referral.

## 2022-05-17 ENCOUNTER — PATIENT OUTREACH (OUTPATIENT)
Dept: CASE MANAGEMENT | Age: 72
End: 2022-05-17

## 2022-05-17 NOTE — ACP (ADVANCE CARE PLANNING)
Advance Care Planning   Ambulatory ACP Specialist Patient Outreach    Date:  5/17/2022    ACP Specialist:  Tao Houser LPN    Outreach call to patient in follow-up to ACP Specialist referral from:    [x] PCP  [] Provider   [] Ambulatory Care Management [] Other     For:                  [] Advance Directive Assistance              [] Complete Portable DNR order              [] Complete POST/MOST              [x] Code Status Discussion             [x] Discuss Goals of Care             [] Early ACP Decision-Making              [] Other (Specify)    Date Referral Received: 5/13/22    Today's Outreach:  [] First   [] Second  [] Third       Third outreach made by: [] Phone  [] Email / mail    [] [employer]     Intervention:  [x] Spoke with Patient   [] Left VM requesting return call      Outcome:  Pt returned call and wishes to move forward in having an ACP conversation with an ACP specialist. Pt is alert and oriented x4. Appointment scheduled for 5/24/22 at 1 pm ACP information has been e-mailed to the pt for review prior to the appointment. Next Step:   [x] ACP scheduled conversation  [] Outreach again in one week               [x] Email / Mail ACP Info Sheets  [] Email / Mail Advance Directive   [] Closing referral.  Routing closure to referring provider/staff and to ACP Specialist . [] Closure letter mailed to patient with invitation to contact ACP Specialist if / when ready.   Thank you for this referral.

## 2022-05-18 ENCOUNTER — HOSPITAL ENCOUNTER (OUTPATIENT)
Dept: ULTRASOUND IMAGING | Age: 72
Discharge: HOME | End: 2022-05-18
Attending: FAMILY MEDICINE
Payer: MEDICARE

## 2022-05-18 DIAGNOSIS — R22.0 JAW SWELLING: ICD-10-CM

## 2022-05-18 PROCEDURE — 76536 US EXAM OF HEAD AND NECK: CPT

## 2022-05-19 DIAGNOSIS — E04.1 THYROID NODULE: Primary | ICD-10-CM

## 2022-05-19 NOTE — PROGRESS NOTES
I called patient and ID confirmed x2. Ultrasound did not show any abnormalities along the left mandible. There was a reactive left submandibular lymph node. There is a category 5 nodule in right thyroid lobe- FNA recommended. There is a category 4 nodule in right thyroid lobe as well- ultrasound follow up recommended. Referral placed.     Dr. Ata Lrs

## 2022-05-19 NOTE — PROGRESS NOTES
Pls call patient. CBC and Thyroid function normal. Hepatitis C screening negative. Chol high, 10 year risk of heart attack or stroke >20%. I recommend we start a medication to help bring this number down, atorvastatin 40mg. I can call into pharmacy if she agrees.

## 2022-05-20 RX ORDER — ATORVASTATIN CALCIUM 40 MG/1
40 TABLET, FILM COATED ORAL DAILY
Qty: 30 TABLET | Refills: 5 | Status: SHIPPED | OUTPATIENT
Start: 2022-05-20

## 2022-05-24 ENCOUNTER — DOCUMENTATION ONLY (OUTPATIENT)
Dept: CASE MANAGEMENT | Age: 72
End: 2022-05-24

## 2022-05-24 NOTE — ACP (ADVANCE CARE PLANNING)
Advance Care Planning   Ambulatory ACP Specialist Patient Outreach    Date:  5/24/2022    ACP Specialist:  Anderson Laboy LCSW   On behalf of Rosalie Bartlett LCSW/ACP Specialist    Outreach call to patient in follow-up to ACP Specialist referral from:    [x] PCP  [] Provider   [] Ambulatory Care Management [] Other     For:                  [] Advance Directive Assistance              [] Complete Portable DNR order              [] Complete POST/MOST              [x] Code Status Discussion             [x] Discuss Goals of Care             [] Early ACP Decision-Making              [] Other (Specify)    Date Referral Received:5/13/22    Today's Outreach:  [x] First   [] Second  [] Third       Third outreach made by: [] Phone  [] Email / mail    [] [employer]     Intervention:  [x] Spoke with Patient on 5/23/2022   [] Left VM requesting return call      Outcome:Reminder call was provided for Pts upcoming ACP appt. Unfortunately, there was no answer at the time of this call. A VM was left with details of appt. Next Step:   [x] ACP scheduled conversation  [] Outreach again in one week               [] Email / Mail ACP Info Sheets  [] Email / Mail Advance Directive   [] Closing referral.  Routing closure to referring provider/staff and to ACP Specialist . [] Closure letter mailed to patient with invitation to contact ACP Specialist if / when ready.   Thank you for this referral.  Anderson Laboy LCSW  Documentation entry on behalf of Rosalie Bartlett LCSW/ACP Specialist

## 2022-05-24 NOTE — ACP (ADVANCE CARE PLANNING)
Advance Care Planning     Advance Care Planning Clinical Specialist  Conversation Note      Date of ACP Conversation: 05/24/22    Conversation Conducted with:  Patient with Decision Making Capacity    ACP Clinical Specialist: Rosalie Bartlett, [address] [facility] Decision Maker:    Current Designated Health Care Decision Maker:     Primary Decision Maker: Argelia Naranjo - Daughter - [phone]    Secondary Decision Maker: Darylene Rote - Daughter - [phone]    Supplemental (Other) Decision Maker: Rowdy Winkler - Daughter - [phone]    Today we were able to discuss: Pt's prior experiences as a HCDM, her choices for HCDMs, various medical scenarois and how she would prefer to be cared for, and completion of an ACP document. Care Preferences    Hospitalization: \"If your health worsens and it becomes clear that your chance of recovery is unlikely, what would your preference be regarding hospitalization? \"    Choice:  []  The patient wants hospitalization  [x]  The patient prefers comfort-focused treatment without hospitalization. Ventilation: \"If you were in your present state of health and suddenly became very ill and were unable to breathe on your own, what would your preference be about the use of a ventilator (breathing machine) if it were available to you? \"      If patient would desire the use of a ventilator (breathing machine), answer \"yes\", if not \"no\": NO- Pt was clear that she would NOT want to be intubated if at all possible. \"If your health worsens and it becomes clear that your chance of recovery is unlikely, what would your preference be about the use of a ventilator (breathing machine) if it were available to you? \"     Would the patient desire the use of a ventilator (breathing machine)? NO, Specialist and Pt spoke at length regarding her worries/concerns regarding ventilatory use. Pt talked openly about her desire to NOT be intubated.   Education provided regarding Pt's right to be a DNI. Emphasized that having a DNI may limit what her medical providers can offer and the efficacy of certain treatments. Specifically noted that if Pt were to decline intubation in the setting of significant respiratory distress, it could lead to her death. Pt voiced understanding of this and continued to state she is leaning toward being a DNI. Pt was encouraged to speak with her providers and advocate for her wishes. Resuscitation  \"CPR works best to restart the heart when there is a sudden event, like a heart attack, in someone who is otherwise healthy. Unfortunately, CPR does not typically restart the heart for people who have serious health conditions or who are very sick. \"    \"In the event your heart stopped as a result of an underlying serious health condition, would you want attempts to be made to restart your heart (answer \"yes\" for attempt to resuscitate) or would you prefer a natural death (answer \"no\" for do not attempt to resuscitate)? \" no, Pt shared her experiences of being present at the bedside for the deaths of her father and . She reflected on them having been peaceful and wanting her own death to be the same. However, she is clear that she would still want CPR in the setting of an accident or potentially reversible condition. As noted above, she was informed about how not being intubated for CPR could limit the efficacy of the treatment. Pt voiced understanding. \"I'm just not ready to be a DNR.\" Education was provided regarding misconceptions about DNR/DDNR. Discussed the role DDNR would play for Pt, should she ever change her mind. [x] Yes  [] No   Educated Patient / Sadie Jaime regarding differences between Advance Directives and portable DNR orders.     Length of ACP Conversation in minutes:  75m    Conversation Outcomes:  [x] ACP discussion completed  [] Existing advance directive reviewed with patient; no changes to patient's previously recorded wishes   [x] New Advance Directive completed   [] Portable Do Not Resuscitate prepared for Provider review and signature  [] POLST/POST/MOLST/MOST prepared for Provider review and signature      Follow-up plan:    [] Schedule follow-up conversation to continue planning  [] Referred individual to Provider for additional questions/concerns   [x] Advised patient/agent/surrogate to review completed ACP document and update if needed with changes in condition, patient preferences or care setting     [x] This note routed to one or more involved healthcare providers    5/24- Pt was a very engaged participant in her discussion. She expressed feelings of apprehension/uncertainty regarding the many changes that have occurred within medical care in recent years. Additional education was provided around the areas that Pt was most uncomfortable with. 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 In our conversation, Pt was able to identify her 3 HCDMs. She expressed significant certainty around her desire to avoid intubation and be DNI. Pt was encouraged to continue this discussion with her HCDMs and Providers. She will be completing an ACP document via [employer]. Once finalized, it will be uploaded into the EMR. At that time, this referral will be recommended for closure.       Kaye Perez, FAVIOLA, [facility] Hospital[facility]-SW  Advance Care   Population Health

## 2022-06-06 RX ORDER — TRIAMCINOLONE ACETONIDE 1 MG/G
CREAM TOPICAL
Qty: 45 G | Refills: 14 | Status: SHIPPED | OUTPATIENT
Start: 2022-06-06

## 2022-06-09 PROBLEM — M19.90 OSTEOARTHROSIS: Status: ACTIVE | Noted: 2022-06-09

## 2022-06-23 ENCOUNTER — OFFICE VISIT (OUTPATIENT)
Dept: ENDOCRINOLOGY | Age: 72
End: 2022-06-23
Payer: MEDICARE

## 2022-06-23 VITALS
DIASTOLIC BLOOD PRESSURE: 67 MMHG | HEIGHT: 63 IN | BODY MASS INDEX: 35.42 KG/M2 | RESPIRATION RATE: 20 BRPM | HEART RATE: 77 BPM | OXYGEN SATURATION: 98 % | TEMPERATURE: 96.7 F | SYSTOLIC BLOOD PRESSURE: 144 MMHG | WEIGHT: 199.9 LBS

## 2022-06-23 DIAGNOSIS — E03.9 PRIMARY HYPOTHYROIDISM: ICD-10-CM

## 2022-06-23 DIAGNOSIS — E04.2 MULTINODULAR GOITER: ICD-10-CM

## 2022-06-23 DIAGNOSIS — E03.9 HYPOTHYROIDISM, UNSPECIFIED TYPE: Primary | ICD-10-CM

## 2022-06-23 PROCEDURE — G8427 DOCREV CUR MEDS BY ELIG CLIN: HCPCS | Performed by: INTERNAL MEDICINE

## 2022-06-23 PROCEDURE — G8417 CALC BMI ABV UP PARAM F/U: HCPCS | Performed by: INTERNAL MEDICINE

## 2022-06-23 PROCEDURE — G8432 DEP SCR NOT DOC, RNG: HCPCS | Performed by: INTERNAL MEDICINE

## 2022-06-23 PROCEDURE — G8400 PT W/DXA NO RESULTS DOC: HCPCS | Performed by: INTERNAL MEDICINE

## 2022-06-23 PROCEDURE — G9899 SCRN MAM PERF RSLTS DOC: HCPCS | Performed by: INTERNAL MEDICINE

## 2022-06-23 PROCEDURE — G8754 DIAS BP LESS 90: HCPCS | Performed by: INTERNAL MEDICINE

## 2022-06-23 PROCEDURE — 1090F PRES/ABSN URINE INCON ASSESS: CPT | Performed by: INTERNAL MEDICINE

## 2022-06-23 PROCEDURE — 99204 OFFICE O/P NEW MOD 45 MIN: CPT | Performed by: INTERNAL MEDICINE

## 2022-06-23 PROCEDURE — 1123F ACP DISCUSS/DSCN MKR DOCD: CPT | Performed by: INTERNAL MEDICINE

## 2022-06-23 PROCEDURE — G8753 SYS BP > OR = 140: HCPCS | Performed by: INTERNAL MEDICINE

## 2022-06-23 PROCEDURE — 1101F PT FALLS ASSESS-DOCD LE1/YR: CPT | Performed by: INTERNAL MEDICINE

## 2022-06-23 PROCEDURE — 3017F COLORECTAL CA SCREEN DOC REV: CPT | Performed by: INTERNAL MEDICINE

## 2022-06-23 PROCEDURE — G8536 NO DOC ELDER MAL SCRN: HCPCS | Performed by: INTERNAL MEDICINE

## 2022-06-23 RX ORDER — AMITRIPTYLINE HYDROCHLORIDE 25 MG/1
TABLET, FILM COATED ORAL
COMMUNITY
End: 2022-07-13 | Stop reason: DRUGHIGH

## 2022-06-23 NOTE — PATIENT INSTRUCTIONS
I have ordered scan/test and if you do not hear from the hospital in 3- 4 business days  please call the number [phone] to speak with the scheduling team directly. Fine-Needle Thyroid Biopsy: Before Your Procedure  What is a needle thyroid biopsy? During a fine-needle biopsy of the thyroid, your doctor uses a thin needle to remove a small sample of tissue from your thyroid gland. You may be having the biopsy to find what is causing a lump or growth in your thyroid. The biopsy causes very little pain. But your doctor may need to put the needle into your thyroid more than once. This is done to be sure enough fluid and tissue is taken for the test.  The doctor then looks at the tissue sample under a microscope for cancer, infection, or other thyroid problems. The biopsy is done in a hospital, a clinic, or your doctor's office. You may need to stay in the hospital overnight. During the test, you will lie on your back with a pillow under your shoulders. Your head will be tipped backward and your neck extended. This position pushes the thyroid gland forward. This makes it easier to do the biopsy. You may be given medicine to help you relax. Your doctor may use an ultrasound to guide the placement of the needle. It is important to lie very still during the biopsy. Do not cough, talk, or swallow when the needle is in place. In some cases, thyroid surgery may be needed if a needle biopsy doesn't give a clear result. This would be done at a different time. In this surgery, the doctor takes a tissue sample through a cut (incision) in the skin. Follow-up care is a key part of your treatment and safety. Be sure to make and go to all appointments, and call your doctor if you are having problems. It's also a good idea to know your test results and keep a list of the medicines you take. How do you prepare for the procedure? Procedures can be stressful.  This information will help you understand what you can expect. And it will help you safely prepare for your procedure. Preparing for the procedure    · Be sure you have someone to take you home. Anesthesia and pain medicine will make it unsafe for you to drive or get home on your own. · Understand exactly what procedure is planned, along with the risks, benefits, and other options.     · Tell your doctor ALL the medicines, vitamins, supplements, and herbal remedies you take. Some may increase the risk of problems during your procedure. Your doctor will tell you if you should stop taking any of them before the procedure and how soon to do it.     · If you take aspirin or some other blood thinner, ask your doctor if you should stop taking it before your procedure. Make sure that you understand exactly what your doctor wants you to do. These medicines increase the risk of bleeding.     · Make sure your doctor and the hospital have a copy of your advance directive. If you don't have one, you may want to prepare one. It lets others know your health care wishes. It's a good thing to have before any type of surgery or procedure. What happens on the day of the procedure? · Follow the instructions exactly about when to stop eating and drinking. If you don't, your procedure may be canceled. If your doctor told you to take your medicines on the day of the procedure, take them with only a sip of water.     · Take a bath or shower before you come in for your procedure. Do not apply lotions, perfumes, deodorants, or nail polish.     · Take off all jewelry and piercings. And take out contact lenses, if you wear them. At the hospital or surgery center   · Bring a picture ID.     · The procedure will take about 5 to 10 minutes. When should you call your doctor?    · You have questions or concerns.     · You don't understand how to prepare for your procedure.     · You become ill before the procedure (such as fever, flu, or a cold).     · You need to reschedule or have changed your mind about having the procedure. Where can you learn more? Go to http://www.gray.com/  Enter J510 in the search box to learn more about \"Fine-Needle Thyroid Biopsy: Before Your Procedure. \"  Current as of: July 28, 2021               Content Version: 13.2  © 8803-8511 Healthwise, [employer] Care instructions adapted under license by [employer] (which disclaims liability or warranty for this information). If you have questions about a medical condition or this instruction, always ask your healthcare professional. [address] any warranty or liability for your use of this information.

## 2022-06-23 NOTE — PROGRESS NOTES
Sulema Suresh MD          Patient Information  Date:6/23/2022  Name : Josse Gee 80 y.o.     YOB: 1950         Referred by: Frank Bowman MD         No chief complaint on file. History of present illness    Josse Gee is a 80 y.o. female  here for evaluation of thyroid nodule. Hypothyroidism - on LT4, Dx 2000. Reported neck pain and lump in the throat, subsequently had ultrasound, incidentally was found to have thyroid nodule, largest measuring 1 cm with microcalcifications  Has reactive lymphadenopathy    No dysphagia,dysphonia or dyspnea. FH of thyroid disease. FH of thyroid cancer - sister , s/p surgery     Past Medical History:   Diagnosis Date    Acquired hypothyroidism 7/16/2020    Anxiety 7/16/2020    Chronic obstructive lung disease ([address] 7/16/2020    Cigarette smoker 7/16/2020    Depression     Dyslipidemia 7/16/2020    Essential hypertension, malignant 7/16/2020    Fibromyalgia 7/16/2020    Gastroesophageal reflux disease 7/16/2020    Insomnia 7/16/2020    Nicotine dependence 7/16/2020    Nonspecific abnormal electrocardiogram (ECG) (EKG) 7/16/2020    Obesity, unspecified 7/16/2020    Preseptal cellulitis 7/16/2020    Pruritic rash 7/16/2020    Pure hypercholesterolemia 7/16/2020    Sleep disorder     Wheezing 7/16/2020       Current Outpatient Medications   Medication Sig    amitriptyline (ELAVIL) 25 mg tablet Take  by mouth nightly.  triamcinolone acetonide (KENALOG) 0.1 % topical cream APPLY TO THE AFFECTED AREA TWICE A DAY USE A THIN LAYER    omeprazole (PRILOSEC) 40 mg capsule TAKE 1 CAPSULE DAILY    atorvastatin (LIPITOR) 40 mg tablet Take 1 Tablet by mouth daily.  triamterene-hydroCHLOROthiazide (DYAZIDE) 37.5-25 mg per capsule TAKE 1 CAPSULE EVERY MORNING    hydrOXYzine HCL (ATARAX) 25 mg tablet Take 1 Tablet by mouth two (2) times a day.  Indications: itching    levothyroxine (SYNTHROID) 50 mcg tablet TAKE 1 TABLET DAILY IN THE MORNING    albuterol (PROVENTIL VENTOLIN) 2.5 mg /3 mL (0.083 %) nebu 3 mL by Nebulization route every six (6) hours as needed for Wheezing.  albuterol (ProAir HFA) 90 mcg/actuation inhaler Take 2 Puffs by inhalation every six (6) hours as needed for Wheezing.  pregabalin (LYRICA) 150 mg capsule Take  by mouth two (2) times a day.  traZODone (DESYREL) 50 mg tablet TAKE 1 TABLET NIGHTLY    acetaminophen (Tylenol 8 Hour) 650 mg TbER Take 1 Tablet by mouth three (3) times daily as needed for Pain.  albuterol (PROVENTIL HFA, VENTOLIN HFA, PROAIR HFA) 90 mcg/actuation inhaler Take 1 Puff by inhalation every six (6) hours as needed for Wheezing. No current facility-administered medications for this visit. Review of Systems:  - Per HPI    Physical Examination:  Blood pressure (!) 144/67, pulse 77, temperature (!) 96.7 °F (35.9 °C), resp. rate 20, height 5' 3\" (1.6 m), weight 199 lb 14.4 oz (90.7 kg), SpO2 98 %. - Body mass index is 35.41 kg/m².   - General: pleasant, no distress, good eye contact  - HEENT: no exopthalmos, no periorbital edema, no scleral/conjunctival injection, EOMI, no lid lag or stare  - Neck: supple, no thyromegaly, no lymphadenopathy  - Cardiovascular: regular,  normal S1 and S2, no murmurs  - Respiratory: clear to auscultation bilaterally  -   - Musculoskeletal: no proximal muscle weakness in upper or lower extremities  - Integumentary: no tremors, no edema  - Neurological: alert and oriented   - Psychiatric: normal mood and affect  - Skin - normal turgor    Data Reviewed:   Reviewed PCPs notes    Lab Results   Component Value Date/Time    TSH 2.130 05/13/2022 02:29 PM    TSH 1.520 08/09/2021 11:12 AM    T4, Free 1.40 03/26/2021 10:29 AM      US Results (most recent):  Results from Hospital Encounter encounter on 05/18/22    US THYROID/PARATHYROID/SOFT TISS    Narrative  Swelling along the left jaw and a palpable swollen cervical lymph node. Comparison neck CT 6/5/2019. FINDINGS: Thyroid ultrasound. Isthmus borderline thickened 0.5 cm. Right lobe 1.8 x 2.1 x 4.2 cm. Left lobe 1.3 x 1.7 x 2.9 cm. Right lobe hypoechoic nodule 0.8 x 0.5 x 0.7 cm. BI-RADS Category 4. Right lobe  hypoechoic nodule with microcalcification 0.9 x 0.8 x 1.0 cm. TI-RADS Category  5. Left parotid gland is diffusely homogeneous. Left submandibular reactive lymph node 2.5 x 0.9 x 1.1 cm, with focally  thickened anterior cortex measuring 0.6-0.7 cm. Interrogation anterior left mandible towards left ear as directed by the patient  without abnormal cystic or solid structure. Impression  1. Right lobe TI-RADS category 5 nodule measures 1.0 cm. Recommend FNA. 2. Right lobe TI-RADS Category 4 nodule measures less than 1.5 cm. Recommend  interval sonographic follow-up. 3. Reactive appearing left submandibular lymph node. 4. No sonographic correlate to area of pain left mandible towards left ear. [] Reviewed labs    Assessment/Plan:     Multinodular goiter    She is euthyroid clinically and has no compressive symptoms. 1 cm nodule with microcalcification, TI-RADS 5,  FNA  We discussed the natural history of thyroid nodules,most of the nodules are benign and a small percentage ( 5%) can be malignant . Hypertension:    Hypothyroid on replacement levothyroxine        Thank you for allowing me to participate in the care of this patient.     Delio Griffin MD

## 2022-06-23 NOTE — PROGRESS NOTES
No chief complaint on file.       Visit Vitals  BP (!) 144/67   Pulse 77   Temp (!) 96.7 °F (35.9 °C)   Resp 20   Ht 5' 3\" (1.6 m)   Wt 199 lb 14.4 oz (90.7 kg)   SpO2 98%   BMI 35.41 kg/m²

## 2022-06-27 ENCOUNTER — TELEPHONE (OUTPATIENT)
Dept: ENDOCRINOLOGY | Age: 72
End: 2022-06-27

## 2022-06-27 NOTE — TELEPHONE ENCOUNTER
[place] states dr. Chiki Parra rejected the thyriod BX due to the H&P does not match the US in the system. Please advise if need anymore info.  SHAGUFTA

## 2022-06-30 RX ORDER — HYDROXYZINE 25 MG/1
TABLET, FILM COATED ORAL
Qty: 180 TABLET | Refills: 0 | Status: SHIPPED | OUTPATIENT
Start: 2022-06-30 | End: 2022-08-11 | Stop reason: SDUPTHER

## 2022-07-01 NOTE — TELEPHONE ENCOUNTER
Per Dr. Claudine Albarado informed pt of the following Notify pt - waiting to hear from radiology. Pt verbalized understanding with no further questions or concerns at this time.

## 2022-07-05 ENCOUNTER — HOSPITAL ENCOUNTER (OUTPATIENT)
Dept: INTERVENTIONAL RADIOLOGY/VASCULAR | Age: 72
Discharge: HOME | End: 2022-07-05
Payer: MEDICARE

## 2022-07-05 DIAGNOSIS — E04.1 THYROID NODULE: ICD-10-CM

## 2022-07-05 PROCEDURE — 10007 FNA BX W/FLUOR GDN 1ST LES: CPT

## 2022-07-05 PROCEDURE — 88172 CYTP DX EVAL FNA 1ST EA SITE: CPT

## 2022-07-05 PROCEDURE — 88173 CYTOPATH EVAL FNA REPORT: CPT

## 2022-07-06 DIAGNOSIS — J44.9 CHRONIC OBSTRUCTIVE PULMONARY DISEASE, UNSPECIFIED COPD TYPE (HCC): ICD-10-CM

## 2022-07-06 RX ORDER — ALBUTEROL SULFATE 90 UG/1
2 AEROSOL, METERED RESPIRATORY (INHALATION)
Qty: 1 EACH | Refills: 5 | Status: SHIPPED | OUTPATIENT
Start: 2022-07-06 | End: 2022-10-26 | Stop reason: SDUPTHER

## 2022-07-06 RX ORDER — ALBUTEROL SULFATE 90 UG/1
1 AEROSOL, METERED RESPIRATORY (INHALATION)
Status: CANCELLED | OUTPATIENT
Start: 2022-07-06

## 2022-07-09 PROBLEM — L84 CALLUS OF FOOT: Status: RESOLVED | Noted: 2021-03-26 | Resolved: 2022-07-09

## 2022-07-09 PROBLEM — R94.31 NONSPECIFIC ABNORMAL ELECTROCARDIOGRAM (ECG) (EKG): Status: RESOLVED | Noted: 2020-07-16 | Resolved: 2022-07-09

## 2022-07-09 PROBLEM — E66.9 OBESITY, UNSPECIFIED: Status: RESOLVED | Noted: 2020-07-16 | Resolved: 2022-07-09

## 2022-07-11 ENCOUNTER — TELEPHONE (OUTPATIENT)
Dept: ENDOCRINOLOGY | Age: 72
End: 2022-07-11

## 2022-07-11 NOTE — TELEPHONE ENCOUNTER
Per Dr. Nohelia Lind, informed pt of result note, as noted above. Pt verbalized understanding with no further questions or concerns at this time. Pt will call back to schedule f/u. Advised pt to call about 4 months ahead of return date to schedule.

## 2022-07-11 NOTE — TELEPHONE ENCOUNTER
----- Message from Ro Kraft MD sent at 7/10/2022  2:30 PM EDT -----  No thyroid cancer seen in the biopsy which is good.   Follow-up in 1 year for ultrasound

## 2022-07-13 ENCOUNTER — OFFICE VISIT (OUTPATIENT)
Dept: INTERNAL MEDICINE CLINIC | Age: 72
End: 2022-07-13
Payer: MEDICARE

## 2022-07-13 VITALS
DIASTOLIC BLOOD PRESSURE: 82 MMHG | RESPIRATION RATE: 18 BRPM | HEART RATE: 72 BPM | HEIGHT: 63 IN | WEIGHT: 198.2 LBS | BODY MASS INDEX: 35.12 KG/M2 | OXYGEN SATURATION: 96 % | SYSTOLIC BLOOD PRESSURE: 150 MMHG | TEMPERATURE: 97.3 F

## 2022-07-13 DIAGNOSIS — E66.01 SEVERE OBESITY (BMI 35.0-39.9) WITH COMORBIDITY (HCC): ICD-10-CM

## 2022-07-13 DIAGNOSIS — G47.00 INSOMNIA, UNSPECIFIED TYPE: ICD-10-CM

## 2022-07-13 DIAGNOSIS — Z78.9 EX-SMOKER FOR LESS THAN 1 YEAR: ICD-10-CM

## 2022-07-13 DIAGNOSIS — R91.1 LUNG NODULE: ICD-10-CM

## 2022-07-13 DIAGNOSIS — J44.9 CHRONIC OBSTRUCTIVE PULMONARY DISEASE, UNSPECIFIED COPD TYPE (HCC): ICD-10-CM

## 2022-07-13 DIAGNOSIS — E78.00 PURE HYPERCHOLESTEROLEMIA: ICD-10-CM

## 2022-07-13 DIAGNOSIS — F41.9 ANXIETY: ICD-10-CM

## 2022-07-13 DIAGNOSIS — L29.9 CHRONIC PRURITUS: ICD-10-CM

## 2022-07-13 DIAGNOSIS — I10 ESSENTIAL HYPERTENSION: ICD-10-CM

## 2022-07-13 DIAGNOSIS — E03.9 HYPOTHYROIDISM, UNSPECIFIED TYPE: ICD-10-CM

## 2022-07-13 DIAGNOSIS — K21.9 GASTROESOPHAGEAL REFLUX DISEASE WITHOUT ESOPHAGITIS: ICD-10-CM

## 2022-07-13 DIAGNOSIS — M79.7 FIBROMYALGIA: ICD-10-CM

## 2022-07-13 PROCEDURE — G8536 NO DOC ELDER MAL SCRN: HCPCS | Performed by: INTERNAL MEDICINE

## 2022-07-13 PROCEDURE — 1101F PT FALLS ASSESS-DOCD LE1/YR: CPT | Performed by: INTERNAL MEDICINE

## 2022-07-13 PROCEDURE — G8510 SCR DEP NEG, NO PLAN REQD: HCPCS | Performed by: INTERNAL MEDICINE

## 2022-07-13 PROCEDURE — G9899 SCRN MAM PERF RSLTS DOC: HCPCS | Performed by: INTERNAL MEDICINE

## 2022-07-13 PROCEDURE — 99214 OFFICE O/P EST MOD 30 MIN: CPT | Performed by: INTERNAL MEDICINE

## 2022-07-13 PROCEDURE — 1090F PRES/ABSN URINE INCON ASSESS: CPT | Performed by: INTERNAL MEDICINE

## 2022-07-13 PROCEDURE — G8417 CALC BMI ABV UP PARAM F/U: HCPCS | Performed by: INTERNAL MEDICINE

## 2022-07-13 PROCEDURE — 3017F COLORECTAL CA SCREEN DOC REV: CPT | Performed by: INTERNAL MEDICINE

## 2022-07-13 PROCEDURE — G8752 SYS BP LESS 140: HCPCS | Performed by: INTERNAL MEDICINE

## 2022-07-13 PROCEDURE — G8400 PT W/DXA NO RESULTS DOC: HCPCS | Performed by: INTERNAL MEDICINE

## 2022-07-13 PROCEDURE — G8754 DIAS BP LESS 90: HCPCS | Performed by: INTERNAL MEDICINE

## 2022-07-13 PROCEDURE — G8427 DOCREV CUR MEDS BY ELIG CLIN: HCPCS | Performed by: INTERNAL MEDICINE

## 2022-07-13 RX ORDER — AMITRIPTYLINE HYDROCHLORIDE 10 MG/1
10 TABLET, FILM COATED ORAL
Qty: 90 TABLET | Refills: 0 | Status: SHIPPED | OUTPATIENT
Start: 2022-07-13 | End: 2022-08-11 | Stop reason: SDUPTHER

## 2022-07-13 RX ORDER — TELMISARTAN 20 MG/1
20 TABLET ORAL DAILY
Qty: 90 TABLET | Refills: 1 | Status: SHIPPED | OUTPATIENT
Start: 2022-07-13 | End: 2022-10-26 | Stop reason: DRUGHIGH

## 2022-07-13 NOTE — PROGRESS NOTES
Zahra Diamond is a 80 y.o. female and presents with Follow Up Chronic Condition, Hypertension, GERD, Thyroid Problem, COPD, and Fibromyalgia    Ms. Garo Campos came for regular follow-up. In the interval time she has undergone FNA for thyroid nodule which was benign and she had consulted endocrinologist.  She is taking thyroid supplementation for hypothyroidism currently taking trazodone 50 mg every other day being on amitriptyline 25 mg. She was complaining of pain due to fibromyalgia and she follows excellent rheumatologist Dr. Marcella Bermudez and getting pregabalin however she was recommended to taper the dose of trazodone but she cannot sleep without taking trazodone and also I had a long discussion regarding various side effects of amitriptyline especially QT interval and on the heart and she does not think she has that much improvement for her fibromyalgia pain so decided to taper the dose of 10 mg that we mutually agreed and she will start trazodone 50 mg every day. She takes hydroxyzine for anxiety attacks and also for chronic itching. She has COPD taking rescue and maintenance inhaler and complemented for quit smoking since June 30. She is having concern for weight gain and we discussed various medicine including short-term treatment and chronic maintenance treatment with Qsymia and to try [place] and she agreed to try Saxenda. No family history of or personal history of medullary thyroid cancer. She says that she is monitored for CT lung and she is not due until next February. She is very compliant for her medicines lives in her mobile home in her personal property getting help from her daughter. She has nebulizer machine. She does not want to have COVID-vaccine. She is up to date for her mammogram.    Today her blood pressure is slightly elevated. She has no stress. She has not taken coffee before coming here. Review of Systems    Review of Systems   Constitutional: Negative.     HENT: Negative for sinus pain and sore throat. Eyes: Negative for blurred vision. Respiratory: Negative for cough and wheezing. Has h/o copd    Cardiovascular: Negative. Gastrointestinal: Negative. Genitourinary: Negative. Neurological: Negative for dizziness and headaches. Psychiatric/Behavioral: Negative for depression, hallucinations, memory loss and substance abuse. The patient has insomnia. The patient is not nervous/anxious.          Past Medical History:   Diagnosis Date    Acquired hypothyroidism 2020    Anxiety 2020    Chronic obstructive lung disease ([facility] 2020    Cigarette smoker 2020    Depression     Dyslipidemia 2020    Essential hypertension, malignant 2020    Fibromyalgia 2020    Gastroesophageal reflux disease 2020    Insomnia 2020    Nicotine dependence 2020    Nonspecific abnormal electrocardiogram (ECG) (EKG) 2020    Obesity, unspecified 2020    Preseptal cellulitis 2020    Pruritic rash 2020    Pure hypercholesterolemia 2020    Sleep disorder     Wheezing 2020     Past Surgical History:   Procedure Laterality Date    HX GYN  1994    HX TONSILLECTOMY  2002    IR FINE NEEDLE ASPIRATION W IMAGE  2022     Social History     Socioeconomic History    Marital status:    Tobacco Use    Smoking status: Former Smoker     Packs/day: 0.50     Years: 48.00     Pack years: 24.00     Types: Cigarettes     Quit date: 2022     Years since quittin.0    Smokeless tobacco: Never Used   Vaping Use    Vaping Use: Never used   Substance and Sexual Activity    Alcohol use: Not Currently     Comment: 30 years    Drug use: Never     Social Determinants of Health     Financial Resource Strain: Low Risk     Difficulty of Paying Living Expenses: Not very hard   Food Insecurity: No Food Insecurity    Worried About Running Out of Food in the Last Year: Never true    Ran Out of Food in the Last Year: Never true     Family History   Problem Relation Age of Onset    Hypertension Father     Cancer Mother         lung     Current Outpatient Medications   Medication Sig Dispense Refill    telmisartan (MICARDIS) 20 mg tablet Take 1 Tablet by mouth daily. 90 Tablet 1    amitriptyline (ELAVIL) 10 mg tablet Take 1 Tablet by mouth nightly. Stopped 25 mg 90 Tablet 0    liraglutide, weight loss, (SAXENDA) 3 mg/0.5 mL (18 mg/3 mL) pen 0.6 mg by SubCUTAneous route daily. Indications: weight loss management for an obese person 30 Each 1    albuterol (ProAir HFA) 90 mcg/actuation inhaler Take 2 Puffs by inhalation every six (6) hours as needed for Wheezing. 1 Each 5    hydrOXYzine HCL (ATARAX) 25 mg tablet TAKE 1 TABLET TWICE A DAY FOR ITCHING 180 Tablet 0    traZODone (DESYREL) 50 mg tablet Take 1 Tablet by mouth nightly. (Patient taking differently: Take 50 mg by mouth every other day. Take half a pill every other day) 90 Tablet 1    triamterene-hydroCHLOROthiazide (DYAZIDE) 37.5-25 mg per capsule Take 1 Capsule by mouth Every morning. 90 Capsule 1    triamcinolone acetonide (KENALOG) 0.1 % topical cream APPLY TO THE AFFECTED AREA TWICE A DAY USE A THIN LAYER 45 g 14    omeprazole (PRILOSEC) 40 mg capsule TAKE 1 CAPSULE DAILY 90 Capsule 1    atorvastatin (LIPITOR) 40 mg tablet Take 1 Tablet by mouth daily. 30 Tablet 5    levothyroxine (SYNTHROID) 50 mcg tablet TAKE 1 TABLET DAILY IN THE MORNING 90 Tablet 3    albuterol (PROVENTIL VENTOLIN) 2.5 mg /3 mL (0.083 %) nebu 3 mL by Nebulization route every six (6) hours as needed for Wheezing. 120 mL 5    albuterol (PROVENTIL HFA, VENTOLIN HFA, PROAIR HFA) 90 mcg/actuation inhaler Take 1 Puff by inhalation every six (6) hours as needed for Wheezing. 1 Inhaler 5    pregabalin (LYRICA) 150 mg capsule Take  by mouth two (2) times a day.        Allergies   Allergen Reactions    Nicotine Itching     Pt is allergic to nicotine patches     Codeine Rash       Objective:  Visit Vitals  BP (!) 150/82 (BP 1 Location: Right upper arm, BP Cuff Size: Adult)   Pulse 72   Temp 97.3 °F (36.3 °C) (Oral)   Resp 18   Ht 5' 3\" (1.6 m)   Wt 198 lb 3.2 oz (89.9 kg)   SpO2 96%   BMI 35.11 kg/m²       Physical Exam:   Constitutional: General Appearance: Pleasant personality with morbid obesity. Level of Distress: NAD. Psychiatric: Mental Status: normal mood and affect Orientation: to time, place, and person. ,normal eye contact. Head: Head: normocephalic and atraumatic. Eyes: Pupils: PERRLA. Sclerae: non-icteric. Neck: Neck: supple, trachea midline, and no masses. Lymph Nodes: no cervical LAD. Thyroid: no enlargement or nodules and non-tender. Lungs: Respiratory effort: no dyspnea. Auscultation: no wheezing, rales/crackles, or rhonchi and breath sounds normal and good air movement. Cardiovascular: Apical Impulse: not displaced. Heart Auscultation: normal S1 and S2; no murmurs, rubs, or gallops; and RRR. Neck vessels: no carotid bruits. Pulses including femoral / pedal: normal throughout. Abdomen: Bowel Sounds: normal. Inspection and Palpation: no tenderness, guarding, or masses and soft and non-distended. Liver: non-tender and no hepatomegaly. Spleen: non-tender and no splenomegaly. Musculoskeletal[de-identified] Extremities: no edema,no varicosities. No Calf tenderness. Neurologic: Gait and Station: normal gait and station. Motor Strength normal right and left. Skin: Inspection and palpation: no rash, lesions, or ulcer.        Results for orders placed or performed in visit on 13/12/18   METABOLIC PANEL, COMPREHENSIVE   Result Value Ref Range    Glucose 70 65 - 99 mg/dL    BUN 9 8 - 27 mg/dL    Creatinine 0.67 0.57 - 1.00 mg/dL    eGFR 94 >59 mL/min/1.73    BUN/Creatinine ratio 13 12 - 28    Sodium 134 134 - 144 mmol/L    Potassium 4.3 3.5 - 5.2 mmol/L    Chloride 93 (L) 96 - 106 mmol/L    CO2 25 20 - 29 mmol/L    Calcium 9.0 8.7 - 10.3 mg/dL    Protein, total 6.8 6.0 - 8.5 g/dL    Albumin 4.7 3.8 - 4.8 g/dL    GLOBULIN, TOTAL 2.1 1.5 - 4.5 g/dL    A-G Ratio 2.2 1.2 - 2.2    Bilirubin, total 0.4 0.0 - 1.2 mg/dL    Alk. phosphatase 74 44 - 121 IU/L    AST (SGOT) 9 0 - 40 IU/L    ALT (SGPT) 10 0 - 32 IU/L   TSH RFX ON ABNORMAL TO FREE T4   Result Value Ref Range    TSH 2.130 0.450 - 4.500 uIU/mL   HEPATITIS C AB   Result Value Ref Range    Hep C Virus Ab <0.1 0.0 - 0.9 s/co ratio   CBC WITH AUTOMATED DIFF   Result Value Ref Range    WBC 7.3 3.4 - 10.8 x10E3/uL    RBC 5.21 3.77 - 5.28 x10E6/uL    HGB 15.3 11.1 - 15.9 g/dL    HCT 46.1 34.0 - 46.6 %    MCV 89 79 - 97 fL    MCH 29.4 26.6 - 33.0 pg    MCHC 33.2 31.5 - 35.7 g/dL    RDW 12.4 11.7 - 15.4 %    PLATELET 174 336 - 917 x10E3/uL    NEUTROPHILS 41 Not Estab. %    Lymphocytes 42 Not Estab. %    MONOCYTES 11 Not Estab. %    EOSINOPHILS 4 Not Estab. %    BASOPHILS 2 Not Estab. %    ABS. NEUTROPHILS 3.0 1.4 - 7.0 x10E3/uL    Abs Lymphocytes 3.0 0.7 - 3.1 x10E3/uL    ABS. MONOCYTES 0.8 0.1 - 0.9 x10E3/uL    ABS. EOSINOPHILS 0.3 0.0 - 0.4 x10E3/uL    ABS. BASOPHILS 0.1 0.0 - 0.2 x10E3/uL    IMMATURE GRANULOCYTES 0 Not Estab. %    ABS. IMM. GRANS. 0.0 0.0 - 0.1 x10E3/uL   LIPID PANEL   Result Value Ref Range    Cholesterol, total 187 100 - 199 mg/dL    Triglyceride 134 0 - 149 mg/dL    HDL Cholesterol 47 >39 mg/dL    VLDL, calculated 24 5 - 40 mg/dL    LDL, calculated 116 (H) 0 - 99 mg/dL       Assessment/Plan:      ICD-10-CM ICD-9-CM    1. Essential hypertension  J85 810.9 METABOLIC PANEL, BASIC   2. Chronic obstructive pulmonary disease, unspecified COPD type ([facility]  J44.9 496    3. Pure hypercholesterolemia  E78.00 272.0    4. Insomnia, unspecified type  G47.00 780.52    5. Hypothyroidism, unspecified type  E03.9 244.9    6. Fibromyalgia  M79.7 729.1    7. Gastroesophageal reflux disease without esophagitis  K21.9 530.81    8. Severe obesity (BMI 35.0-39. 9) with comorbidity ([facility]  E66.01 278.01    9.  Chronic pruritus  L29.9 698.9 10. Anxiety  F41.9 300.00    11. Lung nodule  R91.1 793.11    12. Ex-smoker for less than 1 year  Z78.9 V49.89      Orders Placed This Encounter    METABOLIC PANEL, BASIC    telmisartan (MICARDIS) 20 mg tablet     Sig: Take 1 Tablet by mouth daily. Dispense:  90 Tablet     Refill:  1    amitriptyline (ELAVIL) 10 mg tablet     Sig: Take 1 Tablet by mouth nightly. Stopped 25 mg     Dispense:  90 Tablet     Refill:  0    liraglutide, weight loss, (SAXENDA) 3 mg/0.5 mL (18 mg/3 mL) pen     Si.6 mg by SubCUTAneous route daily. Indications: weight loss management for an obese person     Dispense:  30 Each     Refill:  1     Hypertension running slightly on upper side of normal range started on telmisartan 20 mg/day and continued on triamterene HCTZ 37.5/25 mg once a day and morning diet low in sodium. Hypercholesteremia continued atorvastatin 40 mg at bedtime diet low in fat. Hypothyroidism with history of thyroid nodule s/p FNA consulted by endocrinologist and it was benign and continued on current levothyroxine 50 mcg once a day in early morning. Insomnia recommended to start taking trazodone 50 mg every day. Fibromyalgia under care of rheumatologist Dr. Liana Dawn getting pregabalin 150 mg twice a day I had added amitriptyline but decided to taper the dose due to lot of adverse side effects on the heart and she agreed and it is not helping much so would decrease and taper the dose to 10 mg/day. Anxiety with chronic pruritus taking hydroxyzine 25 mg twice a day and she was taking before she established with me when she has relocated from [place]. [facility]. GERD taking omeprazole. Obesity with comorbidities started on liraglutide in she has no history of medullary thyroid cancer no nausea vomiting side effect discussed we will start with low-dose 0.6 mg subcu every day gradually we will increase the dose.     She has habit of snacking in the midnight and discussed in length about behavioral modification and difference between craving and satiety and hunger and she agreed we had a long discussion to eat more known sugary fruits and vegetables instead of unhealthy snacks. She is pleasant personality and we had a transparent discussion regarding various nonmedication and approach to lose weight. Due to COPD she is not walking that much if possible 2 miles per day minimum. COPD continue rescue and maintenance inhaler and she hasNebulizer machine and she is complemented for quit smoking since June 30. Reassured and explained that weight gain is not the reason to go back on smoking and she will feel much better later on. Lung nodule monitored with CT scan it is not significantly enlarged. Healthy lifestyle modification    She should get Pneumovax 23, Tdap vaccine and Shingrix vaccine from the pharmacy. She does not want to get COVID vaccination. Follow-up in 3 months. Refills given. Labs ordered. lose weight, increase physical activity, bring BP log to office visit, follow low fat diet, follow low salt diet, continue present plan, routine labs ordered, call if any problems    There are no Patient Instructions on file for this visit. Follow-up and Dispositions    · Return in about 3 months (around 10/13/2022) for follow up for chronic condition.

## 2022-07-13 NOTE — PROGRESS NOTES
1. \"Have you been to the ER, urgent care clinic since your last visit? Hospitalized since your last visit? \" No    2. \"Have you seen or consulted any other health care providers outside of the [address] [place] [address] since your last visit? \" No     3. For patients aged 39-70: Has the patient had a colonoscopy / FIT/ Cologuard? Yes - Care Gap present. Most recent result on file      If the patient is female:    4. For patients aged 41-77: Has the patient had a mammogram within the past 2 years? No      5. For patients aged 21-65: Has the patient had a pap smear?  NA - based on age or sex     Chief Complaint   Patient presents with    Follow Up Chronic Condition    Hypertension    GERD    Thyroid Problem    COPD    Fibromyalgia     Visit Vitals  BP (!) 151/82 (BP 1 Location: Right upper arm, BP Patient Position: Sitting, BP Cuff Size: Adult)   Pulse 75   Temp 97.3 °F (36.3 °C) (Oral)   Resp 18   Ht 5' 3\" (1.6 m)   Wt 198 lb 3.2 oz (89.9 kg)   SpO2 96%   BMI 35.11 kg/m²

## 2022-08-11 RX ORDER — AMITRIPTYLINE HYDROCHLORIDE 10 MG/1
10 TABLET, FILM COATED ORAL
Qty: 90 TABLET | Refills: 0 | Status: SHIPPED | OUTPATIENT
Start: 2022-08-11

## 2022-08-11 RX ORDER — HYDROXYZINE 25 MG/1
25 TABLET, FILM COATED ORAL
Qty: 180 TABLET | Refills: 0 | Status: SHIPPED | OUTPATIENT
Start: 2022-08-11

## 2022-08-12 RX ORDER — ALBUTEROL SULFATE 0.83 MG/ML
2.5 SOLUTION RESPIRATORY (INHALATION)
Qty: 360 ML | Refills: 5 | Status: SHIPPED | OUTPATIENT
Start: 2022-08-12

## 2022-10-17 PROBLEM — M19.90 OSTEOARTHROSIS: Status: RESOLVED | Noted: 2022-06-09 | Resolved: 2022-10-17

## 2022-10-17 PROBLEM — Z78.9 EX-SMOKER FOR LESS THAN 1 YEAR: Status: RESOLVED | Noted: 2022-07-13 | Resolved: 2022-10-17

## 2022-10-26 ENCOUNTER — OFFICE VISIT (OUTPATIENT)
Dept: INTERNAL MEDICINE CLINIC | Age: 72
End: 2022-10-26
Payer: MEDICARE

## 2022-10-26 VITALS
WEIGHT: 198 LBS | OXYGEN SATURATION: 96 % | HEART RATE: 72 BPM | DIASTOLIC BLOOD PRESSURE: 82 MMHG | HEIGHT: 63 IN | BODY MASS INDEX: 35.08 KG/M2 | RESPIRATION RATE: 18 BRPM | SYSTOLIC BLOOD PRESSURE: 142 MMHG

## 2022-10-26 DIAGNOSIS — M79.7 FIBROMYALGIA: ICD-10-CM

## 2022-10-26 DIAGNOSIS — E78.00 PURE HYPERCHOLESTEROLEMIA: ICD-10-CM

## 2022-10-26 DIAGNOSIS — F41.9 ANXIETY: ICD-10-CM

## 2022-10-26 DIAGNOSIS — E03.9 HYPOTHYROIDISM, UNSPECIFIED TYPE: ICD-10-CM

## 2022-10-26 DIAGNOSIS — E66.01 SEVERE OBESITY (BMI 35.0-39.9) WITH COMORBIDITY (HCC): ICD-10-CM

## 2022-10-26 DIAGNOSIS — Z78.9 EX-SMOKER FOR LESS THAN 1 YEAR: ICD-10-CM

## 2022-10-26 DIAGNOSIS — J44.9 CHRONIC OBSTRUCTIVE PULMONARY DISEASE, UNSPECIFIED COPD TYPE (HCC): ICD-10-CM

## 2022-10-26 DIAGNOSIS — I10 ESSENTIAL HYPERTENSION: Primary | ICD-10-CM

## 2022-10-26 DIAGNOSIS — M05.9 RHEUMATOID ARTHRITIS WITH POSITIVE RHEUMATOID FACTOR, INVOLVING UNSPECIFIED SITE (HCC): ICD-10-CM

## 2022-10-26 DIAGNOSIS — K21.9 GASTROESOPHAGEAL REFLUX DISEASE WITHOUT ESOPHAGITIS: ICD-10-CM

## 2022-10-26 DIAGNOSIS — L29.9 CHRONIC PRURITUS: ICD-10-CM

## 2022-10-26 DIAGNOSIS — G47.00 INSOMNIA, UNSPECIFIED TYPE: ICD-10-CM

## 2022-10-26 PROBLEM — F17.200 NICOTINE DEPENDENCE: Status: RESOLVED | Noted: 2020-07-16 | Resolved: 2022-10-26

## 2022-10-26 PROCEDURE — G8510 SCR DEP NEG, NO PLAN REQD: HCPCS | Performed by: INTERNAL MEDICINE

## 2022-10-26 PROCEDURE — G8536 NO DOC ELDER MAL SCRN: HCPCS | Performed by: INTERNAL MEDICINE

## 2022-10-26 PROCEDURE — G8754 DIAS BP LESS 90: HCPCS | Performed by: INTERNAL MEDICINE

## 2022-10-26 PROCEDURE — G8400 PT W/DXA NO RESULTS DOC: HCPCS | Performed by: INTERNAL MEDICINE

## 2022-10-26 PROCEDURE — 3017F COLORECTAL CA SCREEN DOC REV: CPT | Performed by: INTERNAL MEDICINE

## 2022-10-26 PROCEDURE — 1101F PT FALLS ASSESS-DOCD LE1/YR: CPT | Performed by: INTERNAL MEDICINE

## 2022-10-26 PROCEDURE — G9899 SCRN MAM PERF RSLTS DOC: HCPCS | Performed by: INTERNAL MEDICINE

## 2022-10-26 PROCEDURE — G8427 DOCREV CUR MEDS BY ELIG CLIN: HCPCS | Performed by: INTERNAL MEDICINE

## 2022-10-26 PROCEDURE — G8753 SYS BP > OR = 140: HCPCS | Performed by: INTERNAL MEDICINE

## 2022-10-26 PROCEDURE — G8417 CALC BMI ABV UP PARAM F/U: HCPCS | Performed by: INTERNAL MEDICINE

## 2022-10-26 PROCEDURE — 99214 OFFICE O/P EST MOD 30 MIN: CPT | Performed by: INTERNAL MEDICINE

## 2022-10-26 PROCEDURE — 1090F PRES/ABSN URINE INCON ASSESS: CPT | Performed by: INTERNAL MEDICINE

## 2022-10-26 RX ORDER — FOLIC ACID 1 MG/1
TABLET ORAL DAILY
COMMUNITY

## 2022-10-26 RX ORDER — ALBUTEROL SULFATE 90 UG/1
2 AEROSOL, METERED RESPIRATORY (INHALATION)
Qty: 3 EACH | Refills: 3 | Status: SHIPPED | OUTPATIENT
Start: 2022-10-26

## 2022-10-26 RX ORDER — METHOTREXATE 2.5 MG/1
2.5 TABLET ORAL
COMMUNITY
Start: 2022-09-19

## 2022-10-26 RX ORDER — TELMISARTAN 20 MG/1
20 TABLET ORAL DAILY
Qty: 90 TABLET | Refills: 1 | Status: SHIPPED | OUTPATIENT
Start: 2022-10-26

## 2022-10-26 RX ORDER — FOLIC ACID 1 MG/1
1 TABLET ORAL DAILY
COMMUNITY
Start: 2022-09-19

## 2022-10-26 NOTE — PROGRESS NOTES
1. \"Have you been to the ER, urgent care clinic since your last visit? Hospitalized since your last visit? \" No    2. \"Have you seen or consulted any other health care providers outside of the [address] [place] [address] since your last visit? \" No     3. For patients aged 39-70: Has the patient had a colonoscopy / FIT/ Cologuard? Yes - no Care Gap present      If the patient is female:    4. For patients aged 41-77: Has the patient had a mammogram within the past 2 years? No      5. For patients aged 21-65: Has the patient had a pap smear?  NA - based on age or sex      Chief Complaint   Patient presents with    Follow Up Chronic Condition        Visit Vitals  BP (!) 147/84 (BP 1 Location: Right arm, BP Patient Position: Sitting, BP Cuff Size: Adult)   Pulse 77   Resp 18   Ht 5' 3\" (1.6 m)   Wt 198 lb (89.8 kg)   SpO2 96%   BMI 35.07 kg/m²

## 2022-10-26 NOTE — PROGRESS NOTES
Royal Melton (: 1950) is a 80 y.o. female, established patient, here for evaluation of the following chief complaint(s):  Follow Up Chronic Condition         ASSESSMENT/PLAN:  Below is the assessment and plan developed based on review of pertinent history, physical exam, labs, studies, and medications. 1. Essential hypertension  2. Chronic obstructive pulmonary disease, unspecified COPD type ([address]  3. Hypothyroidism, unspecified type  4. Pure hypercholesterolemia  5. Rheumatoid arthritis with positive rheumatoid factor, involving unspecified site ([address]  6. Insomnia, unspecified type  7. Fibromyalgia  8. Severe obesity (BMI 35.0-39. 9) with comorbidity ([address]  9. Gastroesophageal reflux disease without esophagitis  10. Chronic pruritus  11. Anxiety  12. Ex-smoker for less than 1 year    Hypertension blood pressure is slightly elevated. She has not been taking telmisartan 20 mg for last few weeks she says she ran out and it was started by other physicians so started back on telmisartan 20 mg/day continued on triamterene HCTZ 37.5/25 mg/day. COPD due to smoking cigarette taking rescue and maintenance inhaler refill sent to the pharmacy. Hypothyroidism getting levothyroxine 50 mcg once a day morning. Recently having rheumatoid arthritis factor positive I do not have details however we will try to get the labs done at [facility] Dr. [place] office to get the lab results so I will not order to prevent duplication she has been started on methotrexate 2.5 mg 6 tablet every Tuesday with folic acid supplements and she said x-ray chest was done as a baseline. She is aware with the side effects of methotrexate she had a pain in wrist joint and other joints.     Fibromyalgia also getting pregabalin and hydroxyzine for insomnia, does not want to stop low-dose of amitriptyline    For insomnia getting trazodone 50 mg every day and takes hydroxyzine for sleeping and for anxiety she does not want to come off from hydroxyzine. Hypercholesteremia getting atorvastatin 40 mg at bedtime she says she is not due for low-dose lung CT scan until February 2023 she follows pulmonologist.    GERD getting omeprazole. Healthy lifestyle. She quit smoking since June 2022. She does not want to take age-appropriate preventive vaccinations and discuss about her side effects from methotrexate and monitoring that she is under care of rheumatologist      Return in about 4 months (around 2/26/2023) for follow up for chronic condition. SUBJECTIVE/OBJECTIVE:    HPI:  Ms. Maki Ely came for regular follow-up. She has quit smoking cigarettes since June 2022. Her blood pressure is elevated. She ran out of telmisartan 20 mg/day she says that it was started by another physician that she has ran out and she does not like [facility] she wants me to send refills to the [facility] that I sent she needs refill inhaler especially rescue inhaler she has nebulizer machine she has COPD follows pulmonologist also recently her rheumatoid arthritis factor was positive has been started on methotrexate with folic acid. She has fibromyalgia under control and she takes trazodone and amitriptyline and hydroxyzine under control no itching no anxiety or panic attacks palace. No depression does not want to take any vaccinations as per the age for health maintenance. She is not due for another low-dose lung CT until February 2023. Review of Systems   Constitutional: Negative. HENT:  Negative for congestion and sore throat. Eyes:  Negative for blurred vision. Respiratory:  Negative for cough, shortness of breath and wheezing. Cardiovascular: Negative. Gastrointestinal: Negative. Genitourinary: Negative. Musculoskeletal: Negative. Neurological:  Negative for dizziness and headaches. Psychiatric/Behavioral: Negative.        Vitals:    10/26/22 1541 10/26/22 1606   BP: (!) 147/84 (!) 142/82   Pulse: 77 72   Resp: 18 SpO2: 96%    Weight: 198 lb (89.8 kg)    Height: 5' 3\" (1.6 m)       Physical Exam  Vitals and nursing note reviewed. Constitutional:       General: She is not in acute distress. Appearance: She is obese. She is not toxic-appearing. Cardiovascular:      Rate and Rhythm: Normal rate and regular rhythm. Pulses: Normal pulses. Heart sounds: Normal heart sounds. Pulmonary:      Effort: Pulmonary effort is normal.      Breath sounds: Normal breath sounds. No rales. Chest:      Chest wall: No tenderness. Abdominal:      General: There is no distension. Palpations: Abdomen is soft. Tenderness: There is no abdominal tenderness. Musculoskeletal:      Cervical back: Neck supple. Right lower leg: No edema. Left lower leg: No edema. Neurological:      General: No focal deficit present. Mental Status: She is alert and oriented to person, place, and time. Cranial Nerves: No cranial nerve deficit. Gait: Gait normal.   Psychiatric:         Mood and Affect: Mood normal.         Behavior: Behavior normal.       On this date 10/26/2022 I have spent 30 minutes reviewing previous notes, test results and face to face with the patient discussing the diagnosis and importance of compliance with the treatment plan as well as documenting on the day of the visit.     Signed by:  Abdoulaye Cornejo MD

## 2022-11-09 RX ORDER — ATORVASTATIN CALCIUM 40 MG/1
40 TABLET, FILM COATED ORAL DAILY
Qty: 30 TABLET | Refills: 5 | Status: SHIPPED | OUTPATIENT
Start: 2022-11-09

## 2022-11-09 RX ORDER — ATORVASTATIN CALCIUM 40 MG/1
40 TABLET, FILM COATED ORAL DAILY
Qty: 30 TABLET | Refills: 5 | OUTPATIENT
Start: 2022-11-09

## 2022-11-29 RX ORDER — AMITRIPTYLINE HYDROCHLORIDE 10 MG/1
TABLET, FILM COATED ORAL
Qty: 90 TABLET | Refills: 1 | Status: SHIPPED | OUTPATIENT
Start: 2022-11-29

## 2022-12-05 RX ORDER — HYDROXYZINE 25 MG/1
25 TABLET, FILM COATED ORAL
Qty: 180 TABLET | Refills: 1 | Status: SHIPPED | OUTPATIENT
Start: 2022-12-05

## 2022-12-06 DIAGNOSIS — R30.9 PAIN PASSING URINE: Primary | ICD-10-CM

## 2022-12-10 LAB
APPEARANCE UR: ABNORMAL
BACTERIA #/AREA URNS HPF: ABNORMAL /[HPF]
BACTERIA UR CULT: ABNORMAL
BACTERIA UR CULT: ABNORMAL
BILIRUB UR QL STRIP: NEGATIVE
CASTS URNS QL MICRO: ABNORMAL /LPF
COLOR UR: YELLOW
EPI CELLS #/AREA URNS HPF: ABNORMAL /HPF (ref 0–10)
GLUCOSE UR QL STRIP: NEGATIVE
HGB UR QL STRIP: ABNORMAL
KETONES UR QL STRIP: NEGATIVE
LEUKOCYTE ESTERASE UR QL STRIP: ABNORMAL
MICRO URNS: ABNORMAL
NITRITE UR QL STRIP: POSITIVE
PH UR STRIP: 8 [PH] (ref 5–7.5)
PROT UR QL STRIP: ABNORMAL
RBC #/AREA URNS HPF: ABNORMAL /HPF (ref 0–2)
SP GR UR STRIP: 1.01 (ref 1–1.03)
URINALYSIS REFLEX, 377202: ABNORMAL
UROBILINOGEN UR STRIP-MCNC: 1 MG/DL (ref 0.2–1)
WBC #/AREA URNS HPF: >30 /HPF (ref 0–5)
YEAST #/AREA URNS HPF: PRESENT /[HPF]

## 2022-12-12 ENCOUNTER — TELEPHONE (OUTPATIENT)
Dept: INTERNAL MEDICINE CLINIC | Age: 72
End: 2022-12-12

## 2022-12-12 RX ORDER — CEFUROXIME AXETIL 500 MG/1
500 TABLET ORAL 2 TIMES DAILY
Qty: 14 TABLET | Refills: 0 | Status: SHIPPED | OUTPATIENT
Start: 2022-12-12

## 2022-12-12 NOTE — PROGRESS NOTES
Please inform Ms.  Eulogio Torres that she has common urinary infection with E. coli which is common infection sensitive to cephalexin group of drugs and I sent in the antibiotics 1 pill twice a day for 1 week and do not take with omeprazole and take with food and take with if possible over-the-counter probiotics

## 2022-12-12 NOTE — TELEPHONE ENCOUNTER
Patient called stated her labs came back over the weekend through my chart 12/10/22 and would like to know what needs to be done.

## 2022-12-15 NOTE — TELEPHONE ENCOUNTER
Called patient to let her know about her medication she stated she got the medication but was not aware until today that she should not take it with her omeprazole .

## 2023-02-26 PROBLEM — N95.9 MENOPAUSAL AND PERIMENOPAUSAL DISORDER: Status: ACTIVE | Noted: 2023-02-26

## 2023-03-03 ENCOUNTER — OFFICE VISIT (OUTPATIENT)
Dept: INTERNAL MEDICINE CLINIC | Age: 73
End: 2023-03-03

## 2023-03-03 VITALS
WEIGHT: 203 LBS | RESPIRATION RATE: 18 BRPM | OXYGEN SATURATION: 94 % | SYSTOLIC BLOOD PRESSURE: 120 MMHG | HEIGHT: 63 IN | BODY MASS INDEX: 35.97 KG/M2 | HEART RATE: 72 BPM | TEMPERATURE: 98.3 F | DIASTOLIC BLOOD PRESSURE: 68 MMHG

## 2023-03-03 DIAGNOSIS — M79.7 FIBROMYALGIA: ICD-10-CM

## 2023-03-03 DIAGNOSIS — M05.9 RHEUMATOID ARTHRITIS WITH POSITIVE RHEUMATOID FACTOR, INVOLVING UNSPECIFIED SITE (HCC): ICD-10-CM

## 2023-03-03 DIAGNOSIS — J44.9 CHRONIC OBSTRUCTIVE PULMONARY DISEASE, UNSPECIFIED COPD TYPE (HCC): ICD-10-CM

## 2023-03-03 DIAGNOSIS — G47.00 INSOMNIA, UNSPECIFIED TYPE: ICD-10-CM

## 2023-03-03 DIAGNOSIS — L29.9 CHRONIC PRURITUS: ICD-10-CM

## 2023-03-03 DIAGNOSIS — E66.01 SEVERE OBESITY (BMI 35.0-39.9) WITH COMORBIDITY (HCC): ICD-10-CM

## 2023-03-03 DIAGNOSIS — R13.10 DYSPHAGIA, UNSPECIFIED TYPE: ICD-10-CM

## 2023-03-03 DIAGNOSIS — F41.9 ANXIETY: ICD-10-CM

## 2023-03-03 DIAGNOSIS — M35.00 SICCA, UNSPECIFIED TYPE (HCC): ICD-10-CM

## 2023-03-03 DIAGNOSIS — I10 ESSENTIAL HYPERTENSION: ICD-10-CM

## 2023-03-03 DIAGNOSIS — N95.9 MENOPAUSAL AND PERIMENOPAUSAL DISORDER: ICD-10-CM

## 2023-03-03 DIAGNOSIS — E78.00 PURE HYPERCHOLESTEROLEMIA: ICD-10-CM

## 2023-03-03 DIAGNOSIS — K21.9 GASTROESOPHAGEAL REFLUX DISEASE WITHOUT ESOPHAGITIS: ICD-10-CM

## 2023-03-03 DIAGNOSIS — E03.9 HYPOTHYROIDISM, UNSPECIFIED TYPE: ICD-10-CM

## 2023-03-03 RX ORDER — OMEPRAZOLE 40 MG/1
40 CAPSULE, DELAYED RELEASE ORAL
Qty: 180 CAPSULE | Refills: 0 | Status: SHIPPED | OUTPATIENT
Start: 2023-03-03

## 2023-03-03 NOTE — PROGRESS NOTES
1. \"Have you been to the ER, urgent care clinic since your last visit? Hospitalized since your last visit? \" No    2. \"Have you seen or consulted any other health care providers outside of the [address] [place] [address] since your last visit? \" Yes When: Dec 2023 Where: Rheumatologist  Reason for visit: follow up       3. For patients aged 39-70: Has the patient had a colonoscopy / FIT/ Cologuard? Yes - Care Gap present. Most recent result on file      If the patient is female:    4. For patients aged 41-77: Has the patient had a mammogram within the past 2 years? No      5. For patients aged 21-65: Has the patient had a pap smear?  NA - based on age or sex    Chief Complaint   Patient presents with    Follow Up Chronic Condition    Hypertension    GERD    Hypothyroidism    COPD    Arthritis    Anxiety     Visit Vitals  /60 (BP 1 Location: Left upper arm, BP Patient Position: Sitting, BP Cuff Size: Adult)   Pulse 73   Temp 98.3 °F (36.8 °C) (Oral)   Resp 18   Ht 5' 3\" (1.6 m)   Wt 203 lb (92.1 kg)   SpO2 94%   BMI 35.96 kg/m²

## 2023-03-03 NOTE — PROGRESS NOTES
Albaro Dasilva (: 1950) is a 71 y.o. female, established patient, here for evaluation of the following chief complaint(s):  Follow Up Chronic Condition, Hypertension, GERD, Hypothyroidism, COPD, Arthritis, and Anxiety         ASSESSMENT/PLAN:  Below is the assessment and plan developed based on review of pertinent history, physical exam, labs, studies, and medications. 1. Essential hypertension  -     CBC WITH AUTOMATED DIFF  -     METABOLIC PANEL, COMPREHENSIVE  2. Chronic obstructive pulmonary disease, unspecified COPD type ([facility]  3. Pure hypercholesterolemia  -     LIPID PANEL  4. Dysphagia, unspecified type  -     REFERRAL TO ENT-OTOLARYNGOLOGY  -     REFERRAL TO GASTROENTEROLOGY  5. Hypothyroidism, unspecified type  -     TSH RFX ON ABNORMAL TO FREE T4  6. Insomnia, unspecified type  7. Sicca, unspecified type ([facility]  -     REFERRAL TO ENT-OTOLARYNGOLOGY  -     SJOGREN'S ABS, SSA AND SSB  8. Fibromyalgia  9. Severe obesity (BMI 35.0-39. 9) with comorbidity ([facility]  10. Rheumatoid arthritis with positive rheumatoid factor, involving unspecified site ([facility]  11. Anxiety  12. Gastroesophageal reflux disease without esophagitis  13. Chronic pruritus  14. Menopausal and perimenopausal disorder    Hypertension controlled continue telmisartan 20 mg once a day, triamterene HCTZ 37.5/25 mg once a day morning. Having chocking sensation occurring since long but gradually worsening. She says she has difficulty in swallowing solid and liquid. Went through different possibilities. And she also has dry mouth and explained that sometimes drug interaction from taking hydroxyzine, pregabalin, trazodone and amitriptyline can cause and she can gradually taper amitriptyline and get off from amitriptyline. She is already on pregabalin for her fibromyalgia. No nausea no vomiting no abdominal pain. No weight loss. No negative thoughts or depression. No hemoptysis or cough.   No pain while swallowing but chocking sensation with difficulty in swallowing for solid liquid and spices. Initially it was with spicy food but now with everything. It seems that hydroxyzine that she is taking for chronic itching and insomnia and trazodone for also insomnia. She is using Systane eyedrops I ordered Sjogren's antibodies in the blood less likely. Referred her to ENT specialist Dr. Sukh Qureshi that she has seen in the past and gastroenterologist to rule out any serious etiology causing dysphagia she has quit smoking cigarettes since last June or July 2022. She is already on omeprazole increase omeprazole twice a day 30 minutes before eating. COPD continue rescue and maintenance inhaler. Hypothyroidism taking levothyroxine 50 mcg once a day. Fibromyalgia taking pregabalin    Rheumatoid arthritis she brought her labs from Dr. Davida Jeffries office and she had labs done in July 2022 and she was positive for rheumatoid factor 77.9 and anti-CCP antibody was negative and she had a pain and she has been started on methotrexate anti-CCP antibody IgG/IgA was 7 units and uric acid was 4.6 and CBC CMP unremarkable. Getting methotrexate. And folic acid supplements. Anxiety with pruritus on hydroxyzine. History of GERD omeprazole increased dose. She declined for doing bone density. Answered all her concerns differential diagnosis and possibilities discussed. Labs ordered. Refills sent. Referrals given and she will make appointment with herself. Return in about 3 months (around 6/3/2023) for follow up for chronic condition. SUBJECTIVE/OBJECTIVE:  AMIRA Barron with pleasant personality came for regular follow-up. She has been started on methotrexate having rheumatoid factor positive and follows rheumatologist and also getting pregabalin for her fibromyalgia. Her blood pressure is controlled today. She is having shocking sensation that is gradually worsening.   She does not want to do bone density. She takes all medicines with compliance and also takes trazodone, pregabalin, hydroxyzine and amitriptyline. She takes statin also. No depression. But very much concern having chocking sensation whenever she eats also having dry mouth and dry eyes and using Systane eyedrops. No other abnormal cardiovascular symptoms like no chest pain or no sweating. No nausea vomiting. No change in bowel habits. No blood in stool or urine and no hemoptysis or cough. She has COPD but takes inhaler. Currently no cough with sputum. Allergies   Allergen Reactions    Nicotine Itching     Pt is allergic to nicotine patches     Codeine Rash     Current Outpatient Medications   Medication Sig    omeprazole (PRILOSEC) 40 mg capsule Take 1 Capsule by mouth ACB/HS. triamterene-hydroCHLOROthiazide (DYAZIDE) 37.5-25 mg per capsule TAKE 1 CAPSULE EVERY MORNING    hydrOXYzine HCL (ATARAX) 25 mg tablet Take 1 Tablet by mouth two (2) times daily as needed for Sleep or Agitation. amitriptyline (ELAVIL) 10 mg tablet TAKE 1 TABLET NIGHTLY (Patient taking differently: Taper the dose and stop gradually.)    atorvastatin (LIPITOR) 40 mg tablet Take 1 Tablet by mouth daily. methotrexate (RHEUMATREX) 2.5 mg tablet Take 2.5 mg by mouth every Tuesday. 6 pills once a week    folic acid (FOLVITE) 1 mg tablet Take 1 mg by mouth daily. telmisartan (MICARDIS) 20 mg tablet Take 1 Tablet by mouth daily. levothyroxine (SYNTHROID) 50 mcg tablet Take 1 Tablet by mouth Daily (before breakfast). Please take on empty stomach by itself in early morning. albuterol (PROVENTIL VENTOLIN) 2.5 mg /3 mL (0.083 %) nebu 3 mL by Nebulization route every six (6) hours as needed for Wheezing. traZODone (DESYREL) 50 mg tablet Take 1 Tablet by mouth nightly.     triamcinolone acetonide (KENALOG) 0.1 % topical cream APPLY TO THE AFFECTED AREA TWICE A DAY USE A THIN LAYER    albuterol (PROVENTIL HFA, VENTOLIN HFA, PROAIR HFA) 90 mcg/actuation inhaler Take 1 Puff by inhalation every six (6) hours as needed for Wheezing. pregabalin (LYRICA) 150 mg capsule Take  by mouth two (2) times a day. albuterol (ProAir HFA) 90 mcg/actuation inhaler Take 2 Puffs by inhalation every six (6) hours as needed for Wheezing. (Patient not taking: Reported on 3/3/2023)     No current facility-administered medications for this visit. Past Medical History:   Diagnosis Date    Acquired hypothyroidism 2020    Anxiety 2020    Chronic obstructive lung disease ([address] 2020    Cigarette smoker 2020    Depression     Dyslipidemia 2020    Essential hypertension, malignant 2020    Fibromyalgia 2020    Gastroesophageal reflux disease 2020    Insomnia 2020    Nicotine dependence 2020    Nonspecific abnormal electrocardiogram (ECG) (EKG) 2020    Obesity, unspecified 2020    Preseptal cellulitis 2020    Pruritic rash 2020    Pure hypercholesterolemia 2020    Sleep disorder     Wheezing 2020     Past Surgical History:   Procedure Laterality Date    HX GYN  1994    HX TONSILLECTOMY  2002    IR FINE NEEDLE ASPIRATION W IMAGE  2022     Family History   Problem Relation Age of Onset    Hypertension Father     Cancer Mother         lung     Social History     Tobacco Use   Smoking Status Former    Packs/day: 0.50    Years: 48.00    Pack years: 24.00    Types: Cigarettes    Quit date: 2022    Years since quittin.6   Smokeless Tobacco Never   Tobacco Comments    I'm officially done and over with smoking     Review of Systems   Constitutional: Negative. HENT:  Negative for congestion and sinus pain. Dry mouth   Eyes:  Negative for blurred vision. Respiratory:  Negative for cough, shortness of breath and wheezing. Cardiovascular: Negative. Gastrointestinal: Negative. Dysphagia   Genitourinary: Negative. Musculoskeletal: Negative.          Fibromyalgia and RA Skin:  Negative for rash. Neurological:  Negative for dizziness, tingling, tremors, speech change and headaches. Endo/Heme/Allergies:  Negative for polydipsia. Does not bruise/bleed easily. Psychiatric/Behavioral:  Negative for depression, hallucinations, memory loss, substance abuse and suicidal ideas. The patient is nervous/anxious. The patient does not have insomnia. Vitals:    03/03/23 1043 03/03/23 1128   BP: 109/60 120/68   Pulse: 73 72   Resp: 18    Temp: 98.3 °F (36.8 °C)    TempSrc: Oral    SpO2: 94%    Weight: 203 lb (92.1 kg)    Height: 5' 3\" (1.6 m)           Physical Exam  Vitals and nursing note reviewed. Constitutional:       General: She is not in acute distress. Appearance: She is obese. She is not ill-appearing or toxic-appearing. HENT:      Nose: No congestion. Mouth/Throat:      Pharynx: No oropharyngeal exudate or posterior oropharyngeal erythema. Eyes:      Pupils: Pupils are equal, round, and reactive to light. Cardiovascular:      Rate and Rhythm: Normal rate and regular rhythm. Pulses: Normal pulses. Heart sounds: Normal heart sounds. No murmur heard. Pulmonary:      Effort: Pulmonary effort is normal.      Breath sounds: Normal breath sounds. No rhonchi or rales. Abdominal:      General: Bowel sounds are normal. There is no distension. Palpations: Abdomen is soft. Tenderness: There is no abdominal tenderness. There is no guarding. Musculoskeletal:         General: No swelling, tenderness or deformity. Cervical back: Neck supple. Right lower leg: No edema. Left lower leg: No edema. Skin:     General: Skin is warm. Neurological:      General: No focal deficit present. Mental Status: She is alert and oriented to person, place, and time. Mental status is at baseline. Cranial Nerves: No cranial nerve deficit. Motor: No weakness.       Gait: Gait normal.   Psychiatric:         Mood and Affect: Mood normal. Behavior: Behavior normal.         On this date 03/03/2023 I have spent 35 minutes reviewing previous notes, test results and face to face with the patient discussing the diagnosis and importance of compliance with the treatment plan as well as documenting on the day of the visit. An electronic signature was used to authenticate this note.   -- Melvin Brush MD

## 2023-03-15 ENCOUNTER — TELEPHONE (OUTPATIENT)
Dept: INTERNAL MEDICINE CLINIC | Age: 73
End: 2023-03-15

## 2023-03-16 RX ORDER — TELMISARTAN AND HYDROCHLORTHIAZIDE 40; 12.5 MG/1; MG/1
1 TABLET ORAL EVERY MORNING
Qty: 90 TABLET | Refills: 1 | Status: SHIPPED | OUTPATIENT
Start: 2023-03-16

## 2023-04-18 RX ORDER — TRAZODONE HYDROCHLORIDE 50 MG/1
50 TABLET ORAL
Qty: 90 TABLET | Refills: 2 | Status: SHIPPED | OUTPATIENT
Start: 2023-04-18

## 2023-04-19 DIAGNOSIS — J44.9 CHRONIC OBSTRUCTIVE PULMONARY DISEASE, UNSPECIFIED COPD TYPE (HCC): ICD-10-CM

## 2023-04-19 RX ORDER — TELMISARTAN AND HYDROCHLORTHIAZIDE 40; 12.5 MG/1; MG/1
1 TABLET ORAL EVERY MORNING
Qty: 90 TABLET | Refills: 1 | Status: SHIPPED | OUTPATIENT
Start: 2023-04-19

## 2023-04-19 RX ORDER — ALBUTEROL SULFATE 90 UG/1
2 AEROSOL, METERED RESPIRATORY (INHALATION)
Qty: 3 EACH | Refills: 3 | Status: SHIPPED | OUTPATIENT
Start: 2023-04-19

## 2023-04-28 ENCOUNTER — TELEPHONE (OUTPATIENT)
Dept: INTERNAL MEDICINE CLINIC | Age: 73
End: 2023-04-28

## 2023-04-28 RX ORDER — ALBUTEROL SULFATE 0.83 MG/ML
2.5 SOLUTION RESPIRATORY (INHALATION)
Qty: 360 ML | Refills: 3 | Status: SHIPPED | OUTPATIENT
Start: 2023-04-28

## 2023-04-28 NOTE — TELEPHONE ENCOUNTER
----- Message from Roxane Guevara sent at 4/27/2023  9:51 AM EDT -----  Subject: Medication Problem    Medication: Other - albuterol Solution  Dosage: 2.5mg /3ml takes daily  Ordering Provider: undefined    Question/Problem: script was [facility] to the [facility] and needs [facility]   [facility] , not able to  at [place] , please   [facility] , please call once this has been done       Pharmacy: [facility] [phone]    ---------------------------------------------------------------------------  --------------  [address]  [phone]; OK to leave message on voicemail  ---------------------------------------------------------------------------  --------------    SCRIPT ANSWERS  Relationship to Patient: Self

## 2023-05-02 ENCOUNTER — OFFICE VISIT (OUTPATIENT)
Dept: ENT CLINIC | Age: 73
End: 2023-05-02
Payer: MEDICARE

## 2023-05-02 VITALS
HEART RATE: 76 BPM | WEIGHT: 203 LBS | DIASTOLIC BLOOD PRESSURE: 80 MMHG | RESPIRATION RATE: 17 BRPM | SYSTOLIC BLOOD PRESSURE: 120 MMHG | BODY MASS INDEX: 35.97 KG/M2 | HEIGHT: 63 IN | OXYGEN SATURATION: 98 %

## 2023-05-02 DIAGNOSIS — K21.9 LARYNGOPHARYNGEAL REFLUX (LPR): ICD-10-CM

## 2023-05-02 DIAGNOSIS — E04.2 MULTINODULAR GOITER: ICD-10-CM

## 2023-05-02 DIAGNOSIS — R13.14 PHARYNGOESOPHAGEAL DYSPHAGIA: Primary | ICD-10-CM

## 2023-05-02 DIAGNOSIS — R09.82 PND (POST-NASAL DRIP): ICD-10-CM

## 2023-05-02 PROCEDURE — G8536 NO DOC ELDER MAL SCRN: HCPCS | Performed by: OTOLARYNGOLOGY

## 2023-05-02 PROCEDURE — 1123F ACP DISCUSS/DSCN MKR DOCD: CPT | Performed by: OTOLARYNGOLOGY

## 2023-05-02 PROCEDURE — 3074F SYST BP LT 130 MM HG: CPT | Performed by: OTOLARYNGOLOGY

## 2023-05-02 PROCEDURE — G8510 SCR DEP NEG, NO PLAN REQD: HCPCS | Performed by: OTOLARYNGOLOGY

## 2023-05-02 PROCEDURE — G8417 CALC BMI ABV UP PARAM F/U: HCPCS | Performed by: OTOLARYNGOLOGY

## 2023-05-02 PROCEDURE — G8400 PT W/DXA NO RESULTS DOC: HCPCS | Performed by: OTOLARYNGOLOGY

## 2023-05-02 PROCEDURE — 1090F PRES/ABSN URINE INCON ASSESS: CPT | Performed by: OTOLARYNGOLOGY

## 2023-05-02 PROCEDURE — 99204 OFFICE O/P NEW MOD 45 MIN: CPT | Performed by: OTOLARYNGOLOGY

## 2023-05-02 PROCEDURE — 31575 DIAGNOSTIC LARYNGOSCOPY: CPT | Performed by: OTOLARYNGOLOGY

## 2023-05-02 PROCEDURE — G9899 SCRN MAM PERF RSLTS DOC: HCPCS | Performed by: OTOLARYNGOLOGY

## 2023-05-02 PROCEDURE — 3079F DIAST BP 80-89 MM HG: CPT | Performed by: OTOLARYNGOLOGY

## 2023-05-02 PROCEDURE — 3017F COLORECTAL CA SCREEN DOC REV: CPT | Performed by: OTOLARYNGOLOGY

## 2023-05-02 PROCEDURE — G8427 DOCREV CUR MEDS BY ELIG CLIN: HCPCS | Performed by: OTOLARYNGOLOGY

## 2023-05-02 PROCEDURE — 1101F PT FALLS ASSESS-DOCD LE1/YR: CPT | Performed by: OTOLARYNGOLOGY

## 2023-05-04 ENCOUNTER — TELEPHONE (OUTPATIENT)
Dept: INTERNAL MEDICINE CLINIC | Age: 73
End: 2023-05-04

## 2023-05-15 RX ORDER — OMEPRAZOLE 40 MG/1
CAPSULE, DELAYED RELEASE ORAL
Qty: 180 CAPSULE | Refills: 1 | Status: SHIPPED | OUTPATIENT
Start: 2023-05-15

## 2023-05-15 NOTE — TELEPHONE ENCOUNTER
----- Message from Jay Isis sent at 5/11/2023  3:50 PM EDT -----  Subject: Message to Provider    QUESTIONS  Information for Provider? Pt has made medication requests since 04/19/23   and several calls regarding letters she's received about the nebulizer   prescription from [employer] and proventil inhaler from [employer]. These   medications need preauthorization. Refills have been delayed because   information has not been received. Please advise patient as to what the   status is and why.   ---------------------------------------------------------------------------  --------------  Mariah JONES  [phone]; OK to leave message on voicemail  ---------------------------------------------------------------------------  --------------  SCRIPT ANSWERS  Relationship to Patient?  Self

## 2023-05-16 RX ORDER — IPRATROPIUM BROMIDE AND ALBUTEROL SULFATE 2.5; .5 MG/3ML; MG/3ML
1 SOLUTION RESPIRATORY (INHALATION) EVERY 4 HOURS PRN
Qty: 360 ML | Refills: 2 | Status: SHIPPED | OUTPATIENT
Start: 2023-05-16

## 2023-05-16 NOTE — TELEPHONE ENCOUNTER
I called the pharmacy and they stated that the Ipratropium 0.5/3ml inhaler solution is covered under her insurance. Can you please send that to her pharmacy. They stated that the Albuterol Inhaler is covered under her insurance. I pended the medication for you to refill.

## 2023-05-24 ENCOUNTER — TELEPHONE (OUTPATIENT)
Facility: CLINIC | Age: 73
End: 2023-05-24

## 2023-05-24 RX ORDER — AMITRIPTYLINE HYDROCHLORIDE 10 MG/1
1 TABLET, FILM COATED ORAL NIGHTLY
COMMUNITY
Start: 2022-11-29 | End: 2023-06-09 | Stop reason: ALTCHOICE

## 2023-05-24 RX ORDER — HYDROXYZINE HYDROCHLORIDE 25 MG/1
25 TABLET, FILM COATED ORAL 2 TIMES DAILY PRN
COMMUNITY
Start: 2022-12-05 | End: 2023-05-31

## 2023-05-24 RX ORDER — TRAZODONE HYDROCHLORIDE 50 MG/1
1 TABLET ORAL NIGHTLY
COMMUNITY
Start: 2023-04-18

## 2023-05-24 RX ORDER — FOLIC ACID 1 MG/1
1 TABLET ORAL DAILY
COMMUNITY
Start: 2022-09-19

## 2023-05-24 RX ORDER — LEVOTHYROXINE SODIUM 0.05 MG/1
50 TABLET ORAL
COMMUNITY
Start: 2022-10-17 | End: 2023-07-13

## 2023-05-24 RX ORDER — TRIAMCINOLONE ACETONIDE 1 MG/G
CREAM TOPICAL
COMMUNITY
Start: 2022-06-06

## 2023-05-24 RX ORDER — ALBUTEROL SULFATE 2.5 MG/3ML
2.5 SOLUTION RESPIRATORY (INHALATION) EVERY 6 HOURS PRN
COMMUNITY
Start: 2023-04-28 | End: 2023-06-27

## 2023-05-24 RX ORDER — TELMISARTAN AND HYDROCHLORTHIAZIDE 40; 12.5 MG/1; MG/1
1 TABLET ORAL EVERY MORNING
COMMUNITY
Start: 2023-04-19 | End: 2023-06-09 | Stop reason: SDUPTHER

## 2023-05-24 RX ORDER — ATORVASTATIN CALCIUM 40 MG/1
40 TABLET, FILM COATED ORAL DAILY
COMMUNITY
Start: 2022-11-09 | End: 2023-06-09

## 2023-05-24 RX ORDER — PREGABALIN 150 MG/1
CAPSULE ORAL 2 TIMES DAILY
COMMUNITY

## 2023-05-24 RX ORDER — OMEPRAZOLE 40 MG/1
40 CAPSULE, DELAYED RELEASE ORAL
COMMUNITY
Start: 2023-03-03

## 2023-05-24 RX ORDER — ALBUTEROL SULFATE 90 UG/1
2 AEROSOL, METERED RESPIRATORY (INHALATION) EVERY 4 HOURS PRN
COMMUNITY
Start: 2021-06-16

## 2023-05-31 RX ORDER — HYDROXYZINE HYDROCHLORIDE 25 MG/1
TABLET, FILM COATED ORAL
Qty: 180 TABLET | Refills: 1 | Status: SHIPPED | OUTPATIENT
Start: 2023-05-31

## 2023-05-31 RX ORDER — AMITRIPTYLINE HYDROCHLORIDE 10 MG/1
TABLET, FILM COATED ORAL
Qty: 90 TABLET | Refills: 3 | OUTPATIENT
Start: 2023-05-31

## 2023-06-06 RX ORDER — IPRATROPIUM BROMIDE AND ALBUTEROL SULFATE 2.5; .5 MG/3ML; MG/3ML
1 SOLUTION RESPIRATORY (INHALATION) EVERY 4 HOURS PRN
Qty: 360 ML | Refills: 2 | Status: SHIPPED | OUTPATIENT
Start: 2023-06-06

## 2023-06-09 ENCOUNTER — OFFICE VISIT (OUTPATIENT)
Facility: CLINIC | Age: 73
End: 2023-06-09
Payer: MEDICARE

## 2023-06-09 VITALS
DIASTOLIC BLOOD PRESSURE: 80 MMHG | BODY MASS INDEX: 36.5 KG/M2 | HEIGHT: 63 IN | SYSTOLIC BLOOD PRESSURE: 120 MMHG | WEIGHT: 206 LBS | RESPIRATION RATE: 16 BRPM | HEART RATE: 72 BPM | OXYGEN SATURATION: 98 %

## 2023-06-09 DIAGNOSIS — J44.9 CHRONIC OBSTRUCTIVE PULMONARY DISEASE, UNSPECIFIED COPD TYPE (HCC): ICD-10-CM

## 2023-06-09 DIAGNOSIS — F51.04 PSYCHOPHYSIOLOGICAL INSOMNIA: ICD-10-CM

## 2023-06-09 DIAGNOSIS — M79.7 FIBROMYALGIA: ICD-10-CM

## 2023-06-09 DIAGNOSIS — F41.9 ANXIETY: ICD-10-CM

## 2023-06-09 DIAGNOSIS — I10 ESSENTIAL HYPERTENSION: Primary | ICD-10-CM

## 2023-06-09 DIAGNOSIS — M05.9 RHEUMATOID ARTHRITIS WITH POSITIVE RHEUMATOID FACTOR, INVOLVING UNSPECIFIED SITE (HCC): ICD-10-CM

## 2023-06-09 DIAGNOSIS — Z12.31 SCREENING MAMMOGRAM FOR BREAST CANCER: ICD-10-CM

## 2023-06-09 DIAGNOSIS — L29.9 CHRONIC PRURITUS: ICD-10-CM

## 2023-06-09 DIAGNOSIS — E66.01 SEVERE OBESITY (BMI 35.0-39.9) WITH COMORBIDITY (HCC): ICD-10-CM

## 2023-06-09 DIAGNOSIS — E03.9 HYPOTHYROIDISM, UNSPECIFIED TYPE: ICD-10-CM

## 2023-06-09 DIAGNOSIS — E78.00 PURE HYPERCHOLESTEROLEMIA: ICD-10-CM

## 2023-06-09 PROCEDURE — 1090F PRES/ABSN URINE INCON ASSESS: CPT | Performed by: INTERNAL MEDICINE

## 2023-06-09 PROCEDURE — G8400 PT W/DXA NO RESULTS DOC: HCPCS | Performed by: INTERNAL MEDICINE

## 2023-06-09 PROCEDURE — 3023F SPIROM DOC REV: CPT | Performed by: INTERNAL MEDICINE

## 2023-06-09 PROCEDURE — 1123F ACP DISCUSS/DSCN MKR DOCD: CPT | Performed by: INTERNAL MEDICINE

## 2023-06-09 PROCEDURE — 3078F DIAST BP <80 MM HG: CPT | Performed by: INTERNAL MEDICINE

## 2023-06-09 PROCEDURE — 99214 OFFICE O/P EST MOD 30 MIN: CPT | Performed by: INTERNAL MEDICINE

## 2023-06-09 PROCEDURE — 1036F TOBACCO NON-USER: CPT | Performed by: INTERNAL MEDICINE

## 2023-06-09 PROCEDURE — G8417 CALC BMI ABV UP PARAM F/U: HCPCS | Performed by: INTERNAL MEDICINE

## 2023-06-09 PROCEDURE — 3074F SYST BP LT 130 MM HG: CPT | Performed by: INTERNAL MEDICINE

## 2023-06-09 PROCEDURE — 3017F COLORECTAL CA SCREEN DOC REV: CPT | Performed by: INTERNAL MEDICINE

## 2023-06-09 PROCEDURE — G8427 DOCREV CUR MEDS BY ELIG CLIN: HCPCS | Performed by: INTERNAL MEDICINE

## 2023-06-09 RX ORDER — TELMISARTAN AND HYDROCHLORTHIAZIDE 40; 12.5 MG/1; MG/1
1 TABLET ORAL EVERY MORNING
Qty: 90 TABLET | Refills: 2 | Status: SHIPPED | OUTPATIENT
Start: 2023-06-09

## 2023-06-09 SDOH — ECONOMIC STABILITY: FOOD INSECURITY: WITHIN THE PAST 12 MONTHS, THE FOOD YOU BOUGHT JUST DIDN'T LAST AND YOU DIDN'T HAVE MONEY TO GET MORE.: NEVER TRUE

## 2023-06-09 SDOH — ECONOMIC STABILITY: FOOD INSECURITY: WITHIN THE PAST 12 MONTHS, YOU WORRIED THAT YOUR FOOD WOULD RUN OUT BEFORE YOU GOT MONEY TO BUY MORE.: NEVER TRUE

## 2023-06-09 SDOH — ECONOMIC STABILITY: INCOME INSECURITY: HOW HARD IS IT FOR YOU TO PAY FOR THE VERY BASICS LIKE FOOD, HOUSING, MEDICAL CARE, AND HEATING?: NOT HARD AT ALL

## 2023-06-09 SDOH — ECONOMIC STABILITY: HOUSING INSECURITY
IN THE LAST 12 MONTHS, WAS THERE A TIME WHEN YOU DID NOT HAVE A STEADY PLACE TO SLEEP OR SLEPT IN A SHELTER (INCLUDING NOW)?: NO

## 2023-06-09 ASSESSMENT — ENCOUNTER SYMPTOMS
RESPIRATORY NEGATIVE: 1
EYES NEGATIVE: 1
GASTROINTESTINAL NEGATIVE: 1
ALLERGIC/IMMUNOLOGIC NEGATIVE: 1

## 2023-06-09 NOTE — PROGRESS NOTES
Chief Complaint   Patient presents with    Follow-up Chronic Condition       /78 (Site: Left Upper Arm, Position: Sitting)   Pulse 73   Resp 16   Ht 5' 3\" (1.6 m)   Wt 206 lb (93.4 kg)   SpO2 98%   BMI 36.49 kg/m²       1. \"Have you been to the ER, urgent care clinic since your last visit? Hospitalized since your last visit? \" No    2. \"Have you seen or consulted any other health care providers outside of the [address] [place] [address] since your last visit? \" No     3. For patients aged 39-70: Has the patient had a colonoscopy / FIT/ Cologuard? Yes - Care Gap present. Most recent result on file      If the patient is female:    4. For patients aged 41-77: Has the patient had a mammogram within the past 2 years? Yes - Care Gap present. Most recent result on file      5. For patients aged 21-65: Has the patient had a pap smear?  NA - based on age or sex
Physical Exam  Vitals and nursing note reviewed. Constitutional:       General: She is not in acute distress. Appearance: Normal appearance. She is not ill-appearing. Eyes:      Pupils: Pupils are equal, round, and reactive to light. Cardiovascular:      Rate and Rhythm: Regular rhythm. Pulses: Normal pulses. Heart sounds: Normal heart sounds. Pulmonary:      Effort: Pulmonary effort is normal.      Breath sounds: No rhonchi or rales. Abdominal:      General: Bowel sounds are normal. There is no distension. Tenderness: There is no abdominal tenderness. There is no guarding. Musculoskeletal:         General: No swelling or tenderness. Cervical back: Neck supple. Right lower leg: No edema. Left lower leg: No edema. Skin:     Findings: No bruising. Neurological:      General: No focal deficit present. Mental Status: She is alert and oriented to person, place, and time. Mental status is at baseline. Cranial Nerves: No cranial nerve deficit. Motor: No weakness. Gait: Gait normal.   Psychiatric:         Mood and Affect: Mood normal.         Behavior: Behavior normal.       An electronic signature was used to authenticate this note.   -- Aditya Castillo MD

## 2023-06-27 RX ORDER — ALBUTEROL SULFATE 2.5 MG/3ML
SOLUTION RESPIRATORY (INHALATION)
Qty: 360 ML | Refills: 3 | Status: SHIPPED | OUTPATIENT
Start: 2023-06-27

## 2023-07-13 RX ORDER — LEVOTHYROXINE SODIUM 0.05 MG/1
TABLET ORAL
Qty: 90 TABLET | Refills: 1 | Status: SHIPPED | OUTPATIENT
Start: 2023-07-13

## 2023-09-27 ENCOUNTER — TELEPHONE (OUTPATIENT)
Facility: CLINIC | Age: 73
End: 2023-09-27

## 2023-09-27 NOTE — TELEPHONE ENCOUNTER
[facility] sent message. They need an order for Nebulizer machine and parts. Patients machine is not working well.

## 2023-10-02 ENCOUNTER — TELEPHONE (OUTPATIENT)
Facility: CLINIC | Age: 73
End: 2023-10-02

## 2023-10-02 NOTE — TELEPHONE ENCOUNTER
----- Message from March Moder sent at 9/15/2023 11:58 AM EDT -----  Subject: Medication Problem    Medication: ipratropium 0.5 mg-albuterol 2.5 mg (DUONEB) 0.5-2.5 (3)   MG/3ML SOLN nebulizer solution  Dosage: 2.5 MG  Ordering Provider: shara    Question/Problem: Patient stated that [facility] has faxed Prior   Authorization request regarding and she would like to know the status. Please call with an update regarding. Thank you.        Pharmacy: [address] [place] [address] [phone] Roberta Hope [phone]    ---------------------------------------------------------------------------  --------------  Geraldine JONES  [phone]; OK to leave message on voicemail  ---------------------------------------------------------------------------  --------------    SCRIPT ANSWERS  Relationship to Patient: Self

## 2023-11-13 RX ORDER — OMEPRAZOLE 40 MG/1
CAPSULE, DELAYED RELEASE ORAL
Qty: 180 CAPSULE | Refills: 1 | Status: SHIPPED | OUTPATIENT
Start: 2023-11-13

## 2023-11-16 ENCOUNTER — APPOINTMENT (OUTPATIENT)
Facility: HOSPITAL | Age: 73
End: 2023-11-16
Payer: MEDICARE

## 2023-11-16 ENCOUNTER — OFFICE VISIT (OUTPATIENT)
Facility: CLINIC | Age: 73
End: 2023-11-16
Payer: MEDICARE

## 2023-11-16 ENCOUNTER — HOSPITAL ENCOUNTER (OUTPATIENT)
Facility: HOSPITAL | Age: 73
Setting detail: OBSERVATION
Discharge: HOME | End: 2023-11-17
Attending: EMERGENCY MEDICINE | Admitting: INTERNAL MEDICINE
Payer: MEDICARE

## 2023-11-16 ENCOUNTER — APPOINTMENT (OUTPATIENT)
Facility: HOSPITAL | Age: 73
End: 2023-11-16
Attending: EMERGENCY MEDICINE
Payer: MEDICARE

## 2023-11-16 VITALS
OXYGEN SATURATION: 96 % | DIASTOLIC BLOOD PRESSURE: 90 MMHG | BODY MASS INDEX: 38.27 KG/M2 | HEIGHT: 63 IN | SYSTOLIC BLOOD PRESSURE: 160 MMHG | HEART RATE: 62 BPM | TEMPERATURE: 98.1 F | WEIGHT: 216 LBS

## 2023-11-16 DIAGNOSIS — F41.9 ANXIETY: ICD-10-CM

## 2023-11-16 DIAGNOSIS — M79.89 SWELLING OF BOTH LOWER EXTREMITIES: ICD-10-CM

## 2023-11-16 DIAGNOSIS — R63.5 WEIGHT GAIN: ICD-10-CM

## 2023-11-16 DIAGNOSIS — R06.02 SHORTNESS OF BREATH: ICD-10-CM

## 2023-11-16 DIAGNOSIS — L29.9 CHRONIC PRURITUS: ICD-10-CM

## 2023-11-16 DIAGNOSIS — J44.9 CHRONIC OBSTRUCTIVE PULMONARY DISEASE, UNSPECIFIED COPD TYPE (HCC): ICD-10-CM

## 2023-11-16 DIAGNOSIS — J44.9 CHRONIC OBSTRUCTIVE PULMONARY DISEASE, UNSPECIFIED COPD TYPE (HCC): Primary | ICD-10-CM

## 2023-11-16 DIAGNOSIS — M05.9 RHEUMATOID ARTHRITIS WITH POSITIVE RHEUMATOID FACTOR, INVOLVING UNSPECIFIED SITE (HCC): ICD-10-CM

## 2023-11-16 DIAGNOSIS — I51.7 CARDIOMEGALY: ICD-10-CM

## 2023-11-16 DIAGNOSIS — K21.9 GASTROESOPHAGEAL REFLUX DISEASE WITHOUT ESOPHAGITIS: ICD-10-CM

## 2023-11-16 DIAGNOSIS — E03.9 HYPOTHYROIDISM, UNSPECIFIED TYPE: ICD-10-CM

## 2023-11-16 DIAGNOSIS — I10 ESSENTIAL HYPERTENSION: ICD-10-CM

## 2023-11-16 DIAGNOSIS — M79.7 FIBROMYALGIA: ICD-10-CM

## 2023-11-16 DIAGNOSIS — F51.04 PSYCHOPHYSIOLOGICAL INSOMNIA: ICD-10-CM

## 2023-11-16 PROBLEM — I42.9 CARDIOMYOPATHY (HCC): Status: ACTIVE | Noted: 2023-11-16

## 2023-11-16 LAB
ALBUMIN SERPL-MCNC: 3.6 G/DL (ref 3.5–5)
ALBUMIN/GLOB SERPL: 1.2 (ref 1.1–2.2)
ALP SERPL-CCNC: 74 U/L (ref 45–117)
ALT SERPL-CCNC: 36 U/L (ref 12–78)
ANION GAP SERPL CALC-SCNC: 5 MMOL/L (ref 5–15)
APPEARANCE UR: CLEAR
AST SERPL W P-5'-P-CCNC: 22 U/L (ref 15–37)
BACTERIA URNS QL MICRO: NEGATIVE /HPF
BASOPHILS # BLD: 0.1 K/UL (ref 0–0.1)
BASOPHILS NFR BLD: 1 % (ref 0–1)
BILIRUB SERPL-MCNC: 0.5 MG/DL (ref 0.2–1)
BILIRUB UR QL: NEGATIVE
BNP SERPL-MCNC: 232 PG/ML
BUN SERPL-MCNC: 9 MG/DL (ref 6–20)
BUN/CREAT SERPL: 11 (ref 12–20)
CA-I BLD-MCNC: 8.5 MG/DL (ref 8.5–10.1)
CHLORIDE SERPL-SCNC: 108 MMOL/L (ref 97–108)
CO2 SERPL-SCNC: 28 MMOL/L (ref 21–32)
COLOR UR: ABNORMAL
CREAT SERPL-MCNC: 0.8 MG/DL (ref 0.55–1.02)
DIFFERENTIAL METHOD BLD: NORMAL
ECHO BSA: 2.09 M2
EOSINOPHIL # BLD: 0.1 K/UL (ref 0–0.4)
EOSINOPHIL NFR BLD: 2 % (ref 0–7)
EPITH CASTS URNS QL MICRO: ABNORMAL /LPF
ERYTHROCYTE [DISTWIDTH] IN BLOOD BY AUTOMATED COUNT: 14.5 % (ref 11.5–14.5)
GLOBULIN SER CALC-MCNC: 3 G/DL (ref 2–4)
GLUCOSE SERPL-MCNC: 87 MG/DL (ref 65–100)
GLUCOSE UR STRIP.AUTO-MCNC: NEGATIVE MG/DL
HCT VFR BLD AUTO: 39.4 % (ref 35–47)
HGB BLD-MCNC: 13.2 G/DL (ref 11.5–16)
HGB UR QL STRIP: NEGATIVE
IMM GRANULOCYTES # BLD AUTO: 0 K/UL (ref 0–0.04)
IMM GRANULOCYTES NFR BLD AUTO: 0 % (ref 0–0.5)
KETONES UR QL STRIP.AUTO: NEGATIVE MG/DL
LEUKOCYTE ESTERASE UR QL STRIP.AUTO: ABNORMAL
LYMPHOCYTES # BLD: 1.8 K/UL (ref 0.8–3.5)
LYMPHOCYTES NFR BLD: 33 % (ref 12–49)
MCH RBC QN AUTO: 30 PG (ref 26–34)
MCHC RBC AUTO-ENTMCNC: 33.5 G/DL (ref 30–36.5)
MCV RBC AUTO: 89.5 FL (ref 80–99)
MONOCYTES # BLD: 0.5 K/UL (ref 0–1)
MONOCYTES NFR BLD: 9 % (ref 5–13)
NEUTS SEG # BLD: 3 K/UL (ref 1.8–8)
NEUTS SEG NFR BLD: 55 % (ref 32–75)
NITRITE UR QL STRIP.AUTO: NEGATIVE
NRBC # BLD: 0 K/UL (ref 0–0.01)
NRBC BLD-RTO: 0 PER 100 WBC
PH UR STRIP: 7 (ref 5–8)
PLATELET # BLD AUTO: 293 K/UL (ref 150–400)
PMV BLD AUTO: 9.5 FL (ref 8.9–12.9)
POTASSIUM SERPL-SCNC: 4.2 MMOL/L (ref 3.5–5.1)
PROT SERPL-MCNC: 6.6 G/DL (ref 6.4–8.2)
PROT UR STRIP-MCNC: NEGATIVE MG/DL
RBC # BLD AUTO: 4.4 M/UL (ref 3.8–5.2)
RBC #/AREA URNS HPF: ABNORMAL /HPF (ref 0–5)
SODIUM SERPL-SCNC: 141 MMOL/L (ref 136–145)
SP GR UR REFRACTOMETRY: 1.01 (ref 1–1.03)
TROPONIN I SERPL HS-MCNC: 32 NG/L (ref 0–51)
TSH SERPL DL<=0.05 MIU/L-ACNC: 1.92 UIU/ML (ref 0.36–3.74)
URINE CULTURE IF INDICATED: ABNORMAL
UROBILINOGEN UR QL STRIP.AUTO: 0.1 EU/DL (ref 0.1–1)
WBC # BLD AUTO: 5.5 K/UL (ref 3.6–11)
WBC URNS QL MICRO: ABNORMAL /HPF (ref 0–4)

## 2023-11-16 PROCEDURE — 36415 COLL VENOUS BLD VENIPUNCTURE: CPT

## 2023-11-16 PROCEDURE — 93005 ELECTROCARDIOGRAM TRACING: CPT | Performed by: EMERGENCY MEDICINE

## 2023-11-16 PROCEDURE — G8400 PT W/DXA NO RESULTS DOC: HCPCS | Performed by: INTERNAL MEDICINE

## 2023-11-16 PROCEDURE — 71275 CT ANGIOGRAPHY CHEST: CPT

## 2023-11-16 PROCEDURE — 83880 ASSAY OF NATRIURETIC PEPTIDE: CPT

## 2023-11-16 PROCEDURE — G8484 FLU IMMUNIZE NO ADMIN: HCPCS | Performed by: INTERNAL MEDICINE

## 2023-11-16 PROCEDURE — 1090F PRES/ABSN URINE INCON ASSESS: CPT | Performed by: INTERNAL MEDICINE

## 2023-11-16 PROCEDURE — 81001 URINALYSIS AUTO W/SCOPE: CPT

## 2023-11-16 PROCEDURE — 3077F SYST BP >= 140 MM HG: CPT | Performed by: INTERNAL MEDICINE

## 2023-11-16 PROCEDURE — 71045 X-RAY EXAM CHEST 1 VIEW: CPT

## 2023-11-16 PROCEDURE — 99285 EMERGENCY DEPT VISIT HI MDM: CPT

## 2023-11-16 PROCEDURE — 3078F DIAST BP <80 MM HG: CPT | Performed by: INTERNAL MEDICINE

## 2023-11-16 PROCEDURE — 80053 COMPREHEN METABOLIC PANEL: CPT

## 2023-11-16 PROCEDURE — 93970 EXTREMITY STUDY: CPT

## 2023-11-16 PROCEDURE — 1036F TOBACCO NON-USER: CPT | Performed by: INTERNAL MEDICINE

## 2023-11-16 PROCEDURE — 6360000004 HC RX CONTRAST MEDICATION: Performed by: EMERGENCY MEDICINE

## 2023-11-16 PROCEDURE — 99214 OFFICE O/P EST MOD 30 MIN: CPT | Performed by: INTERNAL MEDICINE

## 2023-11-16 PROCEDURE — 1123F ACP DISCUSS/DSCN MKR DOCD: CPT | Performed by: INTERNAL MEDICINE

## 2023-11-16 PROCEDURE — 84443 ASSAY THYROID STIM HORMONE: CPT

## 2023-11-16 PROCEDURE — 85025 COMPLETE CBC W/AUTO DIFF WBC: CPT

## 2023-11-16 PROCEDURE — G8427 DOCREV CUR MEDS BY ELIG CLIN: HCPCS | Performed by: INTERNAL MEDICINE

## 2023-11-16 PROCEDURE — 84484 ASSAY OF TROPONIN QUANT: CPT

## 2023-11-16 PROCEDURE — G8417 CALC BMI ABV UP PARAM F/U: HCPCS | Performed by: INTERNAL MEDICINE

## 2023-11-16 PROCEDURE — G0378 HOSPITAL OBSERVATION PER HR: HCPCS

## 2023-11-16 PROCEDURE — 3017F COLORECTAL CA SCREEN DOC REV: CPT | Performed by: INTERNAL MEDICINE

## 2023-11-16 PROCEDURE — 6370000000 HC RX 637 (ALT 250 FOR IP): Performed by: INTERNAL MEDICINE

## 2023-11-16 PROCEDURE — 96374 THER/PROPH/DIAG INJ IV PUSH: CPT

## 2023-11-16 PROCEDURE — 3023F SPIROM DOC REV: CPT | Performed by: INTERNAL MEDICINE

## 2023-11-16 PROCEDURE — 6360000002 HC RX W HCPCS: Performed by: EMERGENCY MEDICINE

## 2023-11-16 PROCEDURE — 2580000003 HC RX 258: Performed by: INTERNAL MEDICINE

## 2023-11-16 RX ORDER — HYDROXYZINE HYDROCHLORIDE 25 MG/1
25 TABLET, FILM COATED ORAL EVERY EVENING
Status: DISCONTINUED | OUTPATIENT
Start: 2023-11-16 | End: 2023-11-17 | Stop reason: HOSPADM

## 2023-11-16 RX ORDER — TELMISARTAN AND HYDROCHLORTHIAZIDE 40; 12.5 MG/1; MG/1
1 TABLET ORAL DAILY
Status: DISCONTINUED | OUTPATIENT
Start: 2023-11-17 | End: 2023-11-17

## 2023-11-16 RX ORDER — OMEPRAZOLE 40 MG/1
CAPSULE, DELAYED RELEASE ORAL
Qty: 180 CAPSULE | Refills: 1 | Status: CANCELLED | OUTPATIENT
Start: 2023-11-16

## 2023-11-16 RX ORDER — SODIUM CHLORIDE 0.9 % (FLUSH) 0.9 %
5-40 SYRINGE (ML) INJECTION PRN
Status: DISCONTINUED | OUTPATIENT
Start: 2023-11-16 | End: 2023-11-17 | Stop reason: HOSPADM

## 2023-11-16 RX ORDER — POLYETHYLENE GLYCOL 3350 17 G/17G
17 POWDER, FOR SOLUTION ORAL DAILY PRN
Status: DISCONTINUED | OUTPATIENT
Start: 2023-11-16 | End: 2023-11-17 | Stop reason: HOSPADM

## 2023-11-16 RX ORDER — ONDANSETRON 4 MG/1
4 TABLET, ORALLY DISINTEGRATING ORAL EVERY 8 HOURS PRN
Status: DISCONTINUED | OUTPATIENT
Start: 2023-11-16 | End: 2023-11-17 | Stop reason: HOSPADM

## 2023-11-16 RX ORDER — SODIUM CHLORIDE 9 MG/ML
INJECTION, SOLUTION INTRAVENOUS PRN
Status: DISCONTINUED | OUTPATIENT
Start: 2023-11-16 | End: 2023-11-17 | Stop reason: HOSPADM

## 2023-11-16 RX ORDER — POTASSIUM CHLORIDE 20 MEQ/1
40 TABLET, EXTENDED RELEASE ORAL PRN
Status: DISCONTINUED | OUTPATIENT
Start: 2023-11-16 | End: 2023-11-17 | Stop reason: HOSPADM

## 2023-11-16 RX ORDER — PREGABALIN 150 MG/1
150 CAPSULE ORAL 2 TIMES DAILY
Status: DISCONTINUED | OUTPATIENT
Start: 2023-11-16 | End: 2023-11-17 | Stop reason: HOSPADM

## 2023-11-16 RX ORDER — MAGNESIUM SULFATE IN WATER 40 MG/ML
2000 INJECTION, SOLUTION INTRAVENOUS PRN
Status: DISCONTINUED | OUTPATIENT
Start: 2023-11-16 | End: 2023-11-17 | Stop reason: HOSPADM

## 2023-11-16 RX ORDER — FUROSEMIDE 10 MG/ML
20 INJECTION INTRAMUSCULAR; INTRAVENOUS
Status: COMPLETED | OUTPATIENT
Start: 2023-11-16 | End: 2023-11-16

## 2023-11-16 RX ORDER — ENOXAPARIN SODIUM 100 MG/ML
40 INJECTION SUBCUTANEOUS DAILY
Status: DISCONTINUED | OUTPATIENT
Start: 2023-11-17 | End: 2023-11-17 | Stop reason: HOSPADM

## 2023-11-16 RX ORDER — ACETAMINOPHEN 650 MG/1
650 SUPPOSITORY RECTAL EVERY 6 HOURS PRN
Status: DISCONTINUED | OUTPATIENT
Start: 2023-11-16 | End: 2023-11-17 | Stop reason: HOSPADM

## 2023-11-16 RX ORDER — BUDESONIDE AND FORMOTEROL FUMARATE DIHYDRATE 160; 4.5 UG/1; UG/1
2 AEROSOL RESPIRATORY (INHALATION)
Status: DISCONTINUED | OUTPATIENT
Start: 2023-11-16 | End: 2023-11-17 | Stop reason: HOSPADM

## 2023-11-16 RX ORDER — SODIUM CHLORIDE 0.9 % (FLUSH) 0.9 %
5-40 SYRINGE (ML) INJECTION EVERY 12 HOURS SCHEDULED
Status: DISCONTINUED | OUTPATIENT
Start: 2023-11-16 | End: 2023-11-17 | Stop reason: HOSPADM

## 2023-11-16 RX ORDER — FOLIC ACID 1 MG/1
1 TABLET ORAL DAILY
Status: DISCONTINUED | OUTPATIENT
Start: 2023-11-17 | End: 2023-11-17 | Stop reason: HOSPADM

## 2023-11-16 RX ORDER — TRAZODONE HYDROCHLORIDE 50 MG/1
50 TABLET ORAL NIGHTLY
Status: DISCONTINUED | OUTPATIENT
Start: 2023-11-16 | End: 2023-11-17 | Stop reason: HOSPADM

## 2023-11-16 RX ORDER — ACETAMINOPHEN 325 MG/1
650 TABLET ORAL EVERY 6 HOURS PRN
Status: DISCONTINUED | OUTPATIENT
Start: 2023-11-16 | End: 2023-11-17 | Stop reason: HOSPADM

## 2023-11-16 RX ORDER — TELMISARTAN AND HYDROCHLORTHIAZIDE 40; 12.5 MG/1; MG/1
1 TABLET ORAL EVERY MORNING
Qty: 90 TABLET | Refills: 2 | Status: CANCELLED | OUTPATIENT
Start: 2023-11-16

## 2023-11-16 RX ORDER — PANTOPRAZOLE SODIUM 40 MG/1
40 TABLET, DELAYED RELEASE ORAL
Status: DISCONTINUED | OUTPATIENT
Start: 2023-11-17 | End: 2023-11-17 | Stop reason: HOSPADM

## 2023-11-16 RX ORDER — LEVOTHYROXINE SODIUM 0.1 MG/1
50 TABLET ORAL DAILY
Status: DISCONTINUED | OUTPATIENT
Start: 2023-11-17 | End: 2023-11-17 | Stop reason: HOSPADM

## 2023-11-16 RX ORDER — HYDROXYZINE HYDROCHLORIDE 25 MG/1
TABLET, FILM COATED ORAL
Qty: 180 TABLET | Refills: 1 | Status: CANCELLED | OUTPATIENT
Start: 2023-11-16

## 2023-11-16 RX ORDER — HYDROXYZINE HYDROCHLORIDE 25 MG/1
25 TABLET, FILM COATED ORAL 2 TIMES DAILY PRN
Status: DISCONTINUED | OUTPATIENT
Start: 2023-11-16 | End: 2023-11-16

## 2023-11-16 RX ORDER — IPRATROPIUM BROMIDE AND ALBUTEROL SULFATE 2.5; .5 MG/3ML; MG/3ML
1 SOLUTION RESPIRATORY (INHALATION) EVERY 4 HOURS PRN
Status: DISCONTINUED | OUTPATIENT
Start: 2023-11-16 | End: 2023-11-17 | Stop reason: HOSPADM

## 2023-11-16 RX ORDER — POTASSIUM CHLORIDE 7.45 MG/ML
10 INJECTION INTRAVENOUS PRN
Status: DISCONTINUED | OUTPATIENT
Start: 2023-11-16 | End: 2023-11-17 | Stop reason: HOSPADM

## 2023-11-16 RX ORDER — ONDANSETRON 2 MG/ML
4 INJECTION INTRAMUSCULAR; INTRAVENOUS EVERY 6 HOURS PRN
Status: DISCONTINUED | OUTPATIENT
Start: 2023-11-16 | End: 2023-11-17 | Stop reason: HOSPADM

## 2023-11-16 RX ORDER — FUROSEMIDE 40 MG/1
20 TABLET ORAL DAILY
Status: DISCONTINUED | OUTPATIENT
Start: 2023-11-17 | End: 2023-11-17 | Stop reason: HOSPADM

## 2023-11-16 RX ADMIN — SODIUM CHLORIDE, PRESERVATIVE FREE 10 ML: 5 INJECTION INTRAVENOUS at 22:57

## 2023-11-16 RX ADMIN — FUROSEMIDE 20 MG: 10 INJECTION, SOLUTION INTRAMUSCULAR; INTRAVENOUS at 17:20

## 2023-11-16 RX ADMIN — HYDROXYZINE HYDROCHLORIDE 25 MG: 25 TABLET, FILM COATED ORAL at 22:56

## 2023-11-16 RX ADMIN — TRAZODONE HYDROCHLORIDE 50 MG: 50 TABLET ORAL at 22:56

## 2023-11-16 RX ADMIN — IOPAMIDOL 100 ML: 755 INJECTION, SOLUTION INTRAVENOUS at 18:07

## 2023-11-16 RX ADMIN — PREGABALIN 150 MG: 150 CAPSULE ORAL at 22:56

## 2023-11-16 ASSESSMENT — ENCOUNTER SYMPTOMS
ALLERGIC/IMMUNOLOGIC NEGATIVE: 1
CHEST TIGHTNESS: 1
GASTROINTESTINAL NEGATIVE: 1
SHORTNESS OF BREATH: 1

## 2023-11-16 ASSESSMENT — LIFESTYLE VARIABLES
HOW MANY STANDARD DRINKS CONTAINING ALCOHOL DO YOU HAVE ON A TYPICAL DAY: PATIENT DOES NOT DRINK
HOW OFTEN DO YOU HAVE A DRINK CONTAINING ALCOHOL: NEVER

## 2023-11-16 ASSESSMENT — PAIN SCALES - GENERAL: PAINLEVEL_OUTOF10: 7

## 2023-11-16 ASSESSMENT — PAIN - FUNCTIONAL ASSESSMENT: PAIN_FUNCTIONAL_ASSESSMENT: 0-10

## 2023-11-16 NOTE — ED PROVIDER NOTES
[facility] EMERGENCY DEPT  EMERGENCY DEPARTMENT HISTORY AND PHYSICAL EXAM      Date: 11/16/2023  Patient Name: Norah Navarrete  MRN: [id number]  [facility] [facility] 1950  Date of evaluation: 11/16/2023  Provider: Carlton Cardoza DO   Note Started: 2:42 PM EST 11/16/23    HISTORY OF PRESENT ILLNESS     Chief Complaint   Patient presents with    Leg Swelling       History Provided By: Patient    HPI: Norah Navarrete is a 68 y.o. female with past medical history significant for the below presenting to the emergency department for evaluation of bilateral lower extremity edema, shortness of breath. Patient reports symptoms have been worsening over the course of the last couple of months. Denies history of heart failure, chest pain, history of DVT/PE. Has not been previously diagnosed with heart failure.     PAST MEDICAL HISTORY   Past Medical History:  Past Medical History:   Diagnosis Date    Acquired hypothyroidism 7/16/2020    Anxiety 7/16/2020    Asthma     Chronic obstructive lung disease ([address]) 7/16/2020    Cigarette smoker 7/16/2020    Depression     Dyslipidemia 7/16/2020    Essential hypertension, malignant 7/16/2020    Fibromyalgia 7/16/2020    Gastroesophageal reflux disease 7/16/2020    Insomnia 7/16/2020    Nicotine dependence 7/16/2020    Nonspecific abnormal electrocardiogram (ECG) (EKG) 7/16/2020    Obesity, unspecified 7/16/2020    Osteoarthritis     Preseptal cellulitis 7/16/2020    Pruritic rash 7/16/2020    Pure hypercholesterolemia 7/16/2020    Sleep disorder     Wheezing 7/16/2020       Past Surgical History:  Past Surgical History:   Procedure Laterality Date    GYN  12/12/1994    HYSTERECTOMY, TOTAL ABDOMINAL (CERVIX REMOVED)  1995    IR FNA WITH IMAGING  7/5/2022    TONSILLECTOMY  09/12/2002       Family History:  Family History   Problem Relation Age of Onset    Hypertension Father     Cancer Mother         lung       Social History:  Social History     Tobacco Use    Smoking status: Former potassium chloride 10 mEq/100 mL IVPB (Peripheral Line) (has no administration in time range)   magnesium sulfate 2000 mg in 50 mL IVPB premix (has no administration in time range)   enoxaparin (LOVENOX) injection 40 mg (has no administration in time range)   ondansetron (ZOFRAN-ODT) disintegrating tablet 4 mg (has no administration in time range)     Or   ondansetron (ZOFRAN) injection 4 mg (has no administration in time range)   polyethylene glycol (GLYCOLAX) packet 17 g (has no administration in time range)   acetaminophen (TYLENOL) tablet 650 mg (has no administration in time range)     Or   acetaminophen (TYLENOL) suppository 650 mg (has no administration in time range)   furosemide (LASIX) tablet 20 mg (has no administration in time range)   budesonide-formoterol (SYMBICORT) 160-4.5 MCG/ACT inhaler 2 puff (has no administration in time range)   hydrOXYzine HCl (ATARAX) tablet 25 mg (has no administration in time range)   furosemide (LASIX) injection 20 mg (20 mg IntraVENous Given 11/16/23 1720)   iopamidol (ISOVUE-370) 76 % injection 100 mL (100 mLs IntraVENous Given 11/16/23 1807)       CONSULTS: (Who and What was discussed)  IP CONSULT TO HOSPITALIST  IP CONSULT TO PULMONOLOGY     Social Determinants affecting Dx or Tx: None    Smoking Cessation: Not Applicable    PROCEDURES   Unless otherwise noted above, none. Procedures      CRITICAL CARE TIME   Patient does not meet Critical Care Time, 0 minutes    FINAL IMPRESSION     1. Chronic obstructive pulmonary disease, unspecified COPD type ([address])    2. Cardiomegaly    3. Shortness of breath          DISPOSITION/PLAN   DISPOSITION Admitted 11/16/2023 08:16:40 PM    Admit Note: Pt is being admitted by Harlan Padilla. The results of their tests and reason(s) for their admission have been discussed with pt and/or available family. They convey agreement and understanding for the need to be admitted and for the admission diagnosis.      PATIENT REFERRED TO:  No follow-up

## 2023-11-16 NOTE — ED TRIAGE NOTES
Pt complaining of leg swelling for the past couple days. Pt states it has increase gotten worst and does get worst as the day goes on. Pt states no hx of CHF.

## 2023-11-16 NOTE — PROGRESS NOTES
Angeli Morris (: 1950) is a 68 y.o. female, Established patient patient, here for evaluation of the following chief complaint(s):  Follow-up Chronic Condition         ASSESSMENT/PLAN:  Below is the assessment and plan developed based on review of pertinent history, physical exam, labs, studies, and medications. 1. Swelling of both lower extremities  2. Weight gain  3. Essential hypertension  4. Chronic obstructive pulmonary disease, unspecified COPD type ([address])  5. Shortness of breath  6. Hypothyroidism, unspecified type  7. Fibromyalgia  8. Rheumatoid arthritis with positive rheumatoid factor, involving unspecified site ([address])  9. Gastroesophageal reflux disease without esophagitis  10. Anxiety  11. Psychophysiological insomnia  12. Chronic pruritus      Ms. Euceda is scheduled for the same-day visit after telephone call with my nurse. She has worsening of shortness of breath, gaining weight and worsening swelling of lower legs. Her blood pressure is elevated. She has underlying COPD. She has rheumatoid arthritis getting methotrexate and follows rheumatologist Dr. Kala Ramirez. Also getting pregabalin and hydroxyzine. She gets pregabalin for her fibromyalgia and hydroxyzine and trazodone for insomnia and chronic itching and probably anxiety. She has COPD from history of smoking. She has quit smoking cigarette. She takes inhalers and nebulizer machine. For her combinations of multiple serious symptoms including shortness of breath, PND, not catching breath at night while lying down, chest tightness, worsening swelling of legs, face looks puffy with high blood pressure differential diagnosis explained to rule out serious cardiac etiology and other etiologies refer her to ER where she can be checked up for BNP EKG and troponin.     One of the possibility in differential diagnosis is that it seems like she is not taking telmisartan HCTZ which was prescribed in 2023 for 9 months the

## 2023-11-17 ENCOUNTER — APPOINTMENT (OUTPATIENT)
Facility: HOSPITAL | Age: 73
End: 2023-11-17
Payer: MEDICARE

## 2023-11-17 VITALS
RESPIRATION RATE: 18 BRPM | BODY MASS INDEX: 38.27 KG/M2 | OXYGEN SATURATION: 97 % | DIASTOLIC BLOOD PRESSURE: 67 MMHG | WEIGHT: 216 LBS | SYSTOLIC BLOOD PRESSURE: 124 MMHG | HEIGHT: 63 IN | HEART RATE: 87 BPM | TEMPERATURE: 98.2 F

## 2023-11-17 LAB
ANION GAP SERPL CALC-SCNC: 5 MMOL/L (ref 5–15)
BASOPHILS # BLD: 0.1 K/UL (ref 0–0.1)
BASOPHILS NFR BLD: 1 % (ref 0–1)
BUN SERPL-MCNC: 12 MG/DL (ref 6–20)
BUN/CREAT SERPL: 18 (ref 12–20)
CA-I BLD-MCNC: 8.7 MG/DL (ref 8.5–10.1)
CHLORIDE SERPL-SCNC: 104 MMOL/L (ref 97–108)
CO2 SERPL-SCNC: 30 MMOL/L (ref 21–32)
CREAT SERPL-MCNC: 0.67 MG/DL (ref 0.55–1.02)
DIFFERENTIAL METHOD BLD: NORMAL
EKG ATRIAL RATE: 75 BPM
EKG DIAGNOSIS: NORMAL
EKG P AXIS: 8 DEGREES
EKG P-R INTERVAL: 148 MS
EKG Q-T INTERVAL: 412 MS
EKG QRS DURATION: 70 MS
EKG QTC CALCULATION (BAZETT): 460 MS
EKG R AXIS: 39 DEGREES
EKG T AXIS: 36 DEGREES
EKG VENTRICULAR RATE: 75 BPM
EOSINOPHIL # BLD: 0.1 K/UL (ref 0–0.4)
EOSINOPHIL NFR BLD: 2 % (ref 0–7)
ERYTHROCYTE [DISTWIDTH] IN BLOOD BY AUTOMATED COUNT: 14.5 % (ref 11.5–14.5)
FLUAV RNA SPEC QL NAA+PROBE: NOT DETECTED
FLUBV RNA SPEC QL NAA+PROBE: NOT DETECTED
GLUCOSE SERPL-MCNC: 100 MG/DL (ref 65–100)
HCT VFR BLD AUTO: 40.1 % (ref 35–47)
HGB BLD-MCNC: 13.5 G/DL (ref 11.5–16)
IMM GRANULOCYTES # BLD AUTO: 0 K/UL (ref 0–0.04)
IMM GRANULOCYTES NFR BLD AUTO: 0 % (ref 0–0.5)
LYMPHOCYTES # BLD: 1.6 K/UL (ref 0.8–3.5)
LYMPHOCYTES NFR BLD: 21 % (ref 12–49)
M PNEUMO IGM SER IA-ACNC: NONREACTIVE
MCH RBC QN AUTO: 30.1 PG (ref 26–34)
MCHC RBC AUTO-ENTMCNC: 33.7 G/DL (ref 30–36.5)
MCV RBC AUTO: 89.3 FL (ref 80–99)
MONOCYTES # BLD: 0.5 K/UL (ref 0–1)
MONOCYTES NFR BLD: 7 % (ref 5–13)
NEUTS SEG # BLD: 5.2 K/UL (ref 1.8–8)
NEUTS SEG NFR BLD: 69 % (ref 32–75)
NRBC # BLD: 0 K/UL (ref 0–0.01)
NRBC BLD-RTO: 0 PER 100 WBC
PLATELET # BLD AUTO: 309 K/UL (ref 150–400)
PMV BLD AUTO: 10 FL (ref 8.9–12.9)
POTASSIUM SERPL-SCNC: NORMAL MMOL/L (ref 3.5–5.1)
RBC # BLD AUTO: 4.49 M/UL (ref 3.8–5.2)
SARS-COV-2 RNA RESP QL NAA+PROBE: NOT DETECTED
SODIUM SERPL-SCNC: 139 MMOL/L (ref 136–145)
WBC # BLD AUTO: 7.5 K/UL (ref 3.6–11)

## 2023-11-17 PROCEDURE — 87636 SARSCOV2 & INF A&B AMP PRB: CPT

## 2023-11-17 PROCEDURE — 6370000000 HC RX 637 (ALT 250 FOR IP): Performed by: INTERNAL MEDICINE

## 2023-11-17 PROCEDURE — 73610 X-RAY EXAM OF ANKLE: CPT

## 2023-11-17 PROCEDURE — 86738 MYCOPLASMA ANTIBODY: CPT

## 2023-11-17 PROCEDURE — 36415 COLL VENOUS BLD VENIPUNCTURE: CPT

## 2023-11-17 PROCEDURE — G0378 HOSPITAL OBSERVATION PER HR: HCPCS

## 2023-11-17 PROCEDURE — 80048 BASIC METABOLIC PNL TOTAL CA: CPT

## 2023-11-17 PROCEDURE — 96375 TX/PRO/DX INJ NEW DRUG ADDON: CPT

## 2023-11-17 PROCEDURE — 94640 AIRWAY INHALATION TREATMENT: CPT

## 2023-11-17 PROCEDURE — 6360000002 HC RX W HCPCS: Performed by: INTERNAL MEDICINE

## 2023-11-17 PROCEDURE — 97165 OT EVAL LOW COMPLEX 30 MIN: CPT

## 2023-11-17 PROCEDURE — 96372 THER/PROPH/DIAG INJ SC/IM: CPT

## 2023-11-17 PROCEDURE — 85025 COMPLETE CBC W/AUTO DIFF WBC: CPT

## 2023-11-17 RX ORDER — HYDROCHLOROTHIAZIDE 25 MG/1
12.5 TABLET ORAL DAILY
Status: DISCONTINUED | OUTPATIENT
Start: 2023-11-17 | End: 2023-11-17 | Stop reason: HOSPADM

## 2023-11-17 RX ORDER — BUDESONIDE AND FORMOTEROL FUMARATE DIHYDRATE 160; 4.5 UG/1; UG/1
2 AEROSOL RESPIRATORY (INHALATION)
Qty: 10.2 G | Refills: 3 | Status: SHIPPED | OUTPATIENT
Start: 2023-11-17

## 2023-11-17 RX ORDER — DOXYCYCLINE HYCLATE 100 MG/1
100 CAPSULE ORAL EVERY 12 HOURS SCHEDULED
Status: DISCONTINUED | OUTPATIENT
Start: 2023-11-17 | End: 2023-11-17 | Stop reason: HOSPADM

## 2023-11-17 RX ORDER — LOSARTAN POTASSIUM 50 MG/1
50 TABLET ORAL DAILY
Status: DISCONTINUED | OUTPATIENT
Start: 2023-11-17 | End: 2023-11-17 | Stop reason: HOSPADM

## 2023-11-17 RX ORDER — METHYLPREDNISOLONE SODIUM SUCCINATE 40 MG/ML
40 INJECTION, POWDER, LYOPHILIZED, FOR SOLUTION INTRAMUSCULAR; INTRAVENOUS EVERY 12 HOURS
Status: DISCONTINUED | OUTPATIENT
Start: 2023-11-17 | End: 2023-11-17 | Stop reason: HOSPADM

## 2023-11-17 RX ORDER — PREDNISONE 50 MG/1
50 TABLET ORAL DAILY
Qty: 5 TABLET | Refills: 0 | Status: SHIPPED | OUTPATIENT
Start: 2023-11-17 | End: 2023-11-22

## 2023-11-17 RX ORDER — IPRATROPIUM BROMIDE AND ALBUTEROL SULFATE 2.5; .5 MG/3ML; MG/3ML
1 SOLUTION RESPIRATORY (INHALATION)
Status: DISCONTINUED | OUTPATIENT
Start: 2023-11-17 | End: 2023-11-17 | Stop reason: HOSPADM

## 2023-11-17 RX ORDER — PREDNISONE 20 MG/1
20 TABLET ORAL DAILY
Status: DISCONTINUED | OUTPATIENT
Start: 2023-11-17 | End: 2023-11-17

## 2023-11-17 RX ORDER — DOXYCYCLINE HYCLATE 100 MG/1
100 CAPSULE ORAL EVERY 12 HOURS SCHEDULED
Qty: 9 CAPSULE | Refills: 0 | Status: SHIPPED | OUTPATIENT
Start: 2023-11-17 | End: 2023-11-22

## 2023-11-17 RX ORDER — TIOTROPIUM BROMIDE 18 UG/1
18 CAPSULE ORAL; RESPIRATORY (INHALATION) DAILY
Qty: 90 CAPSULE | Refills: 1 | Status: SHIPPED | OUTPATIENT
Start: 2023-11-17

## 2023-11-17 RX ADMIN — IPRATROPIUM BROMIDE AND ALBUTEROL SULFATE 1 DOSE: 2.5; .5 SOLUTION RESPIRATORY (INHALATION) at 10:18

## 2023-11-17 RX ADMIN — Medication 1 PUFF: at 10:18

## 2023-11-17 RX ADMIN — PANTOPRAZOLE SODIUM 40 MG: 40 TABLET, DELAYED RELEASE ORAL at 08:16

## 2023-11-17 RX ADMIN — PREGABALIN 150 MG: 150 CAPSULE ORAL at 08:15

## 2023-11-17 RX ADMIN — HYDROCHLOROTHIAZIDE 12.5 MG: 25 TABLET ORAL at 08:15

## 2023-11-17 RX ADMIN — FOLIC ACID 1 MG: 1 TABLET ORAL at 08:16

## 2023-11-17 RX ADMIN — FUROSEMIDE 20 MG: 40 TABLET ORAL at 08:16

## 2023-11-17 RX ADMIN — LOSARTAN POTASSIUM 50 MG: 50 TABLET, FILM COATED ORAL at 08:16

## 2023-11-17 RX ADMIN — PREDNISONE 20 MG: 20 TABLET ORAL at 08:16

## 2023-11-17 RX ADMIN — LEVOTHYROXINE SODIUM 50 MCG: 0.1 TABLET ORAL at 08:15

## 2023-11-17 RX ADMIN — METHYLPREDNISOLONE SODIUM SUCCINATE 40 MG: 40 INJECTION INTRAMUSCULAR; INTRAVENOUS at 11:25

## 2023-11-17 RX ADMIN — DOXYCYCLINE HYCLATE 100 MG: 100 CAPSULE ORAL at 11:25

## 2023-11-17 RX ADMIN — ENOXAPARIN SODIUM 40 MG: 100 INJECTION SUBCUTANEOUS at 08:16

## 2023-11-17 ASSESSMENT — PAIN SCALES - GENERAL
PAINLEVEL_OUTOF10: 0
PAINLEVEL_OUTOF10: 0

## 2023-11-17 ASSESSMENT — PAIN - FUNCTIONAL ASSESSMENT: PAIN_FUNCTIONAL_ASSESSMENT: NONE - DENIES PAIN

## 2023-11-17 NOTE — ED NOTES
Contacted rehab department. States they will come down to evaluate the patient shortly.       Kasandra Goodell, RN  11/17/23 5088

## 2023-11-17 NOTE — ED NOTES
Pt and Nurse walked around the ER 2 laps. SPO2 stayed above 95%. No c/o of SOB or rest periods needed.      Jason Monroy LPN  30/03/13 0210

## 2023-11-17 NOTE — DISCHARGE SUMMARY
Hospitalist Discharge Summary     Patient ID:  Randy Moreira  [id number]  40 y.o.  1950 11/16/2023    PCP on record: Caty Avendaño MD    Admit date: 11/16/2023  Discharge date and time: 11/17/2023    DISCHARGE DIAGNOSIS:    # COPD exacerbation, mild  - Discharge on abx, steroids, LAMA, LABA + ICS     # Hypoxia  - No desaturation on walk test  - resolved     # Hypothyroidism  - Continue home medications. PO levothyroxine   - TSH within normal limits      # Essential hypertension   - Continue home medications. CONSULTATIONS:  IP CONSULT TO HOSPITALIST  IP CONSULT TO PULMONOLOGY  IP CONSULT TO PHYSICAL THERAPY  IP CONSULT TO OCCUPATIONAL THERAPY    Excerpted HPI from H&P of Varsha Bingham MD:  Randy Moreira is a 68 y.o. female with PMH of COPD, and other medical problems as below. Presented to the ED with chief complaint of shortness of breath. She reports previous on home oxygen, however later managed to weaned off. However in the past few months, reports have been having more cough and wheezing. Also worsening dyspnea on exertion. In addition, she also report having b/l LE swelling. Her leg swelling typically worsens after a long day standing and improved after the night's sleep. In the ED, noted to be hypoxic requiring 2L NC. Lab within normal limits, no leukocytosis. Pro-BNP not elevated. CTA chest showed cardiomegaly but no pulmonary embolism or acute airspace disease.     ______________________________________________________________________  DISCHARGE SUMMARY/HOSPITAL COURSE:  for full details see H&P, daily progress notes, labs, consult notes. Patient presented to the hospital complaining of shortness of breath. Patient admitted and treated for COPD exacerbation. Patient did not desaturate on walk test.  Physical therapy and Occupational Therapy evaluated the patient. X-ray of ankle showed soft tissue swelling.   Doppler ultrasound ruled out deep vein

## 2023-11-17 NOTE — ED NOTES
Patient ready to be discharged. Patient waiting for clean clothing from daughter at this time.       Sheryl Horton RN  11/17/23 1275

## 2023-11-17 NOTE — CONSULTS
Pulmonology and Critical Care Consult    Subjective:   Consult Note: 11/17/2023 10:00 AM    Chief Complaint:   Chief Complaint   Patient presents with    Leg Swelling        This patient has been seen and evaluated at the request of Dr. Alejo Mayorga. Patient is a 78-year-old obese  female with a history of hypothyroidism, hypertension, GERD, COPD, and prior nicotine dependence who presented to the hospital with increasing lower extremity edema and shortness of breath. Patient seen and examined in her room in the emergency department this morning, states that she has been having increasing shortness of breath over the past week associated with weight gain and lower extremity edema as well as chest tightness and some wheezing on exertion. She states that she usually feels short of breath, but this has been worse over the past week. She was last seen by my partner, Dr. Michel Lima, in 2022 and we will move forward with trying to get her a hospital follow-up for management of her COPD. Apparently her FEV1 is in the 80s, but she is not on a maintenance inhaler, only utilizing rescue albuterol inhaler at home. UA with moderate leukocyte esterase and 0-4 white blood cells. CBC/BMP unremarkable, proBNP level 232, troponin negative x1, LFTs normal.  Lower extremity venous Dopplers negative for DVT, CTA chest personally reviewed and shows no PE or pneumonia, but does show emphysema and cardiomegaly. A TTE is currently pending and she states that she received Lasix 40 mg IV x1 in the ED. Her lower extremity edema is improving and her shortness of breath is also somewhat better but she still having chest tightness. She has poor air movement bilaterally, therefore I will start her on DuoNeb nebulizer treatments every 6 hours while awake, continue on Symbicort inhaler twice daily, and I will switch prednisone over to Solu-Medrol 40 mg IV every 12 hours for at least the next 24 hours.   Currently on 2 L nasal cannula, wean 24 to 48 hours    2.)  Acute exacerbation of COPD  -Suspect in the setting of poorly controlled COPD due to lack of pulmonary follow-up and not being on a maintenance inhaler.  -Agree with Symbicort 2 puffs twice daily, I will add DuoNeb nebulizers every 6 hours. Patient will need to be discharged with Symbicort/Spiriva.  -We will start Solu-Medrol 40 mg IV every 12 hours x24 hours, likely switch to prednisone tomorrow if she is off the oxygen. -Doxycycline 100 mg p.o. twice daily x5 days  -Needs close follow-up with Dr. Kathy Sykes after discharge. 3.)  Lower extremity edema  -Suspect fluid overload, possible chronic diastolic heart failure  -Improving with Lasix 40 mg IV x1 on admission, now on Lasix 20 mg p.o. daily  -Continue to monitor strict I's/O's  -TTE pending    4.)  Abnormal urinalysis  -UA showing moderate leuk esterase, but 0-4 white blood cells. Lower suspicion for UTI unless she is having symptoms.  -Currently on doxycycline, but this is more of a COPD antibiotic. 5.)  Ex-smoker  -Patient has a long history of smoking, but quit in 2022  -Significant encouragement given  -COPD management as above    6.)  Obesity  -BMI 38.26, due to excess calories  -Significant weight loss recommended, recommend outpatient [facility] evaluation      CODE STATUS: Full Code       Disposition and Family: Stable to transfer to floor.     Total time spent with patient: 50 minutes      Suzette Baumgarten, DO  [facility] ([facility])  11/17/2023  10:00 AM

## 2023-11-20 ENCOUNTER — TELEPHONE (OUTPATIENT)
Facility: CLINIC | Age: 73
End: 2023-11-20

## 2023-11-20 NOTE — TELEPHONE ENCOUNTER
.Care Transitions Initial Follow Up Call    Call within 2 business days of discharge: Yes     Patient: Severino Lin Patient : 1950 MRN: [id number]    [unfilled]    RARS: No data recorded     Spoke with: Patient    Discharge department/facility: [facility]     Non-face-to-face services provided:  Scheduled appointment with PCP-23    Follow Up  Future Appointments   Date Time Provider [address] [place] [address]   2023 11:15 AM MD MELISSA Rivas BS AMB   1/10/2024  2:00 PM MD MICHELLE RivasIP BS AMB       [place], [place]

## 2023-11-21 ENCOUNTER — TELEPHONE (OUTPATIENT)
Facility: CLINIC | Age: 73
End: 2023-11-21

## 2023-11-21 ENCOUNTER — OFFICE VISIT (OUTPATIENT)
Facility: CLINIC | Age: 73
End: 2023-11-21
Payer: MEDICARE

## 2023-11-21 VITALS
TEMPERATURE: 97.9 F | OXYGEN SATURATION: 96 % | HEART RATE: 72 BPM | DIASTOLIC BLOOD PRESSURE: 80 MMHG | HEIGHT: 63 IN | WEIGHT: 214.4 LBS | SYSTOLIC BLOOD PRESSURE: 136 MMHG | BODY MASS INDEX: 37.99 KG/M2

## 2023-11-21 DIAGNOSIS — I27.20 PULMONARY HTN (HCC): ICD-10-CM

## 2023-11-21 DIAGNOSIS — Z09 HOSPITAL DISCHARGE FOLLOW-UP: ICD-10-CM

## 2023-11-21 DIAGNOSIS — F51.04 PSYCHOPHYSIOLOGICAL INSOMNIA: ICD-10-CM

## 2023-11-21 DIAGNOSIS — M79.7 FIBROMYALGIA: ICD-10-CM

## 2023-11-21 DIAGNOSIS — J43.2 CENTRILOBULAR EMPHYSEMA (HCC): ICD-10-CM

## 2023-11-21 DIAGNOSIS — E03.9 HYPOTHYROIDISM, UNSPECIFIED TYPE: ICD-10-CM

## 2023-11-21 DIAGNOSIS — M05.9 RHEUMATOID ARTHRITIS WITH POSITIVE RHEUMATOID FACTOR, INVOLVING UNSPECIFIED SITE (HCC): ICD-10-CM

## 2023-11-21 DIAGNOSIS — E66.01 SEVERE OBESITY (BMI 35.0-39.9) WITH COMORBIDITY (HCC): ICD-10-CM

## 2023-11-21 DIAGNOSIS — J44.1 COPD EXACERBATION (HCC): ICD-10-CM

## 2023-11-21 DIAGNOSIS — I10 ESSENTIAL HYPERTENSION: ICD-10-CM

## 2023-11-21 PROCEDURE — 1090F PRES/ABSN URINE INCON ASSESS: CPT | Performed by: INTERNAL MEDICINE

## 2023-11-21 PROCEDURE — 3079F DIAST BP 80-89 MM HG: CPT | Performed by: INTERNAL MEDICINE

## 2023-11-21 PROCEDURE — 3023F SPIROM DOC REV: CPT | Performed by: INTERNAL MEDICINE

## 2023-11-21 PROCEDURE — 3017F COLORECTAL CA SCREEN DOC REV: CPT | Performed by: INTERNAL MEDICINE

## 2023-11-21 PROCEDURE — 3075F SYST BP GE 130 - 139MM HG: CPT | Performed by: INTERNAL MEDICINE

## 2023-11-21 PROCEDURE — G8417 CALC BMI ABV UP PARAM F/U: HCPCS | Performed by: INTERNAL MEDICINE

## 2023-11-21 PROCEDURE — 99214 OFFICE O/P EST MOD 30 MIN: CPT | Performed by: INTERNAL MEDICINE

## 2023-11-21 PROCEDURE — 1123F ACP DISCUSS/DSCN MKR DOCD: CPT | Performed by: INTERNAL MEDICINE

## 2023-11-21 PROCEDURE — 1036F TOBACCO NON-USER: CPT | Performed by: INTERNAL MEDICINE

## 2023-11-21 PROCEDURE — G8400 PT W/DXA NO RESULTS DOC: HCPCS | Performed by: INTERNAL MEDICINE

## 2023-11-21 PROCEDURE — G8484 FLU IMMUNIZE NO ADMIN: HCPCS | Performed by: INTERNAL MEDICINE

## 2023-11-21 PROCEDURE — G8427 DOCREV CUR MEDS BY ELIG CLIN: HCPCS | Performed by: INTERNAL MEDICINE

## 2023-11-21 RX ORDER — TELMISARTAN AND HYDROCHLORTHIAZIDE 40; 12.5 MG/1; MG/1
1 TABLET ORAL EVERY MORNING
Qty: 90 TABLET | Refills: 2 | Status: CANCELLED | OUTPATIENT
Start: 2023-11-21

## 2023-11-21 RX ORDER — LOSARTAN POTASSIUM AND HYDROCHLOROTHIAZIDE 12.5; 5 MG/1; MG/1
1 TABLET ORAL DAILY
Qty: 90 TABLET | Refills: 1 | Status: SHIPPED | OUTPATIENT
Start: 2023-11-21

## 2023-11-21 ASSESSMENT — ENCOUNTER SYMPTOMS
RESPIRATORY NEGATIVE: 1
ALLERGIC/IMMUNOLOGIC NEGATIVE: 1
EYES NEGATIVE: 1
GASTROINTESTINAL NEGATIVE: 1

## 2023-11-21 NOTE — PROGRESS NOTES
Darrel Montoya (: 1950) is a 68 y.o. female, Established patient patient, here for evaluation of the following chief complaint(s):  Follow-Up from [facility] 23-23 for Edema and SOB) and Hypertension (Patient still has not received BP medication from pharmacy. )         ASSESSMENT/PLAN:  Below is the assessment and plan developed based on review of pertinent history, physical exam, labs, studies, and medications. 1. Hospital discharge follow-up  2. COPD exacerbation ([address])  3. Essential hypertension  -     AFL - Kamran Teixeira MD, Interventional Cardiology, [facility]  4. Centrilobular emphysema ([address])  -     Bayard Peabody, MD, Pulmonology, [facility]  5. Pulmonary HTN ([address])  -     Noel Tom MD, Interventional Cardiology, [facility]  6. Hypothyroidism, unspecified type  7. Rheumatoid arthritis with positive rheumatoid factor, involving unspecified site ([address])  8. Fibromyalgia  9. Psychophysiological insomnia  10. Severe obesity (BMI 35.0-39. 9) with comorbidity [facility])    Follow-up after recent ER/overnight hospital admission and she said there was no room available so she was in the ER and she was admitted on  and discharged on . I reviewed admission and discharge summary and consult notes imagings lab results and shared with her. Today I have referred her to pulmonologist and cardiologist.  She is concerned with findings on CTA regarding chronic pulmonary arterial hypertension. And she has cardiomegaly and centrilobular emphysema. She has quit smoking cigarette for last 1 year. She is on rescue and maintenance inhaler. She is prescribed steroid and antibiotic that she has started taking it    Please go through my notes.         Admit date: 2023  Discharge date and time: 2023     DISCHARGE DIAGNOSIS:     # COPD exacerbation, mild  - Discharge on abx, steroids, LAMA, LABA + ICS     # Hypoxia  -

## 2023-11-27 RX ORDER — HYDROXYZINE HYDROCHLORIDE 25 MG/1
TABLET, FILM COATED ORAL
Qty: 180 TABLET | Refills: 1 | Status: SHIPPED | OUTPATIENT
Start: 2023-11-27

## 2023-12-21 PROBLEM — Z09 HOSPITAL DISCHARGE FOLLOW-UP: Status: RESOLVED | Noted: 2023-11-21 | Resolved: 2023-12-21

## 2024-01-09 RX ORDER — LEVOTHYROXINE SODIUM 0.05 MG/1
TABLET ORAL
Qty: 90 TABLET | Refills: 3 | Status: SHIPPED | OUTPATIENT
Start: 2024-01-09

## 2024-01-15 RX ORDER — TRAZODONE HYDROCHLORIDE 50 MG/1
50 TABLET ORAL NIGHTLY
Qty: 30 TABLET | Refills: 0 | Status: SHIPPED | OUTPATIENT
Start: 2024-01-15

## 2024-01-18 ENCOUNTER — TELEPHONE (OUTPATIENT)
Facility: CLINIC | Age: 74
End: 2024-01-18

## 2024-01-18 NOTE — TELEPHONE ENCOUNTER
Patient called. She stated she has been sick for several days. She is complaining of coughing, nasal congestion, fever and chills, loss of taste, bad headache and lightheadedness. Patient was advised to go to Urgent Care to be evaluated and treated.

## 2024-01-19 ENCOUNTER — TELEPHONE (OUTPATIENT)
Facility: CLINIC | Age: 74
End: 2024-01-19

## 2024-01-19 NOTE — TELEPHONE ENCOUNTER
Patient called. Patient stated she received an email from [facility] 1/15/24 saying that her Trazodone was denied and she asked if it could be sent. Patient was informed that Trazodone was sent 01/15/24 at 9:20 AM and receipt confirmed by pharmacy. Patient will call [facility] and check status of Rx.

## 2024-02-01 RX ORDER — TRAZODONE HYDROCHLORIDE 50 MG/1
50 TABLET ORAL NIGHTLY
Qty: 30 TABLET | Refills: 11 | OUTPATIENT
Start: 2024-02-01

## 2024-02-19 PROBLEM — I27.20 PULMONARY HTN (HCC): Status: RESOLVED | Noted: 2023-11-21 | Resolved: 2024-02-19

## 2024-02-19 PROBLEM — I42.9 CARDIOMYOPATHY (HCC): Status: RESOLVED | Noted: 2023-11-16 | Resolved: 2024-02-19

## 2024-02-20 RX ORDER — LOSARTAN POTASSIUM AND HYDROCHLOROTHIAZIDE 12.5; 5 MG/1; MG/1
1 TABLET ORAL EVERY MORNING
Qty: 90 TABLET | Refills: 1 | Status: SHIPPED | OUTPATIENT
Start: 2024-02-20 | End: 2024-02-22 | Stop reason: SDUPTHER

## 2024-02-20 RX ORDER — LOSARTAN POTASSIUM AND HYDROCHLOROTHIAZIDE 12.5; 5 MG/1; MG/1
1 TABLET ORAL DAILY
Qty: 90 TABLET | Refills: 1 | OUTPATIENT
Start: 2024-02-20

## 2024-02-22 ENCOUNTER — OFFICE VISIT (OUTPATIENT)
Facility: CLINIC | Age: 74
End: 2024-02-22
Payer: MEDICARE

## 2024-02-22 VITALS
OXYGEN SATURATION: 97 % | SYSTOLIC BLOOD PRESSURE: 118 MMHG | TEMPERATURE: 98.9 F | WEIGHT: 213.4 LBS | HEART RATE: 64 BPM | DIASTOLIC BLOOD PRESSURE: 78 MMHG | HEIGHT: 63 IN | BODY MASS INDEX: 37.81 KG/M2

## 2024-02-22 DIAGNOSIS — I10 ESSENTIAL HYPERTENSION: ICD-10-CM

## 2024-02-22 DIAGNOSIS — E66.01 SEVERE OBESITY (BMI 35.0-39.9) WITH COMORBIDITY (HCC): ICD-10-CM

## 2024-02-22 DIAGNOSIS — J44.1 COPD EXACERBATION (HCC): ICD-10-CM

## 2024-02-22 DIAGNOSIS — M79.7 FIBROMYALGIA: ICD-10-CM

## 2024-02-22 DIAGNOSIS — J43.2 CENTRILOBULAR EMPHYSEMA (HCC): ICD-10-CM

## 2024-02-22 DIAGNOSIS — L29.9 CHRONIC PRURITUS: ICD-10-CM

## 2024-02-22 DIAGNOSIS — E78.00 PURE HYPERCHOLESTEROLEMIA: ICD-10-CM

## 2024-02-22 DIAGNOSIS — F41.9 ANXIETY: ICD-10-CM

## 2024-02-22 DIAGNOSIS — F51.04 PSYCHOPHYSIOLOGICAL INSOMNIA: ICD-10-CM

## 2024-02-22 DIAGNOSIS — M05.9 RHEUMATOID ARTHRITIS WITH POSITIVE RHEUMATOID FACTOR, INVOLVING UNSPECIFIED SITE (HCC): ICD-10-CM

## 2024-02-22 DIAGNOSIS — K21.9 GASTROESOPHAGEAL REFLUX DISEASE WITHOUT ESOPHAGITIS: ICD-10-CM

## 2024-02-22 DIAGNOSIS — E03.9 HYPOTHYROIDISM, UNSPECIFIED TYPE: ICD-10-CM

## 2024-02-22 PROCEDURE — 1036F TOBACCO NON-USER: CPT | Performed by: INTERNAL MEDICINE

## 2024-02-22 PROCEDURE — G8427 DOCREV CUR MEDS BY ELIG CLIN: HCPCS | Performed by: INTERNAL MEDICINE

## 2024-02-22 PROCEDURE — G8417 CALC BMI ABV UP PARAM F/U: HCPCS | Performed by: INTERNAL MEDICINE

## 2024-02-22 PROCEDURE — 3074F SYST BP LT 130 MM HG: CPT | Performed by: INTERNAL MEDICINE

## 2024-02-22 PROCEDURE — 3078F DIAST BP <80 MM HG: CPT | Performed by: INTERNAL MEDICINE

## 2024-02-22 PROCEDURE — 3017F COLORECTAL CA SCREEN DOC REV: CPT | Performed by: INTERNAL MEDICINE

## 2024-02-22 PROCEDURE — 1123F ACP DISCUSS/DSCN MKR DOCD: CPT | Performed by: INTERNAL MEDICINE

## 2024-02-22 PROCEDURE — 1090F PRES/ABSN URINE INCON ASSESS: CPT | Performed by: INTERNAL MEDICINE

## 2024-02-22 PROCEDURE — G8484 FLU IMMUNIZE NO ADMIN: HCPCS | Performed by: INTERNAL MEDICINE

## 2024-02-22 PROCEDURE — 3023F SPIROM DOC REV: CPT | Performed by: INTERNAL MEDICINE

## 2024-02-22 PROCEDURE — 99214 OFFICE O/P EST MOD 30 MIN: CPT | Performed by: INTERNAL MEDICINE

## 2024-02-22 PROCEDURE — G8400 PT W/DXA NO RESULTS DOC: HCPCS | Performed by: INTERNAL MEDICINE

## 2024-02-22 RX ORDER — LOSARTAN POTASSIUM AND HYDROCHLOROTHIAZIDE 12.5; 5 MG/1; MG/1
1 TABLET ORAL EVERY MORNING
Qty: 90 TABLET | Refills: 1 | Status: SHIPPED | OUTPATIENT
Start: 2024-02-22

## 2024-02-22 RX ORDER — HYDROXYZINE HYDROCHLORIDE 25 MG/1
TABLET, FILM COATED ORAL
Qty: 180 TABLET | Refills: 1 | Status: SHIPPED | OUTPATIENT
Start: 2024-02-22

## 2024-02-22 RX ORDER — LEVOTHYROXINE SODIUM 0.05 MG/1
50 TABLET ORAL
Qty: 90 TABLET | Refills: 1 | Status: SHIPPED | OUTPATIENT
Start: 2024-02-22

## 2024-02-22 RX ORDER — OMEPRAZOLE 40 MG/1
40 CAPSULE, DELAYED RELEASE ORAL
Qty: 90 CAPSULE | Refills: 1 | Status: SHIPPED | OUTPATIENT
Start: 2024-02-22

## 2024-02-22 RX ORDER — ROSUVASTATIN CALCIUM 20 MG/1
20 TABLET, COATED ORAL DAILY
COMMUNITY
Start: 2024-02-08 | End: 2024-02-22

## 2024-02-22 RX ORDER — LOSARTAN POTASSIUM AND HYDROCHLOROTHIAZIDE 12.5; 5 MG/1; MG/1
1 TABLET ORAL EVERY MORNING
Qty: 90 TABLET | Refills: 1 | Status: SHIPPED | OUTPATIENT
Start: 2024-02-22 | End: 2024-02-22 | Stop reason: SDUPTHER

## 2024-02-22 RX ORDER — TRAZODONE HYDROCHLORIDE 50 MG/1
50 TABLET ORAL NIGHTLY
Qty: 90 TABLET | Refills: 1 | Status: SHIPPED | OUTPATIENT
Start: 2024-02-22

## 2024-02-22 RX ORDER — ALBUTEROL SULFATE 90 UG/1
2 AEROSOL, METERED RESPIRATORY (INHALATION) EVERY 4 HOURS PRN
Qty: 3 EACH | Refills: 3 | Status: SHIPPED | OUTPATIENT
Start: 2024-02-22

## 2024-02-22 RX ORDER — FLUTICASONE FUROATE, UMECLIDINIUM BROMIDE AND VILANTEROL TRIFENATATE 100; 62.5; 25 UG/1; UG/1; UG/1
1 POWDER RESPIRATORY (INHALATION) DAILY
COMMUNITY

## 2024-02-22 ASSESSMENT — PATIENT HEALTH QUESTIONNAIRE - PHQ9
2. FEELING DOWN, DEPRESSED OR HOPELESS: 0
SUM OF ALL RESPONSES TO PHQ QUESTIONS 1-9: 0
SUM OF ALL RESPONSES TO PHQ9 QUESTIONS 1 & 2: 0
1. LITTLE INTEREST OR PLEASURE IN DOING THINGS: 0
SUM OF ALL RESPONSES TO PHQ QUESTIONS 1-9: 0

## 2024-02-22 ASSESSMENT — ENCOUNTER SYMPTOMS
RESPIRATORY NEGATIVE: 1
EYES NEGATIVE: 1
ALLERGIC/IMMUNOLOGIC NEGATIVE: 1
GASTROINTESTINAL NEGATIVE: 1

## 2024-02-22 NOTE — PROGRESS NOTES
Chief Complaint   Patient presents with    Follow-up Chronic Condition    BP (!) 122/54 (Site: Left Upper Arm, Position: Sitting, Cuff Size: Large Adult)   Pulse 65   Ht 1.6 m (5' 3\")   Wt 96.8 kg (213 lb 6.4 oz)   SpO2 97%   BMI 37.80 kg/m²    \"Have you been to the ER, urgent care clinic since your last visit?  Hospitalized since your last visit?\"    NO    “Have you seen or consulted any other health care providers outside of [facility] since your last visit?”    NO         
Breath or Wheezing 3 each 3    losartan-hydroCHLOROthiazide (HYZAAR) 50-12.5 MG per tablet Take 1 tablet by mouth every morning 90 tablet 1    hydrOXYzine HCl (ATARAX) 25 MG tablet TAKE 1 TABLET TWICE A DAY AS NEEDED FOR SLEEP AND AGITATION,ANXIETY 180 tablet 1    folic acid (FOLVITE) 1 MG tablet Take 1 tablet by mouth daily      methotrexate (RHEUMATREX) 2.5 MG chemo tablet Take 1 tablet by mouth once a week 8 pill once a week      pregabalin (LYRICA) 150 MG capsule Take by mouth 2 times daily.      triamcinolone (KENALOG) 0.1 % cream APPLY TO THE AFFECTED AREA TWICE A DAY USE A THIN LAYER      albuterol (PROVENTIL) (2.5 MG/3ML) 0.083% nebulizer solution USE 3 ML VIA NEBULIZER EVERY 6 HOURS AS NEEDED FOR WHEEZING 360 mL 3     No current facility-administered medications for this visit.      Past Medical History:   Diagnosis Date    Acquired hypothyroidism 7/16/2020    Anxiety 7/16/2020    Asthma     Chronic obstructive lung disease (HCC) 7/16/2020    Cigarette smoker 7/16/2020    Depression     Dyslipidemia 7/16/2020    Essential hypertension, malignant 7/16/2020    Fibromyalgia 7/16/2020    Gastroesophageal reflux disease 7/16/2020    Insomnia 7/16/2020    Nicotine dependence 7/16/2020    Nonspecific abnormal electrocardiogram (ECG) (EKG) 7/16/2020    Obesity, unspecified 7/16/2020    Osteoarthritis     Preseptal cellulitis 7/16/2020    Pruritic rash 7/16/2020    Pure hypercholesterolemia 7/16/2020    Sleep disorder     Wheezing 7/16/2020     Past Surgical History:   Procedure Laterality Date    GYN  12/12/1994    HYSTERECTOMY, TOTAL ABDOMINAL (CERVIX REMOVED)  1995    IR FNA WITH IMAGING  7/5/2022    TONSILLECTOMY  09/12/2002     Family History   Problem Relation Age of Onset    Hypertension Father     Cancer Mother         lung     Social History     Tobacco Use   Smoking Status Former    Current packs/day: 0.00    Average packs/day: 0.5 packs/day for 53.5 years (26.7 ttl pk-yrs)    Types: Cigarettes    Start

## 2024-03-18 RX ORDER — ALBUTEROL SULFATE 90 UG/1
2 AEROSOL, METERED RESPIRATORY (INHALATION) EVERY 4 HOURS PRN
Qty: 25.5 G | Refills: 5 | Status: SHIPPED | OUTPATIENT
Start: 2024-03-18

## 2024-05-20 PROBLEM — Z12.31 ENCOUNTER FOR SCREENING MAMMOGRAM FOR MALIGNANT NEOPLASM OF BREAST: Status: ACTIVE | Noted: 2024-05-20

## 2024-05-20 PROBLEM — R63.5 WEIGHT GAIN: Status: RESOLVED | Noted: 2023-11-16 | Resolved: 2024-05-20

## 2024-05-20 PROBLEM — J44.1 COPD EXACERBATION (HCC): Status: RESOLVED | Noted: 2020-12-10 | Resolved: 2024-05-20

## 2024-05-20 PROBLEM — M79.89 SWELLING OF BOTH LOWER EXTREMITIES: Status: RESOLVED | Noted: 2023-11-16 | Resolved: 2024-05-20

## 2024-05-24 ENCOUNTER — OFFICE VISIT (OUTPATIENT)
Facility: CLINIC | Age: 74
End: 2024-05-24
Payer: COMMERCIAL

## 2024-05-24 VITALS
TEMPERATURE: 98.6 F | WEIGHT: 221.4 LBS | OXYGEN SATURATION: 96 % | BODY MASS INDEX: 41.8 KG/M2 | RESPIRATION RATE: 18 BRPM | HEIGHT: 61 IN | DIASTOLIC BLOOD PRESSURE: 70 MMHG | HEART RATE: 72 BPM | SYSTOLIC BLOOD PRESSURE: 130 MMHG

## 2024-05-24 DIAGNOSIS — E78.00 PURE HYPERCHOLESTEROLEMIA: ICD-10-CM

## 2024-05-24 DIAGNOSIS — E03.9 HYPOTHYROIDISM, UNSPECIFIED TYPE: ICD-10-CM

## 2024-05-24 DIAGNOSIS — I10 ESSENTIAL HYPERTENSION: Primary | ICD-10-CM

## 2024-05-24 DIAGNOSIS — Z53.20 MAMMOGRAM DECLINED: ICD-10-CM

## 2024-05-24 DIAGNOSIS — I10 ESSENTIAL HYPERTENSION: ICD-10-CM

## 2024-05-24 DIAGNOSIS — M79.7 FIBROMYALGIA: ICD-10-CM

## 2024-05-24 DIAGNOSIS — Z12.31 ENCOUNTER FOR SCREENING MAMMOGRAM FOR MALIGNANT NEOPLASM OF BREAST: ICD-10-CM

## 2024-05-24 DIAGNOSIS — J43.2 CENTRILOBULAR EMPHYSEMA (HCC): ICD-10-CM

## 2024-05-24 DIAGNOSIS — F41.9 ANXIETY: ICD-10-CM

## 2024-05-24 DIAGNOSIS — M05.9 RHEUMATOID ARTHRITIS WITH POSITIVE RHEUMATOID FACTOR, INVOLVING UNSPECIFIED SITE (HCC): ICD-10-CM

## 2024-05-24 PROCEDURE — 1123F ACP DISCUSS/DSCN MKR DOCD: CPT | Performed by: INTERNAL MEDICINE

## 2024-05-24 PROCEDURE — 3078F DIAST BP <80 MM HG: CPT | Performed by: INTERNAL MEDICINE

## 2024-05-24 PROCEDURE — 99214 OFFICE O/P EST MOD 30 MIN: CPT | Performed by: INTERNAL MEDICINE

## 2024-05-24 PROCEDURE — 3074F SYST BP LT 130 MM HG: CPT | Performed by: INTERNAL MEDICINE

## 2024-05-24 RX ORDER — LOSARTAN POTASSIUM 25 MG/1
25 TABLET ORAL DAILY
Qty: 90 TABLET | Refills: 1 | Status: SHIPPED | OUTPATIENT
Start: 2024-05-24

## 2024-05-24 RX ORDER — FUROSEMIDE 20 MG/1
20 TABLET ORAL EVERY MORNING
Qty: 10 TABLET | Refills: 0 | Status: SHIPPED | OUTPATIENT
Start: 2024-05-24

## 2024-05-24 RX ORDER — POTASSIUM CHLORIDE 750 MG/1
10 TABLET, EXTENDED RELEASE ORAL 2 TIMES DAILY
Qty: 180 TABLET | Refills: 1 | Status: SHIPPED | OUTPATIENT
Start: 2024-05-24

## 2024-05-24 RX ORDER — FUROSEMIDE 20 MG/1
20 TABLET ORAL EVERY MORNING
Qty: 90 TABLET | Refills: 1 | Status: SHIPPED | OUTPATIENT
Start: 2024-05-24

## 2024-05-24 ASSESSMENT — ENCOUNTER SYMPTOMS
GASTROINTESTINAL NEGATIVE: 1
ALLERGIC/IMMUNOLOGIC NEGATIVE: 1
RESPIRATORY NEGATIVE: 1

## 2024-05-24 NOTE — PROGRESS NOTES
\"Have you been to the ER, urgent care clinic since your last visit?  Hospitalized since your last visit?\"    NO    “Have you seen or consulted any other health care providers outside of [facility] since your last visit?”    YES - When: approximately Jan 2024 ago.  Where and Why: Rheumatologist for a follow up.    Have you had a mammogram?”   NO    Date of last Mammogram: 10/4/2013       Chief Complaint   Patient presents with    Follow-up Chronic Condition     /67 (Site: Left Upper Arm, Position: Sitting, Cuff Size: Medium Adult)   Pulse 71   Temp 98.6 °F (37 °C) (Oral)   Resp 18   Ht 1.549 m (5' 1\")   Wt 100.4 kg (221 lb 6.4 oz)   SpO2 96%   BMI 41.83 kg/m²         Click Here for Release of Records Request

## 2024-05-24 NOTE — PROGRESS NOTES
Katy Jackman (: 1950) is a 73 y.o. female, Established patient patient, here for evaluation of the following chief complaint(s):  Follow-up Chronic Condition         ASSESSMENT/PLAN:  Below is the assessment and plan developed based on review of pertinent history, physical exam, labs, studies, and medications.      1. Essential hypertension  -     Basic Metabolic Panel; Future  2. Pure hypercholesterolemia  3. Hypothyroidism, unspecified type  -     Basic Metabolic Panel; Future  -     TSH with Reflex to FT4; Future  4. Fibromyalgia  5. Rheumatoid arthritis with positive rheumatoid factor, involving unspecified site (HCC)  6. Centrilobular emphysema (HCC)  7. Anxiety  8. Encounter for screening mammogram for malignant neoplasm of breast  9. Mammogram declined    Ms. Burns is feeling swelling of both feet and lower legs for last few days and she checked her pulse ox which was 92% at room air.  She is taking rescue and maintenance inhaler.  She is already on HCTZ 12.5 mg/day    I made changes as below.    Started on furosemide 20 mg once a day in the morning with potassium 10 mill colons twice a day.    losartan/HCTZ 50/12.5 mg stopped and started on losartan only 25 mg/day and keep a blood pressure monitoring if blood pressure is lower than 110/60 she should not take losartan 25 mg/day.  Sent to the mail order pharmacy [facility] mail order for 6-month supply she does not want to send to local pharmacy however she has some weight gain and swelling and lungs are clear, explained in details about getting some furosemide from local pharmacy she was not interested to take medicine from local pharmacy however she agreed and I sent furosemide prescription to the [facility] and recommended not to take losartan/HCTZ without checking blood pressure from tomorrow until she gets mail order pharmacy and if needed she can only take half tablet of the current medicine of losartan/HCTZ    COPD due to history of smoking

## 2024-05-25 LAB
BUN SERPL-MCNC: 11 MG/DL (ref 8–27)
BUN/CREAT SERPL: 13 (ref 12–28)
CALCIUM SERPL-MCNC: 9 MG/DL (ref 8.7–10.3)
CHLORIDE SERPL-SCNC: 99 MMOL/L (ref 96–106)
CO2 SERPL-SCNC: 25 MMOL/L (ref 20–29)
CREAT SERPL-MCNC: 0.82 MG/DL (ref 0.57–1)
EGFRCR SERPLBLD CKD-EPI 2021: 76 ML/MIN/1.73
GLUCOSE SERPL-MCNC: 88 MG/DL (ref 70–99)
POTASSIUM SERPL-SCNC: 4.5 MMOL/L (ref 3.5–5.2)
SODIUM SERPL-SCNC: 137 MMOL/L (ref 134–144)
TSH SERPL DL<=0.005 MIU/L-ACNC: 1.79 UIU/ML (ref 0.45–4.5)

## 2024-06-19 PROBLEM — Z12.31 ENCOUNTER FOR SCREENING MAMMOGRAM FOR MALIGNANT NEOPLASM OF BREAST: Status: RESOLVED | Noted: 2024-05-20 | Resolved: 2024-06-19

## 2024-07-25 RX ORDER — HYDROXYZINE HYDROCHLORIDE 25 MG/1
TABLET, FILM COATED ORAL
Qty: 180 TABLET | Refills: 1 | Status: SHIPPED | OUTPATIENT
Start: 2024-07-25

## 2024-07-25 RX ORDER — OMEPRAZOLE 40 MG/1
40 CAPSULE, DELAYED RELEASE ORAL
Qty: 90 CAPSULE | Refills: 1 | Status: SHIPPED | OUTPATIENT
Start: 2024-07-25

## 2024-07-25 RX ORDER — TRAZODONE HYDROCHLORIDE 50 MG/1
50 TABLET ORAL NIGHTLY
Qty: 90 TABLET | Refills: 1 | Status: SHIPPED | OUTPATIENT
Start: 2024-07-25

## 2024-07-25 RX ORDER — LEVOTHYROXINE SODIUM 0.05 MG/1
50 TABLET ORAL
Qty: 90 TABLET | Refills: 1 | Status: SHIPPED | OUTPATIENT
Start: 2024-07-25

## 2024-09-03 RX ORDER — ALBUTEROL SULFATE 90 UG/1
AEROSOL, METERED RESPIRATORY (INHALATION)
Qty: 3 EACH | Refills: 3 | Status: SHIPPED | OUTPATIENT
Start: 2024-09-03

## 2024-09-11 ENCOUNTER — OFFICE VISIT (OUTPATIENT)
Facility: CLINIC | Age: 74
End: 2024-09-11

## 2024-09-11 VITALS
TEMPERATURE: 98 F | BODY MASS INDEX: 43.43 KG/M2 | SYSTOLIC BLOOD PRESSURE: 138 MMHG | RESPIRATION RATE: 20 BRPM | WEIGHT: 230 LBS | OXYGEN SATURATION: 92 % | HEIGHT: 61 IN | DIASTOLIC BLOOD PRESSURE: 70 MMHG | HEART RATE: 72 BPM

## 2024-09-11 DIAGNOSIS — M79.662 PAIN AND SWELLING OF LEFT LOWER LEG: ICD-10-CM

## 2024-09-11 DIAGNOSIS — N95.9 MENOPAUSAL AND PERIMENOPAUSAL DISORDER: ICD-10-CM

## 2024-09-11 DIAGNOSIS — I73.9 PVD (PERIPHERAL VASCULAR DISEASE) (HCC): ICD-10-CM

## 2024-09-11 DIAGNOSIS — Z00.00 MEDICARE ANNUAL WELLNESS VISIT, SUBSEQUENT: ICD-10-CM

## 2024-09-11 DIAGNOSIS — I10 ESSENTIAL HYPERTENSION: ICD-10-CM

## 2024-09-11 DIAGNOSIS — M79.661 PAIN AND SWELLING OF RIGHT LOWER LEG: ICD-10-CM

## 2024-09-11 DIAGNOSIS — E03.9 HYPOTHYROIDISM, UNSPECIFIED TYPE: ICD-10-CM

## 2024-09-11 DIAGNOSIS — M79.7 FIBROMYALGIA: ICD-10-CM

## 2024-09-11 DIAGNOSIS — J43.2 CENTRILOBULAR EMPHYSEMA (HCC): ICD-10-CM

## 2024-09-11 DIAGNOSIS — K21.9 GASTROESOPHAGEAL REFLUX DISEASE WITHOUT ESOPHAGITIS: ICD-10-CM

## 2024-09-11 DIAGNOSIS — R73.9 HYPERGLYCEMIA: ICD-10-CM

## 2024-09-11 DIAGNOSIS — Z87.891 EX-SMOKER: ICD-10-CM

## 2024-09-11 DIAGNOSIS — F41.9 ANXIETY: ICD-10-CM

## 2024-09-11 DIAGNOSIS — M79.89 PAIN AND SWELLING OF LEFT LOWER LEG: ICD-10-CM

## 2024-09-11 DIAGNOSIS — M05.9 RHEUMATOID ARTHRITIS WITH POSITIVE RHEUMATOID FACTOR, INVOLVING UNSPECIFIED SITE (HCC): ICD-10-CM

## 2024-09-11 DIAGNOSIS — J44.9 CHRONIC OBSTRUCTIVE PULMONARY DISEASE, UNSPECIFIED COPD TYPE (HCC): ICD-10-CM

## 2024-09-11 DIAGNOSIS — F51.04 PSYCHOPHYSIOLOGICAL INSOMNIA: ICD-10-CM

## 2024-09-11 DIAGNOSIS — M79.89 PAIN AND SWELLING OF RIGHT LOWER LEG: ICD-10-CM

## 2024-09-11 DIAGNOSIS — Z12.31 ENCOUNTER FOR SCREENING MAMMOGRAM FOR BREAST CANCER: ICD-10-CM

## 2024-09-11 PROBLEM — E55.9 VITAMIN D DEFICIENCY: Status: ACTIVE | Noted: 2024-09-11

## 2024-09-11 RX ORDER — FUROSEMIDE 20 MG
TABLET ORAL
Qty: 90 TABLET | Refills: 2 | Status: SHIPPED | OUTPATIENT
Start: 2024-09-11

## 2024-09-11 RX ORDER — NYSTATIN 100000 [USP'U]/G
POWDER TOPICAL
Qty: 60 G | Refills: 5 | Status: SHIPPED | OUTPATIENT
Start: 2024-09-11 | End: 2024-09-11 | Stop reason: SDUPTHER

## 2024-09-11 RX ORDER — NYSTATIN 100000 [USP'U]/G
POWDER TOPICAL
Qty: 180 G | Refills: 1 | Status: SHIPPED | OUTPATIENT
Start: 2024-09-11

## 2024-09-11 SDOH — ECONOMIC STABILITY: FOOD INSECURITY: WITHIN THE PAST 12 MONTHS, YOU WORRIED THAT YOUR FOOD WOULD RUN OUT BEFORE YOU GOT MONEY TO BUY MORE.: NEVER TRUE

## 2024-09-11 SDOH — ECONOMIC STABILITY: FOOD INSECURITY: WITHIN THE PAST 12 MONTHS, THE FOOD YOU BOUGHT JUST DIDN'T LAST AND YOU DIDN'T HAVE MONEY TO GET MORE.: NEVER TRUE

## 2024-09-11 SDOH — ECONOMIC STABILITY: INCOME INSECURITY: HOW HARD IS IT FOR YOU TO PAY FOR THE VERY BASICS LIKE FOOD, HOUSING, MEDICAL CARE, AND HEATING?: NOT HARD AT ALL

## 2024-09-11 ASSESSMENT — ENCOUNTER SYMPTOMS
ALLERGIC/IMMUNOLOGIC NEGATIVE: 1
GASTROINTESTINAL NEGATIVE: 1
RESPIRATORY NEGATIVE: 1

## 2024-09-11 ASSESSMENT — PATIENT HEALTH QUESTIONNAIRE - PHQ9
SUM OF ALL RESPONSES TO PHQ QUESTIONS 1-9: 0
SUM OF ALL RESPONSES TO PHQ QUESTIONS 1-9: 0
SUM OF ALL RESPONSES TO PHQ9 QUESTIONS 1 & 2: 0
SUM OF ALL RESPONSES TO PHQ QUESTIONS 1-9: 0
2. FEELING DOWN, DEPRESSED OR HOPELESS: NOT AT ALL
1. LITTLE INTEREST OR PLEASURE IN DOING THINGS: NOT AT ALL
SUM OF ALL RESPONSES TO PHQ QUESTIONS 1-9: 0

## 2024-09-14 LAB
ALBUMIN SERPL-MCNC: 4 G/DL (ref 3.8–4.8)
ALP SERPL-CCNC: 93 IU/L (ref 44–121)
ALT SERPL-CCNC: 10 IU/L (ref 0–32)
AST SERPL-CCNC: 13 IU/L (ref 0–40)
BASOPHILS # BLD AUTO: 0.1 X10E3/UL (ref 0–0.2)
BASOPHILS NFR BLD AUTO: 1 %
BILIRUB SERPL-MCNC: 0.5 MG/DL (ref 0–1.2)
BUN SERPL-MCNC: 8 MG/DL (ref 8–27)
BUN/CREAT SERPL: 9 (ref 12–28)
CALCIUM SERPL-MCNC: 8.7 MG/DL (ref 8.7–10.3)
CHLORIDE SERPL-SCNC: 101 MMOL/L (ref 96–106)
CO2 SERPL-SCNC: 25 MMOL/L (ref 20–29)
CREAT SERPL-MCNC: 0.9 MG/DL (ref 0.57–1)
EGFRCR SERPLBLD CKD-EPI 2021: 68 ML/MIN/1.73
EOSINOPHIL # BLD AUTO: 0.1 X10E3/UL (ref 0–0.4)
EOSINOPHIL NFR BLD AUTO: 1 %
ERYTHROCYTE [DISTWIDTH] IN BLOOD BY AUTOMATED COUNT: 12 % (ref 11.7–15.4)
GLOBULIN SER CALC-MCNC: 2.9 G/DL (ref 1.5–4.5)
GLUCOSE SERPL-MCNC: 98 MG/DL (ref 70–99)
HBA1C MFR BLD: 5.9 % (ref 4.8–5.6)
HCT VFR BLD AUTO: 42.1 % (ref 34–46.6)
HGB BLD-MCNC: 13.4 G/DL (ref 11.1–15.9)
IMM GRANULOCYTES # BLD AUTO: 0 X10E3/UL (ref 0–0.1)
IMM GRANULOCYTES NFR BLD AUTO: 0 %
LYMPHOCYTES # BLD AUTO: 2.7 X10E3/UL (ref 0.7–3.1)
LYMPHOCYTES NFR BLD AUTO: 43 %
MCH RBC QN AUTO: 28.3 PG (ref 26.6–33)
MCHC RBC AUTO-ENTMCNC: 31.8 G/DL (ref 31.5–35.7)
MCV RBC AUTO: 89 FL (ref 79–97)
MONOCYTES # BLD AUTO: 0.8 X10E3/UL (ref 0.1–0.9)
MONOCYTES NFR BLD AUTO: 13 %
NEUTROPHILS # BLD AUTO: 2.6 X10E3/UL (ref 1.4–7)
NEUTROPHILS NFR BLD AUTO: 42 %
PLATELET # BLD AUTO: 294 X10E3/UL (ref 150–450)
POTASSIUM SERPL-SCNC: 3.8 MMOL/L (ref 3.5–5.2)
PROT SERPL-MCNC: 6.9 G/DL (ref 6–8.5)
RBC # BLD AUTO: 4.73 X10E6/UL (ref 3.77–5.28)
SODIUM SERPL-SCNC: 140 MMOL/L (ref 134–144)
TSH SERPL DL<=0.005 MIU/L-ACNC: 2.43 UIU/ML (ref 0.45–4.5)
WBC # BLD AUTO: 6.3 X10E3/UL (ref 3.4–10.8)

## 2024-09-20 ENCOUNTER — HOSPITAL ENCOUNTER (OUTPATIENT)
Facility: HOSPITAL | Age: 74
Discharge: HOME | End: 2024-09-22
Attending: INTERNAL MEDICINE
Payer: MEDICARE

## 2024-09-20 DIAGNOSIS — M79.89 PAIN AND SWELLING OF LEFT LOWER LEG: ICD-10-CM

## 2024-09-20 DIAGNOSIS — M79.662 PAIN AND SWELLING OF LEFT LOWER LEG: ICD-10-CM

## 2024-09-20 DIAGNOSIS — M79.89 PAIN AND SWELLING OF RIGHT LOWER LEG: ICD-10-CM

## 2024-09-20 DIAGNOSIS — M79.661 PAIN AND SWELLING OF RIGHT LOWER LEG: ICD-10-CM

## 2024-09-20 DIAGNOSIS — I73.9 PVD (PERIPHERAL VASCULAR DISEASE) (HCC): ICD-10-CM

## 2024-09-20 DIAGNOSIS — Z87.891 EX-SMOKER: ICD-10-CM

## 2024-09-20 LAB
VAS LEFT ABI: 1.08
VAS LEFT ARM BP: 143 MMHG
VAS LEFT DORSALIS PEDIS BP: 137 MMHG
VAS LEFT PTA BP: 155 MMHG
VAS LEFT TBI: 0.69
VAS LEFT TOE PRESSURE: 99 MMHG
VAS RIGHT ABI: 1.13
VAS RIGHT ARM BP: 138 MMHG
VAS RIGHT DORSALIS PEDIS BP: 148 MMHG
VAS RIGHT PTA BP: 161 MMHG
VAS RIGHT TBI: 0.68
VAS RIGHT TOE PRESSURE: 97 MMHG

## 2024-09-20 PROCEDURE — 93923 UPR/LXTR ART STDY 3+ LVLS: CPT

## 2024-09-20 PROCEDURE — 93970 EXTREMITY STUDY: CPT

## 2024-09-23 PROCEDURE — 93923 UPR/LXTR ART STDY 3+ LVLS: CPT | Performed by: SURGERY

## 2024-10-11 PROBLEM — Z12.31 ENCOUNTER FOR SCREENING MAMMOGRAM FOR BREAST CANCER: Status: RESOLVED | Noted: 2024-09-11 | Resolved: 2024-10-11

## 2024-10-11 PROBLEM — Z00.00 MEDICARE ANNUAL WELLNESS VISIT, SUBSEQUENT: Status: RESOLVED | Noted: 2024-09-11 | Resolved: 2024-10-11

## 2024-10-21 ENCOUNTER — OFFICE VISIT (OUTPATIENT)
Age: 74
End: 2024-10-21
Payer: MEDICARE

## 2024-10-21 VITALS
TEMPERATURE: 97.7 F | SYSTOLIC BLOOD PRESSURE: 133 MMHG | BODY MASS INDEX: 45.55 KG/M2 | DIASTOLIC BLOOD PRESSURE: 88 MMHG | WEIGHT: 232 LBS | RESPIRATION RATE: 20 BRPM | HEIGHT: 60 IN | OXYGEN SATURATION: 95 % | HEART RATE: 74 BPM

## 2024-10-21 DIAGNOSIS — M79.89 PAIN AND SWELLING OF LEFT LOWER LEG: ICD-10-CM

## 2024-10-21 DIAGNOSIS — I73.9 PVD (PERIPHERAL VASCULAR DISEASE) (HCC): Primary | ICD-10-CM

## 2024-10-21 DIAGNOSIS — M79.89 PAIN AND SWELLING OF RIGHT LOWER LEG: ICD-10-CM

## 2024-10-21 DIAGNOSIS — M79.662 PAIN AND SWELLING OF LEFT LOWER LEG: ICD-10-CM

## 2024-10-21 DIAGNOSIS — M79.661 PAIN AND SWELLING OF RIGHT LOWER LEG: ICD-10-CM

## 2024-10-21 PROCEDURE — 1125F AMNT PAIN NOTED PAIN PRSNT: CPT | Performed by: SURGERY

## 2024-10-21 PROCEDURE — 99203 OFFICE O/P NEW LOW 30 MIN: CPT | Performed by: SURGERY

## 2024-10-21 PROCEDURE — 3079F DIAST BP 80-89 MM HG: CPT | Performed by: SURGERY

## 2024-10-21 PROCEDURE — 3075F SYST BP GE 130 - 139MM HG: CPT | Performed by: SURGERY

## 2024-10-21 PROCEDURE — 1123F ACP DISCUSS/DSCN MKR DOCD: CPT | Performed by: SURGERY

## 2024-10-21 PROCEDURE — G8427 DOCREV CUR MEDS BY ELIG CLIN: HCPCS | Performed by: SURGERY

## 2024-10-21 PROCEDURE — 1036F TOBACCO NON-USER: CPT | Performed by: SURGERY

## 2024-10-21 PROCEDURE — 1090F PRES/ABSN URINE INCON ASSESS: CPT | Performed by: SURGERY

## 2024-10-21 PROCEDURE — G8400 PT W/DXA NO RESULTS DOC: HCPCS | Performed by: SURGERY

## 2024-10-21 PROCEDURE — 3017F COLORECTAL CA SCREEN DOC REV: CPT | Performed by: SURGERY

## 2024-10-21 PROCEDURE — G8484 FLU IMMUNIZE NO ADMIN: HCPCS | Performed by: SURGERY

## 2024-10-21 PROCEDURE — G8417 CALC BMI ABV UP PARAM F/U: HCPCS | Performed by: SURGERY

## 2024-10-21 ASSESSMENT — PATIENT HEALTH QUESTIONNAIRE - PHQ9
SUM OF ALL RESPONSES TO PHQ QUESTIONS 1-9: 0
SUM OF ALL RESPONSES TO PHQ QUESTIONS 1-9: 0
SUM OF ALL RESPONSES TO PHQ9 QUESTIONS 1 & 2: 0
2. FEELING DOWN, DEPRESSED OR HOPELESS: NOT AT ALL
1. LITTLE INTEREST OR PLEASURE IN DOING THINGS: NOT AT ALL
SUM OF ALL RESPONSES TO PHQ QUESTIONS 1-9: 0
SUM OF ALL RESPONSES TO PHQ QUESTIONS 1-9: 0

## 2024-10-26 NOTE — PROGRESS NOTES
Vascular History and Physical    Patient: Katy Jackman  MRN: [id number]    YOB: 1950  Age: 73 y.o.  Sex: female     Chief Complaint:  Chief Complaint   Patient presents with    New Patient     Swelling in both legs        History of Present Illness: Katy Jackman is a 73 y.o. very pleasant woman is here today for vascular consultation for leg swelling.  Patient is complaining of the bilateral leg swelling recent month with neuropathic pain.  Patient currently on Lyrica.  Patient also complaining of aching sensation to the swelling bilateral leg.  Patient denies any chest pain shortness of breath.  No prior history of DVT.  Patient did have ultrasound done which showed no DVT.  Reflux examination not done.  KIA examination findings discussed as below.    Social History:  Social Connections: Not on file       Past Medical History:  Past Medical History:   Diagnosis Date    Acquired hypothyroidism 7/16/2020    Anxiety 7/16/2020    Asthma     Chronic obstructive lung disease (HCC) 7/16/2020    Cigarette smoker 7/16/2020    Depression     Dyslipidemia 7/16/2020    Essential hypertension, malignant 7/16/2020    Fibromyalgia 7/16/2020    Gastroesophageal reflux disease 7/16/2020    Insomnia 7/16/2020    Nicotine dependence 7/16/2020    Nonspecific abnormal electrocardiogram (ECG) (EKG) 7/16/2020    Obesity, unspecified 7/16/2020    Osteoarthritis     Preseptal cellulitis 7/16/2020    Pruritic rash 7/16/2020    Pure hypercholesterolemia 7/16/2020    Sleep disorder     Wheezing 7/16/2020       Surgical History:  Past Surgical History:   Procedure Laterality Date    GYN  12/12/1994    HYSTERECTOMY, TOTAL ABDOMINAL (CERVIX REMOVED)  1995    IR FNA WITH IMAGING  7/5/2022    TONSILLECTOMY  09/12/2002       Allergies:  Allergies   Allergen Reactions    Sulfa Antibiotics Hives           Adhesive Tape Dermatitis and Rash    Codeine Rash       Current Meds:  Current Outpatient Medications   Medication Sig

## 2024-10-29 RX ORDER — LOSARTAN POTASSIUM 25 MG/1
25 TABLET ORAL DAILY
Qty: 90 TABLET | Refills: 1 | Status: SHIPPED | OUTPATIENT
Start: 2024-10-29

## 2024-10-29 RX ORDER — POTASSIUM CHLORIDE 750 MG/1
10 TABLET, EXTENDED RELEASE ORAL 2 TIMES DAILY
Qty: 180 TABLET | Refills: 1 | Status: SHIPPED | OUTPATIENT
Start: 2024-10-29

## 2024-10-29 RX ORDER — FUROSEMIDE 20 MG/1
20 TABLET ORAL EVERY MORNING
Qty: 90 TABLET | Refills: 1 | Status: SHIPPED | OUTPATIENT
Start: 2024-10-29

## 2024-12-18 RX ORDER — HYDROXYZINE HYDROCHLORIDE 25 MG/1
25 TABLET, FILM COATED ORAL 2 TIMES DAILY PRN
Qty: 180 TABLET | Refills: 2 | Status: SHIPPED | OUTPATIENT
Start: 2024-12-18

## 2024-12-18 RX ORDER — TRAZODONE HYDROCHLORIDE 50 MG/1
50 TABLET, FILM COATED ORAL NIGHTLY
Qty: 90 TABLET | Refills: 2 | Status: SHIPPED | OUTPATIENT
Start: 2024-12-18

## 2024-12-18 RX ORDER — OMEPRAZOLE 40 MG/1
40 CAPSULE, DELAYED RELEASE ORAL
Qty: 90 CAPSULE | Refills: 2 | Status: SHIPPED | OUTPATIENT
Start: 2024-12-18

## 2024-12-18 RX ORDER — LEVOTHYROXINE SODIUM 50 UG/1
50 TABLET ORAL
Qty: 90 TABLET | Refills: 2 | Status: SHIPPED | OUTPATIENT
Start: 2024-12-18

## 2025-01-13 PROBLEM — E66.01 SEVERE OBESITY (BMI 35.0-39.9) WITH COMORBIDITY: Status: RESOLVED | Noted: 2020-07-16 | Resolved: 2025-01-13

## 2025-01-13 PROBLEM — R73.9 HYPERGLYCEMIA: Status: RESOLVED | Noted: 2024-09-11 | Resolved: 2025-01-13

## 2025-01-13 PROBLEM — L29.9 CHRONIC PRURITUS: Status: RESOLVED | Noted: 2022-05-13 | Resolved: 2025-01-13

## 2025-01-13 PROBLEM — R06.02 SHORTNESS OF BREATH: Status: RESOLVED | Noted: 2023-11-16 | Resolved: 2025-01-13

## 2025-03-03 RX ORDER — ALBUTEROL SULFATE 90 UG/1
INHALANT RESPIRATORY (INHALATION)
Qty: 3 EACH | Refills: 3 | Status: SHIPPED | OUTPATIENT
Start: 2025-03-03

## 2025-03-31 RX ORDER — LOSARTAN POTASSIUM 25 MG/1
25 TABLET ORAL DAILY
Qty: 90 TABLET | Refills: 1 | Status: SHIPPED | OUTPATIENT
Start: 2025-03-31

## 2025-03-31 RX ORDER — POTASSIUM CHLORIDE 750 MG/1
10 TABLET, EXTENDED RELEASE ORAL 2 TIMES DAILY
Qty: 180 TABLET | Refills: 1 | Status: SHIPPED | OUTPATIENT
Start: 2025-03-31

## 2025-04-02 RX ORDER — FUROSEMIDE 20 MG/1
20 TABLET ORAL EVERY MORNING
Qty: 90 TABLET | Refills: 1 | Status: SHIPPED | OUTPATIENT
Start: 2025-04-02

## 2025-05-09 ENCOUNTER — TELEPHONE (OUTPATIENT)
Facility: CLINIC | Age: 75
End: 2025-05-09

## 2025-05-09 SDOH — HEALTH STABILITY: PHYSICAL HEALTH: ON AVERAGE, HOW MANY DAYS PER WEEK DO YOU ENGAGE IN MODERATE TO STRENUOUS EXERCISE (LIKE A BRISK WALK)?: 0 DAYS

## 2025-05-09 ASSESSMENT — LIFESTYLE VARIABLES
HOW OFTEN DO YOU HAVE A DRINK CONTAINING ALCOHOL: 1
HOW MANY STANDARD DRINKS CONTAINING ALCOHOL DO YOU HAVE ON A TYPICAL DAY: PATIENT DOES NOT DRINK
HOW OFTEN DO YOU HAVE SIX OR MORE DRINKS ON ONE OCCASION: 1
HOW OFTEN DO YOU HAVE A DRINK CONTAINING ALCOHOL: NEVER
HOW MANY STANDARD DRINKS CONTAINING ALCOHOL DO YOU HAVE ON A TYPICAL DAY: 0

## 2025-05-09 ASSESSMENT — PATIENT HEALTH QUESTIONNAIRE - PHQ9
2. FEELING DOWN, DEPRESSED OR HOPELESS: NOT AT ALL
SUM OF ALL RESPONSES TO PHQ QUESTIONS 1-9: 0
1. LITTLE INTEREST OR PLEASURE IN DOING THINGS: NOT AT ALL
SUM OF ALL RESPONSES TO PHQ QUESTIONS 1-9: 0

## 2025-05-09 NOTE — TELEPHONE ENCOUNTER
Attempted to contact patient regarding upcoming Medicare wellness appointment and completion of HRA questionnaire. LVM for patient to please return call at [phone].

## 2025-05-12 PROBLEM — M79.89 PAIN AND SWELLING OF RIGHT LOWER LEG: Status: RESOLVED | Noted: 2024-09-11 | Resolved: 2025-05-12

## 2025-05-12 PROBLEM — E78.00 ELEVATED LDL CHOLESTEROL LEVEL: Status: ACTIVE | Noted: 2020-07-16

## 2025-05-12 PROBLEM — M79.661 PAIN AND SWELLING OF RIGHT LOWER LEG: Status: RESOLVED | Noted: 2024-09-11 | Resolved: 2025-05-12

## 2025-05-12 PROBLEM — M79.89 PAIN AND SWELLING OF LEFT LOWER LEG: Status: RESOLVED | Noted: 2024-09-11 | Resolved: 2025-05-12

## 2025-05-12 PROBLEM — M79.662 PAIN AND SWELLING OF LEFT LOWER LEG: Status: RESOLVED | Noted: 2024-09-11 | Resolved: 2025-05-12

## 2025-05-12 PROBLEM — E55.9 VITAMIN D DEFICIENCY: Status: RESOLVED | Noted: 2024-09-11 | Resolved: 2025-05-12

## 2025-05-12 SDOH — ECONOMIC STABILITY: FOOD INSECURITY: WITHIN THE PAST 12 MONTHS, YOU WORRIED THAT YOUR FOOD WOULD RUN OUT BEFORE YOU GOT MONEY TO BUY MORE.: PATIENT DECLINED

## 2025-05-12 SDOH — ECONOMIC STABILITY: INCOME INSECURITY: IN THE LAST 12 MONTHS, WAS THERE A TIME WHEN YOU WERE NOT ABLE TO PAY THE MORTGAGE OR RENT ON TIME?: NO

## 2025-05-12 SDOH — ECONOMIC STABILITY: TRANSPORTATION INSECURITY
IN THE PAST 12 MONTHS, HAS LACK OF TRANSPORTATION KEPT YOU FROM MEETINGS, WORK, OR FROM GETTING THINGS NEEDED FOR DAILY LIVING?: PATIENT DECLINED

## 2025-05-12 SDOH — ECONOMIC STABILITY: FOOD INSECURITY: WITHIN THE PAST 12 MONTHS, THE FOOD YOU BOUGHT JUST DIDN'T LAST AND YOU DIDN'T HAVE MONEY TO GET MORE.: PATIENT DECLINED

## 2025-05-12 SDOH — ECONOMIC STABILITY: TRANSPORTATION INSECURITY
IN THE PAST 12 MONTHS, HAS THE LACK OF TRANSPORTATION KEPT YOU FROM MEDICAL APPOINTMENTS OR FROM GETTING MEDICATIONS?: PATIENT DECLINED

## 2025-05-13 ENCOUNTER — OFFICE VISIT (OUTPATIENT)
Facility: CLINIC | Age: 75
End: 2025-05-13
Payer: MEDICARE

## 2025-05-13 VITALS
DIASTOLIC BLOOD PRESSURE: 80 MMHG | HEART RATE: 72 BPM | SYSTOLIC BLOOD PRESSURE: 130 MMHG | HEIGHT: 60 IN | BODY MASS INDEX: 44.84 KG/M2 | TEMPERATURE: 98 F | RESPIRATION RATE: 19 BRPM | OXYGEN SATURATION: 97 % | WEIGHT: 228.4 LBS

## 2025-05-13 DIAGNOSIS — F41.9 ANXIETY: ICD-10-CM

## 2025-05-13 DIAGNOSIS — I10 ESSENTIAL HYPERTENSION: ICD-10-CM

## 2025-05-13 DIAGNOSIS — Z53.20 MAMMOGRAM DECLINED: ICD-10-CM

## 2025-05-13 DIAGNOSIS — J43.2 CENTRILOBULAR EMPHYSEMA (HCC): ICD-10-CM

## 2025-05-13 DIAGNOSIS — E03.9 HYPOTHYROIDISM, UNSPECIFIED TYPE: ICD-10-CM

## 2025-05-13 DIAGNOSIS — E78.00 ELEVATED LDL CHOLESTEROL LEVEL: ICD-10-CM

## 2025-05-13 DIAGNOSIS — J44.9 CHRONIC OBSTRUCTIVE PULMONARY DISEASE, UNSPECIFIED COPD TYPE (HCC): ICD-10-CM

## 2025-05-13 DIAGNOSIS — F51.04 PSYCHOPHYSIOLOGICAL INSOMNIA: ICD-10-CM

## 2025-05-13 DIAGNOSIS — M79.7 FIBROMYALGIA: ICD-10-CM

## 2025-05-13 DIAGNOSIS — M05.9 RHEUMATOID ARTHRITIS WITH POSITIVE RHEUMATOID FACTOR, INVOLVING UNSPECIFIED SITE (HCC): ICD-10-CM

## 2025-05-13 PROCEDURE — 1036F TOBACCO NON-USER: CPT | Performed by: INTERNAL MEDICINE

## 2025-05-13 PROCEDURE — 99214 OFFICE O/P EST MOD 30 MIN: CPT | Performed by: INTERNAL MEDICINE

## 2025-05-13 PROCEDURE — G8400 PT W/DXA NO RESULTS DOC: HCPCS | Performed by: INTERNAL MEDICINE

## 2025-05-13 PROCEDURE — 1126F AMNT PAIN NOTED NONE PRSNT: CPT | Performed by: INTERNAL MEDICINE

## 2025-05-13 PROCEDURE — 1160F RVW MEDS BY RX/DR IN RCRD: CPT | Performed by: INTERNAL MEDICINE

## 2025-05-13 PROCEDURE — 1159F MED LIST DOCD IN RCRD: CPT | Performed by: INTERNAL MEDICINE

## 2025-05-13 PROCEDURE — G8417 CALC BMI ABV UP PARAM F/U: HCPCS | Performed by: INTERNAL MEDICINE

## 2025-05-13 PROCEDURE — 3075F SYST BP GE 130 - 139MM HG: CPT | Performed by: INTERNAL MEDICINE

## 2025-05-13 PROCEDURE — 1090F PRES/ABSN URINE INCON ASSESS: CPT | Performed by: INTERNAL MEDICINE

## 2025-05-13 PROCEDURE — G8427 DOCREV CUR MEDS BY ELIG CLIN: HCPCS | Performed by: INTERNAL MEDICINE

## 2025-05-13 PROCEDURE — 3017F COLORECTAL CA SCREEN DOC REV: CPT | Performed by: INTERNAL MEDICINE

## 2025-05-13 PROCEDURE — 1123F ACP DISCUSS/DSCN MKR DOCD: CPT | Performed by: INTERNAL MEDICINE

## 2025-05-13 PROCEDURE — 3079F DIAST BP 80-89 MM HG: CPT | Performed by: INTERNAL MEDICINE

## 2025-05-13 PROCEDURE — 3023F SPIROM DOC REV: CPT | Performed by: INTERNAL MEDICINE

## 2025-05-13 RX ORDER — TRIAMCINOLONE ACETONIDE 1 MG/G
CREAM TOPICAL
Qty: 240 G | Refills: 0 | Status: SHIPPED | OUTPATIENT
Start: 2025-05-13

## 2025-05-13 RX ORDER — DULOXETIN HYDROCHLORIDE 30 MG/1
30 CAPSULE, DELAYED RELEASE ORAL DAILY
COMMUNITY

## 2025-05-13 RX ORDER — PREGABALIN 200 MG/1
200 CAPSULE ORAL 2 TIMES DAILY
COMMUNITY

## 2025-05-13 ASSESSMENT — ENCOUNTER SYMPTOMS
ALLERGIC/IMMUNOLOGIC NEGATIVE: 1
GASTROINTESTINAL NEGATIVE: 1
RESPIRATORY NEGATIVE: 1

## 2025-05-13 NOTE — PROGRESS NOTES
Katy Jackman (: 1950) is a 74 y.o. female, Established patient patient, here for evaluation of the following chief complaint(s):  Medicare AWV and Follow-up Chronic Condition         ASSESSMENT/PLAN:  Below is the assessment and plan developed based on review of pertinent history, physical exam, labs, studies, and medications.      1. Essential hypertension  2. Chronic obstructive pulmonary disease, unspecified COPD type ([facility])  3. Hypothyroidism, unspecified type  4. Fibromyalgia  5. Psychophysiological insomnia  6. BMI 40.0-44.9, adult ([facility])  7. Centrilobular emphysema (HCC)  8. Rheumatoid arthritis with positive rheumatoid factor, involving unspecified site ([facility])  9. Anxiety  10. Elevated LDL cholesterol level  11. Mammogram declined    Hypertension, controlled, continue current dose of losartan 25 mg once a day.    COPD with history of ex smoking.  He is taking rescue and maintenance inhaler.  He does not like to take nebulizer machine.    Hypothyroidism continue current dose of levothyroxine    Fibromyalgia now he is getting pregabalin 200 mg twice a day and also she has been started on duloxetine 30 mg once a day by rheumatologist.    Rheumatoid arthritis with positive rheumatoid factor and under the care of rheumatologist    Insomnia with anxiety getting hydroxyzine and trazodone.    GERD getting omeprazole.    Rash with itching most likely allergic dermatitis started on triamcinolone cream refills given.    Rash around the right shoulder joint near the armpit clinically looks like allergic dermatitis started on triamcinolone cream.  She has a lot of itching.    She says she takes furosemide only as needed when she feels swelling of ankles.  She says she takes furosemide 20 mg only as needed    She says she has cut back starch and sugar in the diet and she is getting food from meals on the wheels.  She says that she is not drinking regular soda.  She says she has slight weight loss.    She has

## 2025-05-13 NOTE — PROGRESS NOTES
Chief Complaint   Patient presents with    Medicare AWV    Follow-up Chronic Condition         BP (!) 146/84   Pulse 72   Temp 98 °F (36.7 °C) (Oral)   Resp 19   Ht 1.524 m (5')   Wt 103.6 kg (228 lb 6.4 oz)   SpO2 97%   BMI 44.61 kg/m²         \"Have you been to the ER, urgent care clinic since your last visit?  Hospitalized since your last visit?\"    NO    “Have you seen or consulted any other health care providers outside our system since your last visit?”    Dr Mayur Nielsen      Have you had a mammogram?”   NO    Date of last Mammogram: 10/4/2013

## 2025-07-28 RX ORDER — TRAZODONE HYDROCHLORIDE 50 MG/1
50 TABLET ORAL NIGHTLY
Qty: 90 TABLET | Refills: 2 | Status: SHIPPED | OUTPATIENT
Start: 2025-07-28

## 2025-07-28 RX ORDER — OMEPRAZOLE 40 MG/1
40 CAPSULE, DELAYED RELEASE ORAL
Qty: 90 CAPSULE | Refills: 2 | Status: SHIPPED | OUTPATIENT
Start: 2025-07-28

## 2025-07-28 RX ORDER — LEVOTHYROXINE SODIUM 50 UG/1
50 TABLET ORAL
Qty: 90 TABLET | Refills: 2 | Status: SHIPPED | OUTPATIENT
Start: 2025-07-28

## 2025-07-28 RX ORDER — HYDROXYZINE HYDROCHLORIDE 25 MG/1
TABLET, FILM COATED ORAL
Qty: 180 TABLET | Refills: 2 | Status: SHIPPED | OUTPATIENT
Start: 2025-07-28

## 2025-08-25 RX ORDER — ALBUTEROL SULFATE 90 UG/1
INHALANT RESPIRATORY (INHALATION)
Qty: 3 EACH | Refills: 3 | Status: SHIPPED | OUTPATIENT
Start: 2025-08-25

## 2025-08-25 RX ORDER — LOSARTAN POTASSIUM 25 MG/1
25 TABLET ORAL DAILY
Qty: 90 TABLET | Refills: 2 | Status: SHIPPED | OUTPATIENT
Start: 2025-08-25